# Patient Record
Sex: MALE | Race: WHITE | NOT HISPANIC OR LATINO | Employment: OTHER | URBAN - METROPOLITAN AREA
[De-identification: names, ages, dates, MRNs, and addresses within clinical notes are randomized per-mention and may not be internally consistent; named-entity substitution may affect disease eponyms.]

---

## 2019-10-24 ENCOUNTER — HOSPITAL ENCOUNTER (INPATIENT)
Facility: HOSPITAL | Age: 59
LOS: 1 days | Discharge: HOME | End: 2019-10-25
Attending: EMERGENCY MEDICINE | Admitting: INTERNAL MEDICINE

## 2019-10-24 ENCOUNTER — APPOINTMENT (OUTPATIENT)
Dept: CARDIOLOGY | Facility: HOSPITAL | Age: 59
End: 2019-10-24

## 2019-10-24 ENCOUNTER — APPOINTMENT (OUTPATIENT)
Dept: GENERAL RADIOLOGY | Facility: HOSPITAL | Age: 59
End: 2019-10-24

## 2019-10-24 DIAGNOSIS — I21.4 ACUTE NON-ST ELEVATION MYOCARDIAL INFARCTION (NSTEMI) (HCC): Primary | ICD-10-CM

## 2019-10-24 DIAGNOSIS — Z91.199 MEDICALLY NONCOMPLIANT: ICD-10-CM

## 2019-10-24 DIAGNOSIS — I21.4 NSTEMI (NON-ST ELEVATED MYOCARDIAL INFARCTION) (HCC): ICD-10-CM

## 2019-10-24 LAB
ALBUMIN SERPL-MCNC: 4.3 G/DL (ref 3.5–5.2)
ALBUMIN/GLOB SERPL: 1.2 G/DL
ALP SERPL-CCNC: 96 U/L (ref 39–117)
ALT SERPL W P-5'-P-CCNC: 39 U/L (ref 1–41)
ANION GAP SERPL CALCULATED.3IONS-SCNC: 14.1 MMOL/L (ref 5–15)
AST SERPL-CCNC: 78 U/L (ref 1–40)
BASOPHILS # BLD AUTO: 0.02 10*3/MM3 (ref 0–0.2)
BASOPHILS NFR BLD AUTO: 0.2 % (ref 0–1.5)
BILIRUB SERPL-MCNC: 1.4 MG/DL (ref 0.2–1.2)
BUN BLD-MCNC: 10 MG/DL (ref 6–20)
BUN/CREAT SERPL: 9.9 (ref 7–25)
CALCIUM SPEC-SCNC: 9.1 MG/DL (ref 8.6–10.5)
CHLORIDE SERPL-SCNC: 97 MMOL/L (ref 98–107)
CO2 SERPL-SCNC: 24.9 MMOL/L (ref 22–29)
CREAT BLD-MCNC: 1.01 MG/DL (ref 0.76–1.27)
D DIMER PPP FEU-MCNC: 0.35 MCGFEU/ML (ref 0–0.49)
DEPRECATED RDW RBC AUTO: 42.5 FL (ref 37–54)
EOSINOPHIL # BLD AUTO: 0.02 10*3/MM3 (ref 0–0.4)
EOSINOPHIL NFR BLD AUTO: 0.2 % (ref 0.3–6.2)
ERYTHROCYTE [DISTWIDTH] IN BLOOD BY AUTOMATED COUNT: 13 % (ref 12.3–15.4)
GFR SERPL CREATININE-BSD FRML MDRD: 76 ML/MIN/1.73
GLOBULIN UR ELPH-MCNC: 3.5 GM/DL
GLUCOSE BLD-MCNC: 213 MG/DL (ref 65–99)
HCT VFR BLD AUTO: 50.2 % (ref 37.5–51)
HGB BLD-MCNC: 17.2 G/DL (ref 13–17.7)
HOLD SPECIMEN: NORMAL
HOLD SPECIMEN: NORMAL
IMM GRANULOCYTES # BLD AUTO: 0.04 10*3/MM3 (ref 0–0.05)
IMM GRANULOCYTES NFR BLD AUTO: 0.5 % (ref 0–0.5)
LIPASE SERPL-CCNC: 18 U/L (ref 13–60)
LYMPHOCYTES # BLD AUTO: 1.28 10*3/MM3 (ref 0.7–3.1)
LYMPHOCYTES NFR BLD AUTO: 15.4 % (ref 19.6–45.3)
MCH RBC QN AUTO: 30.3 PG (ref 26.6–33)
MCHC RBC AUTO-ENTMCNC: 34.3 G/DL (ref 31.5–35.7)
MCV RBC AUTO: 88.5 FL (ref 79–97)
MONOCYTES # BLD AUTO: 1.01 10*3/MM3 (ref 0.1–0.9)
MONOCYTES NFR BLD AUTO: 12.1 % (ref 5–12)
NEUTROPHILS # BLD AUTO: 5.96 10*3/MM3 (ref 1.7–7)
NEUTROPHILS NFR BLD AUTO: 71.6 % (ref 42.7–76)
NRBC BLD AUTO-RTO: 0 /100 WBC (ref 0–0.2)
PLATELET # BLD AUTO: 164 10*3/MM3 (ref 140–450)
PMV BLD AUTO: 10.2 FL (ref 6–12)
POTASSIUM BLD-SCNC: 3.9 MMOL/L (ref 3.5–5.2)
PROT SERPL-MCNC: 7.8 G/DL (ref 6–8.5)
RBC # BLD AUTO: 5.67 10*6/MM3 (ref 4.14–5.8)
SODIUM BLD-SCNC: 136 MMOL/L (ref 136–145)
TROPONIN T SERPL-MCNC: 1.29 NG/ML (ref 0–0.03)
WBC NRBC COR # BLD: 8.33 10*3/MM3 (ref 3.4–10.8)
WHOLE BLOOD HOLD SPECIMEN: NORMAL
WHOLE BLOOD HOLD SPECIMEN: NORMAL

## 2019-10-24 PROCEDURE — 93306 TTE W/DOPPLER COMPLETE: CPT

## 2019-10-24 PROCEDURE — 99152 MOD SED SAME PHYS/QHP 5/>YRS: CPT | Performed by: INTERNAL MEDICINE

## 2019-10-24 PROCEDURE — 25010000002 PERFLUTREN (DEFINITY) 8.476 MG IN SODIUM CHLORIDE 0.9 % 10 ML INJECTION: Performed by: INTERNAL MEDICINE

## 2019-10-24 PROCEDURE — 0 IOPAMIDOL PER 1 ML: Performed by: INTERNAL MEDICINE

## 2019-10-24 PROCEDURE — C1769 GUIDE WIRE: HCPCS | Performed by: INTERNAL MEDICINE

## 2019-10-24 PROCEDURE — 93010 ELECTROCARDIOGRAM REPORT: CPT | Performed by: INTERNAL MEDICINE

## 2019-10-24 PROCEDURE — 93458 L HRT ARTERY/VENTRICLE ANGIO: CPT | Performed by: INTERNAL MEDICINE

## 2019-10-24 PROCEDURE — 4A023N7 MEASUREMENT OF CARDIAC SAMPLING AND PRESSURE, LEFT HEART, PERCUTANEOUS APPROACH: ICD-10-PCS | Performed by: INTERNAL MEDICINE

## 2019-10-24 PROCEDURE — 99284 EMERGENCY DEPT VISIT MOD MDM: CPT

## 2019-10-24 PROCEDURE — 93005 ELECTROCARDIOGRAM TRACING: CPT

## 2019-10-24 PROCEDURE — 25010000002 MIDAZOLAM PER 1 MG: Performed by: INTERNAL MEDICINE

## 2019-10-24 PROCEDURE — 84484 ASSAY OF TROPONIN QUANT: CPT | Performed by: EMERGENCY MEDICINE

## 2019-10-24 PROCEDURE — 71046 X-RAY EXAM CHEST 2 VIEWS: CPT

## 2019-10-24 PROCEDURE — G0378 HOSPITAL OBSERVATION PER HR: HCPCS

## 2019-10-24 PROCEDURE — B2151ZZ FLUOROSCOPY OF LEFT HEART USING LOW OSMOLAR CONTRAST: ICD-10-PCS | Performed by: INTERNAL MEDICINE

## 2019-10-24 PROCEDURE — 80053 COMPREHEN METABOLIC PANEL: CPT | Performed by: EMERGENCY MEDICINE

## 2019-10-24 PROCEDURE — 83690 ASSAY OF LIPASE: CPT | Performed by: EMERGENCY MEDICINE

## 2019-10-24 PROCEDURE — 99153 MOD SED SAME PHYS/QHP EA: CPT | Performed by: INTERNAL MEDICINE

## 2019-10-24 PROCEDURE — 25010000002 HEPARIN (PORCINE) PER 1000 UNITS: Performed by: INTERNAL MEDICINE

## 2019-10-24 PROCEDURE — 93306 TTE W/DOPPLER COMPLETE: CPT | Performed by: INTERNAL MEDICINE

## 2019-10-24 PROCEDURE — B2111ZZ FLUOROSCOPY OF MULTIPLE CORONARY ARTERIES USING LOW OSMOLAR CONTRAST: ICD-10-PCS | Performed by: INTERNAL MEDICINE

## 2019-10-24 PROCEDURE — 85025 COMPLETE CBC W/AUTO DIFF WBC: CPT | Performed by: EMERGENCY MEDICINE

## 2019-10-24 PROCEDURE — 93005 ELECTROCARDIOGRAM TRACING: CPT | Performed by: EMERGENCY MEDICINE

## 2019-10-24 PROCEDURE — 99223 1ST HOSP IP/OBS HIGH 75: CPT | Performed by: INTERNAL MEDICINE

## 2019-10-24 PROCEDURE — C1894 INTRO/SHEATH, NON-LASER: HCPCS | Performed by: INTERNAL MEDICINE

## 2019-10-24 PROCEDURE — 85379 FIBRIN DEGRADATION QUANT: CPT | Performed by: EMERGENCY MEDICINE

## 2019-10-24 PROCEDURE — 25010000002 FENTANYL CITRATE (PF) 100 MCG/2ML SOLUTION: Performed by: INTERNAL MEDICINE

## 2019-10-24 RX ORDER — ACETAMINOPHEN 325 MG/1
650 TABLET ORAL EVERY 4 HOURS PRN
Status: DISCONTINUED | OUTPATIENT
Start: 2019-10-24 | End: 2019-10-25 | Stop reason: HOSPADM

## 2019-10-24 RX ORDER — ASPIRIN 81 MG/1
324 TABLET, CHEWABLE ORAL ONCE
Status: COMPLETED | OUTPATIENT
Start: 2019-10-24 | End: 2019-10-24

## 2019-10-24 RX ORDER — MIDAZOLAM HYDROCHLORIDE 1 MG/ML
INJECTION INTRAMUSCULAR; INTRAVENOUS AS NEEDED
Status: DISCONTINUED | OUTPATIENT
Start: 2019-10-24 | End: 2019-10-24 | Stop reason: HOSPADM

## 2019-10-24 RX ORDER — SODIUM CHLORIDE 0.9 % (FLUSH) 0.9 %
10 SYRINGE (ML) INJECTION AS NEEDED
Status: DISCONTINUED | OUTPATIENT
Start: 2019-10-24 | End: 2019-10-25 | Stop reason: HOSPADM

## 2019-10-24 RX ORDER — LISINOPRIL 5 MG/1
5 TABLET ORAL DAILY
Status: DISCONTINUED | OUTPATIENT
Start: 2019-10-24 | End: 2019-10-25 | Stop reason: HOSPADM

## 2019-10-24 RX ORDER — ASPIRIN 81 MG/1
81 TABLET ORAL DAILY
Status: DISCONTINUED | OUTPATIENT
Start: 2019-10-24 | End: 2019-10-25 | Stop reason: HOSPADM

## 2019-10-24 RX ORDER — CLOPIDOGREL BISULFATE 75 MG/1
75 TABLET ORAL DAILY
Status: DISCONTINUED | OUTPATIENT
Start: 2019-10-25 | End: 2019-10-25 | Stop reason: HOSPADM

## 2019-10-24 RX ORDER — LIDOCAINE HYDROCHLORIDE 20 MG/ML
INJECTION, SOLUTION INFILTRATION; PERINEURAL AS NEEDED
Status: DISCONTINUED | OUTPATIENT
Start: 2019-10-24 | End: 2019-10-24 | Stop reason: HOSPADM

## 2019-10-24 RX ORDER — FENTANYL CITRATE 50 UG/ML
INJECTION, SOLUTION INTRAMUSCULAR; INTRAVENOUS AS NEEDED
Status: DISCONTINUED | OUTPATIENT
Start: 2019-10-24 | End: 2019-10-24 | Stop reason: HOSPADM

## 2019-10-24 RX ORDER — ATORVASTATIN CALCIUM 20 MG/1
40 TABLET, FILM COATED ORAL NIGHTLY
Status: DISCONTINUED | OUTPATIENT
Start: 2019-10-24 | End: 2019-10-25 | Stop reason: HOSPADM

## 2019-10-24 RX ORDER — SODIUM CHLORIDE 9 MG/ML
INJECTION, SOLUTION INTRAVENOUS CONTINUOUS PRN
Status: COMPLETED | OUTPATIENT
Start: 2019-10-24 | End: 2019-10-24

## 2019-10-24 RX ORDER — NITROGLYCERIN 0.4 MG/1
0.4 TABLET SUBLINGUAL
Status: DISCONTINUED | OUTPATIENT
Start: 2019-10-24 | End: 2019-10-25 | Stop reason: HOSPADM

## 2019-10-24 RX ADMIN — NITROGLYCERIN 0.4 MG: 0.4 TABLET, ORALLY DISINTEGRATING SUBLINGUAL at 11:38

## 2019-10-24 RX ADMIN — NITROGLYCERIN 1 INCH: 20 OINTMENT TOPICAL at 11:53

## 2019-10-24 RX ADMIN — NITROGLYCERIN 0.4 MG: 0.4 TABLET, ORALLY DISINTEGRATING SUBLINGUAL at 11:32

## 2019-10-24 RX ADMIN — ATORVASTATIN CALCIUM 40 MG: 20 TABLET, FILM COATED ORAL at 20:59

## 2019-10-24 RX ADMIN — LISINOPRIL 5 MG: 5 TABLET ORAL at 21:00

## 2019-10-24 RX ADMIN — ASPIRIN 324 MG: 81 TABLET, CHEWABLE ORAL at 11:31

## 2019-10-24 RX ADMIN — METOPROLOL TARTRATE 25 MG: 25 TABLET ORAL at 17:03

## 2019-10-24 RX ADMIN — ASPIRIN 81 MG: 81 TABLET, COATED ORAL at 17:03

## 2019-10-24 RX ADMIN — PERFLUTREN 2 ML: 6.52 INJECTION, SUSPENSION INTRAVENOUS at 18:30

## 2019-10-24 NOTE — ED PROVIDER NOTES
" EMERGENCY DEPARTMENT ENCOUNTER    CHIEF COMPLAINT  Chief Complaint: Chest pain  History given by: pt  History limited by: Pt is a poor historian  Room Number: 2205/1  PMD: Willard De Leon MD      HPI:  Pt is a 59 y.o. male who presents complaining of indescribable nonexertional constant moderate central chest pain, sometimes radiating into the middle back, that started two days ago while the pt was eating dinner and is unchanged by a deep breath. Pt denies any SOA, nausea, or vomiting. Pt states his pain has gotten progressively better and now states he has \"just a little bit\" of pain. Pt admits to being a social drinker and states he has a family history of cardiac disease but denies hx of cardiac issues or smoking. Pt states he is supposed to be taking diabetic and cholesterol medications but states he is not compliant.     Duration:  Two days  Onset: gradial  Timing: constant  Location: central chest  Radiation: into the middle of the back  Quality: indescribable  Intensity/Severity: moderate  Progression: progressively better  Associated Symptoms: none  Previous Episodes: none    PAST MEDICAL HISTORY  Active Ambulatory Problems     Diagnosis Date Noted   • No Active Ambulatory Problems     Resolved Ambulatory Problems     Diagnosis Date Noted   • No Resolved Ambulatory Problems     Past Medical History:   Diagnosis Date   • Coronary artery disease    • Diabetes mellitus (CMS/HCC)    • Hyperlipidemia        PAST SURGICAL HISTORY  Past Surgical History:   Procedure Laterality Date   • COLONOSCOPY N/A 12/15/2016    Procedure: COLONOSCOPY TO CECUM AND TI WITH POLYPECTOMY COLD SNARE;  Surgeon: Chris Means MD;  Location: [facility] ENDOSCOPY;  Service:    • FRACTURE SURGERY         FAMILY HISTORY  History reviewed. No pertinent family history.    SOCIAL HISTORY  Social History     Socioeconomic History   • Marital status:      Spouse name: Not on file   • Number of children: Not on file   • Years of education: " Not on file   • Highest education level: Not on file   Tobacco Use   • Smoking status: Never Smoker   Substance and Sexual Activity   • Alcohol use: Yes     Comment: socially    • Drug use: No   • Sexual activity: Defer       ALLERGIES  Patient has no known allergies.    REVIEW OF SYSTEMS  Review of Systems   Reason unable to perform ROS: pt is a poor historian.   Respiratory: Negative for shortness of breath.    Cardiovascular: Positive for chest pain.   Gastrointestinal: Negative for nausea and vomiting.   Musculoskeletal: Positive for back pain (middle; due to radiation of chest pain).       PHYSICAL EXAM  ED Triage Vitals [10/24/19 1007]   Temp Heart Rate Resp BP SpO2   96.8 °F (36 °C) 105 16 (!) 158/103 95 %      Temp src Heart Rate Source Patient Position BP Location FiO2 (%)   Tympanic -- -- -- --       Physical Exam   Constitutional: He is oriented to person, place, and time. No distress.   HENT:   Head: Normocephalic and atraumatic.   Eyes: EOM are normal. Pupils are equal, round, and reactive to light.   Neck: Normal range of motion. Neck supple.   Cardiovascular: Normal rate, regular rhythm, normal heart sounds and intact distal pulses.   Pulmonary/Chest: Effort normal and breath sounds normal. No respiratory distress. He has no wheezes. He has no rales. He exhibits no tenderness.   Pain is not reproducible   Abdominal: Soft. There is no tenderness. There is no rebound and no guarding.   Musculoskeletal: Normal range of motion. He exhibits no edema (LE).   Neurological: He is alert and oriented to person, place, and time. He has normal sensation and normal strength.   Skin: Skin is warm and dry.   Psychiatric: Mood and affect normal.   Nursing note and vitals reviewed.      LAB RESULTS  Lab Results (last 24 hours)     Procedure Component Value Units Date/Time    CBC & Differential [[id number]] Collected:  10/24/19 1040    Specimen:  Blood Updated:  10/24/19 1136    Narrative:       The following orders  were created for panel order CBC & Differential.  Procedure                               Abnormality         Status                     ---------                               -----------         ------                     CBC Auto Differential[[id number]]        Abnormal            Final result                 Please view results for these tests on the individual orders.    Comprehensive Metabolic Panel [[id number]]  (Abnormal) Collected:  10/24/19 1040    Specimen:  Blood Updated:  10/24/19 1137     Glucose 213 mg/dL      BUN 10 mg/dL      Creatinine 1.01 mg/dL      Sodium 136 mmol/L      Potassium 3.9 mmol/L      Chloride 97 mmol/L      CO2 24.9 mmol/L      Calcium 9.1 mg/dL      Total Protein 7.8 g/dL      Albumin 4.30 g/dL      ALT (SGPT) 39 U/L      AST (SGOT) 78 U/L      Alkaline Phosphatase 96 U/L      Total Bilirubin 1.4 mg/dL      eGFR Non African Amer 76 mL/min/1.73      Globulin 3.5 gm/dL      A/G Ratio 1.2 g/dL      BUN/Creatinine Ratio 9.9     Anion Gap 14.1 mmol/L     Narrative:       GFR Normal >60  Chronic Kidney Disease <60  Kidney Failure <15    Lipase [[id number]]  (Normal) Collected:  10/24/19 1040    Specimen:  Blood Updated:  10/24/19 1137     Lipase 18 U/L     Troponin [[id number]]  (Abnormal) Collected:  10/24/19 1040    Specimen:  Blood Updated:  10/24/19 1135     Troponin T 1.290 ng/mL     Narrative:       Troponin T Reference Range:  <= 0.03 ng/mL-   Negative for AMI  >0.03 ng/mL-     Abnormal for myocardial necrosis.  Clinicians would have to utilize clinical acumen, EKG, Troponin and serial changes to determine if it is an Acute Myocardial Infarction or myocardial injury due to an underlying chronic condition.     D-dimer, Quantitative [[id number]]  (Normal) Collected:  10/24/19 1040    Specimen:  Blood Updated:  10/24/19 1144     D-Dimer, Quantitative 0.35 MCGFEU/mL     Narrative:       The Stago D-Dimer test used in conjunction with a clinical pretest probability (PTP) assessment model,  has been approved by the FDA to rule out the presence of venous thromboembolism (VTE) in outpatients suspected of deep venous thrombosis (DVT) or pulmonary embolism (PE). The cut-off for negative predictive value is <0.50 MCGFEU/mL.    CBC Auto Differential [[id number]]  (Abnormal) Collected:  10/24/19 1040    Specimen:  Blood Updated:  10/24/19 1136     WBC 8.33 10*3/mm3      RBC 5.67 10*6/mm3      Hemoglobin 17.2 g/dL      Hematocrit 50.2 %      MCV 88.5 fL      MCH 30.3 pg      MCHC 34.3 g/dL      RDW 13.0 %      RDW-SD 42.5 fl      MPV 10.2 fL      Platelets 164 10*3/mm3      Neutrophil % 71.6 %      Lymphocyte % 15.4 %      Monocyte % 12.1 %      Eosinophil % 0.2 %      Basophil % 0.2 %      Immature Grans % 0.5 %      Neutrophils, Absolute 5.96 10*3/mm3      Lymphocytes, Absolute 1.28 10*3/mm3      Monocytes, Absolute 1.01 10*3/mm3      Eosinophils, Absolute 0.02 10*3/mm3      Basophils, Absolute 0.02 10*3/mm3      Immature Grans, Absolute 0.04 10*3/mm3      nRBC 0.0 /100 WBC           I ordered the above labs and reviewed the results    RADIOLOGY  XR Chest 2 View   HISTORY: Chest pain.     FINDINGS:  2 views of the chest demonstrate the heart to be within  normal limits in size. There is no evidence of infiltrate, effusion or  of congestive failure.           I ordered the above noted radiological studies. Interpreted by radiologist.       PROCEDURES  Critical Care  Performed by: Ayden Ervin MD  Authorized by: Ayden Ervin MD     Critical care provider statement:     Critical care time (minutes):  30    Critical care time was exclusive of:  Separately billable procedures and treating other patients    Critical care was necessary to treat or prevent imminent or life-threatening deterioration of the following conditions:  Cardiac failure    Critical care was time spent personally by me on the following activities:  Ordering and performing treatments and interventions, ordering and review of laboratory  studies, ordering and review of radiographic studies, pulse oximetry, development of treatment plan with patient or surrogate, discussions with consultants, examination of patient, obtaining history from patient or surrogate, review of old charts and re-evaluation of patient's condition            EKG          EKG time: 1017  Rhythm/Rate: 90 sinus rhythm  P waves and MO: normal  QRS, axis: IVCD, normal axis,Q waves in leads I, aVL, V5, and V6  ST and T waves: nonspecific ST and T wave changes in multiple leads, subtle ST elevation in aVL    Interpreted Contemporaneously by me, independently viewed  No prior for comparison    REPEAT EKG          EKG time: 1144  Rhythm/Rate: 95 normal lsinus  P waves and MO: normal  QRS, axis: IVCD, Q waves in leads I, aVL, V5, and V6  ST and T waves: mild J point elevation in I and aVL less than a mm, nonspecific ST and T waves changes in multiple leads but most noticeable in inferior leads    Interpreted Contemporaneously by me, independently viewed  Similar compared to prior today        PROGRESS AND CONSULTS       Medications   sodium chloride 0.9 % flush 10 mL ( Intravenous MAR Unhold 10/24/19 1345)   nitroglycerin (NITROSTAT) SL tablet 0.4 mg ( Sublingual MAR Unhold 10/24/19 1345)   acetaminophen (TYLENOL) tablet 650 mg (not administered)   lisinopril (PRINIVIL,ZESTRIL) tablet 5 mg (not administered)   metoprolol tartrate (LOPRESSOR) tablet 25 mg (not administered)   atorvastatin (LIPITOR) tablet 40 mg (not administered)   aspirin EC tablet 81 mg (not administered)   clopidogrel (PLAVIX) tablet 75 mg (not administered)   aspirin chewable tablet 324 mg (324 mg Oral Given 10/24/19 1131)   nitroglycerin (NITROSTAT) ointment 1 inch (1 inch Topical Given 10/24/19 1153)   sodium chloride 0.9 % infusion (400 mL Intravenous New Bag 10/24/19 1314)       1115  Discussed plan to do labs and chest XR. Pt understands and agrees with the plan. All questions have been  answered.    1138  Elevated troponin noted. Ordered repeat EKG, ASA, and NTG and interventional cardiology consult.     1145  Rechecked patient who is resting comfortably and states he now has no pain. BP - 150/96  Discussed all lab and test results, specifically that the pt has suffered an MI. Discussed plan to consult interventional cardiology and do a chest XR. Pt understands and agrees with the plan. All questions have been answered.    1152  Discussed case with Dr Bagley, Interventional Cardiology  Reviewed history, exam, results and treatments.  Discussed concerns and plan of care. Dr Bagley accepts pt to be taken to the Cath lab and recommends not giving heparin. States they will give heparin in the cath lab.        MEDICAL DECISION MAKING  Results were reviewed/discussed with the patient and they were also made aware of online access. Pt also made aware that some labs, such as cultures, will not be resulted during ER visit and follow up with PMD is necessary.     MDM  Number of Diagnoses or Management Options  Acute non-ST elevation myocardial infarction (NSTEMI) (CMS/HCC):   Medically noncompliant:   NSTEMI (non-ST elevated myocardial infarction) (CMS/HCC):      Amount and/or Complexity of Data Reviewed  Clinical lab tests: ordered and reviewed (Troponin 1.290)  Tests in the radiology section of CPT®: ordered and reviewed (Chest XR - NAD)  Tests in the medicine section of CPT®: ordered and reviewed (Refer to the procedure section of the note for EKG results)  Decide to obtain previous medical records or to obtain history from someone other than the patient: yes (Epic)  Review and summarize past medical records: yes (No old records here)  Discuss the patient with other providers: yes (Dr Bagley)    Critical Care  Total time providing critical care: 30-74 minutes         DIAGNOSIS  Final diagnoses:   Acute non-ST elevation myocardial infarction (NSTEMI) (CMS/HCC)   NSTEMI (non-ST elevated myocardial infarction)  (CMS/[facility])   Medically noncompliant       DISPOSITION  Send to Cath Lab.     Latest Documented Vital Signs:  As of 2:23 PM  BP- 120/89 HR- 94 Temp- 98 °F (36.7 °C) (Oral) O2 sat- 96%    --  Documentation assistance provided by annamarie Hutchinson for Dr Ervin.  Information recorded by the scribe was done at my direction and has been verified and validated by me.         Melody Hutchinson  10/24/19 5897       Ayden Ervin MD  10/24/19 8461

## 2019-10-24 NOTE — H&P
Patient Name: Zane Joshi  :1960  59 y.o.    Date of Admission: 10/24/2019  Date of Consultation:  10/24/19  Encounter Provider: Carmelina Elena RN  Place of Service: [facility]  Referring Provider: No ref. provider found  Patient Care Team:  Willard De Leon MD as PCP - General (Internal Medicine)      Chief complaint: Acute NSTEMI     History of Present Illness:    Mr Joshi is a 59 year old patient with a history of diabetes and hyperlipidemia who presented to the ED with complaints of constant chest pain starting in the center of his chest and radiating into his back.  He reports the pain started a couple days ago during dinner.  He denies any nausea or diaphoresis.  He has a family history of premature heart disease.  He denies smoking history.  He is not compliant with his diabetic and cholesterol medications.      On arrival his troponin was 1.290, EKG showed some ST changes in the lateral leads. EKG was repeated with some inferior changes as well. He was given ASA and NTG Sl and NTG paste with resolution of chest pain.  Due to his elevated troponin and EKG changes he was taken urgently to the cath lab       Past Medical History:   Diagnosis Date   • Diabetes mellitus (CMS/HCC)     borderline    • Hyperlipidemia        Past Surgical History:   Procedure Laterality Date   • COLONOSCOPY N/A 12/15/2016    Procedure: COLONOSCOPY TO CECUM AND TI WITH POLYPECTOMY COLD SNARE;  Surgeon: Chris Means MD;  Location: [facility] ENDOSCOPY;  Service:    • FRACTURE SURGERY           Prior to Admission medications    Medication Sig Start Date End Date Taking? Authorizing Provider   atorvastatin (LIPITOR) 10 MG tablet Take 10 mg by mouth Daily.    ProviderKsenia MD   metFORMIN (GLUCOPHAGE) 500 MG tablet Take 500 mg by mouth 2 (Two) Times a Day With Meals.    ProviderKsenia MD       No Known Allergies    Social History     Socioeconomic History    • Marital status:      Spouse name: Not on file   • Number of children: Not on file   • Years of education: Not on file   • Highest education level: Not on file   Tobacco Use   • Smoking status: Never Smoker   Substance and Sexual Activity   • Alcohol use: Yes     Comment: socially    • Drug use: No   • Sexual activity: Defer       History reviewed. No pertinent family history.    REVIEW OF SYSTEMS:   All systems reviewed.  Pertinent positives identified in HPI.  All other systems are negative.      Objective:     Vitals:    10/24/19 1055 10/24/19 1133 10/24/19 1225 10/24/19 1230   BP: (!) 147/103 150/96 137/88    Pulse: 86 96 88 94   Resp:       Temp:       TempSrc:       SpO2: 98% 97% 95% 96%   Weight:       Height:         Body mass index is 31.62 kg/m².    General Appearance:    Alert, cooperative, in no acute distress   Head:    Normocephalic, without obvious abnormality, atraumatic   Eyes:            Lids and lashes normal, conjunctivae and sclerae normal, no icterus, no pallor, corneas clear, PERRLA   Ears:    Ears appear intact with no abnormalities noted   Throat:   No oral lesions, no thrush, oral mucosa moist   Neck:   No adenopathy, supple, trachea midline, no thyromegaly, no carotid bruit, no JVD   Back:     No kyphosis present, no scoliosis present, no skin lesions, erythema or scars, no tenderness to percussion or palpation, range of motion normal   Lungs:     Clear to auscultation, respirations regular, even and unlabored    Heart:    Regular rhythm and normal rate, normal S1 and S2, no murmur, no gallop, no rub, no click   Chest Wall:    No abnormalities observed   Abdomen:     Normal bowel sounds, no masses, no organomegaly, soft, nontender, nondistended, no guarding, no rebound  tenderness   Extremities:   Moves all extremities well, no edema, no cyanosis, no redness   Pulses:   Pulses palpable and equal bilaterally. Normal radial, carotid, femoral, dorsalis pedis and posterior tibial  pulses bilaterally. Normal abdominal aorta   Skin:  Psychiatric:   No bleeding, bruising or rash    Alert and oriented x 3, normal mood and affect   Lab Review:     Results from last 7 days   Lab Units 10/24/19  1040   SODIUM mmol/L 136   POTASSIUM mmol/L 3.9   CHLORIDE mmol/L 97*   CO2 mmol/L 24.9   BUN mg/dL 10   CREATININE mg/dL 1.01   CALCIUM mg/dL 9.1   BILIRUBIN mg/dL 1.4*   ALK PHOS U/L 96   ALT (SGPT) U/L 39   AST (SGOT) U/L 78*   GLUCOSE mg/dL 213*     Results from last 7 days   Lab Units 10/24/19  1040   TROPONIN T ng/mL 1.290*     Results from last 7 days   Lab Units 10/24/19  1040   WBC 10*3/mm3 8.33   HEMOGLOBIN g/dL 17.2   HEMATOCRIT % 50.2   PLATELETS 10*3/mm3 164                         CXR  HISTORY: Chest pain.     FINDINGS:  2 views of the chest demonstrate the heart to be within  normal limits in size. There is no evidence of infiltrate, effusion or  of congestive failure.    EKG- repeat    EKG on arrival      I personally viewed and interpreted the patient's EKG/Telemetry data.        Assessment and Plan:      NSTEMI: totally occluded RCA with R-R and L-R collaterals. Left system without severe disease.   - Asa, ATorva, Plavix    Ischemic CM: EF on LVgram 40-45%. Echo today. Start GDMT when BP tolerates.     DM: untreated, will get A1c.    Ayesha Bagley MD  [facility]  10/24/19

## 2019-10-24 NOTE — ED NOTES
Pt to ED with c/o midsternal chest pains radiating into middle of back Tuesday night at dinner, started when eating hot wings. Not present at this time. Daughter reports pt slept all day Wednesday following pains over Tuesday night, denies feeling lethargic today. Denies SOA, n/v with onset      Michelle Kim RN  10/24/19 4413

## 2019-10-24 NOTE — ED NOTES
Pt reports midsternal CP since Tuesday. Denies SOA.     Layton, Latrice Paniagua, RN  10/24/19 1007

## 2019-10-25 VITALS
OXYGEN SATURATION: 98 % | DIASTOLIC BLOOD PRESSURE: 60 MMHG | SYSTOLIC BLOOD PRESSURE: 111 MMHG | WEIGHT: 189.6 LBS | BODY MASS INDEX: 31.59 KG/M2 | RESPIRATION RATE: 18 BRPM | HEIGHT: 65 IN | HEART RATE: 76 BPM | TEMPERATURE: 98.2 F

## 2019-10-25 LAB
ANION GAP SERPL CALCULATED.3IONS-SCNC: 9.2 MMOL/L (ref 5–15)
AORTIC DIMENSIONLESS INDEX: 0.6 (DI)
BH CV ECHO MEAS - AO MAX PG (FULL): 2.8 MMHG
BH CV ECHO MEAS - AO MAX PG: 5.5 MMHG
BH CV ECHO MEAS - AO MEAN PG (FULL): 2 MMHG
BH CV ECHO MEAS - AO MEAN PG: 3 MMHG
BH CV ECHO MEAS - AO ROOT AREA (BSA CORRECTED): 1.8
BH CV ECHO MEAS - AO ROOT AREA: 9.6 CM^2
BH CV ECHO MEAS - AO ROOT DIAM: 3.5 CM
BH CV ECHO MEAS - AO V2 MAX: 117 CM/SEC
BH CV ECHO MEAS - AO V2 MEAN: 87.9 CM/SEC
BH CV ECHO MEAS - AO V2 VTI: 22.5 CM
BH CV ECHO MEAS - ASC AORTA: 3 CM
BH CV ECHO MEAS - AVA(I,A): 2.2 CM^2
BH CV ECHO MEAS - AVA(I,D): 2.2 CM^2
BH CV ECHO MEAS - AVA(V,A): 2.4 CM^2
BH CV ECHO MEAS - AVA(V,D): 2.4 CM^2
BH CV ECHO MEAS - BSA(HAYCOCK): 2 M^2
BH CV ECHO MEAS - BSA: 1.9 M^2
BH CV ECHO MEAS - BZI_BMI: 31.6 KILOGRAMS/M^2
BH CV ECHO MEAS - BZI_METRIC_HEIGHT: 165 CM
BH CV ECHO MEAS - BZI_METRIC_WEIGHT: 86 KG
BH CV ECHO MEAS - EDV(CUBED): 97.3 ML
BH CV ECHO MEAS - EDV(MOD-SP2): 93 ML
BH CV ECHO MEAS - EDV(MOD-SP4): 128 ML
BH CV ECHO MEAS - EDV(TEICH): 97.3 ML
BH CV ECHO MEAS - EF(CUBED): 48 %
BH CV ECHO MEAS - EF(MOD-BP): 39 %
BH CV ECHO MEAS - EF(MOD-SP2): 49.5 %
BH CV ECHO MEAS - EF(MOD-SP4): 30.5 %
BH CV ECHO MEAS - EF(TEICH): 40.3 %
BH CV ECHO MEAS - ESV(CUBED): 50.7 ML
BH CV ECHO MEAS - ESV(MOD-SP2): 47 ML
BH CV ECHO MEAS - ESV(MOD-SP4): 89 ML
BH CV ECHO MEAS - ESV(TEICH): 58.1 ML
BH CV ECHO MEAS - FS: 19.6 %
BH CV ECHO MEAS - IVS/LVPW: 0.91
BH CV ECHO MEAS - IVSD: 1 CM
BH CV ECHO MEAS - LAT PEAK E' VEL: 7.4 CM/SEC
BH CV ECHO MEAS - LV DIASTOLIC VOL/BSA (35-75): 66.2 ML/M^2
BH CV ECHO MEAS - LV MASS(C)D: 169.9 GRAMS
BH CV ECHO MEAS - LV MASS(C)DI: 87.9 GRAMS/M^2
BH CV ECHO MEAS - LV MAX PG: 2.6 MMHG
BH CV ECHO MEAS - LV MEAN PG: 1 MMHG
BH CV ECHO MEAS - LV SYSTOLIC VOL/BSA (12-30): 46 ML/M^2
BH CV ECHO MEAS - LV V1 MAX: 81.3 CM/SEC
BH CV ECHO MEAS - LV V1 MEAN: 54 CM/SEC
BH CV ECHO MEAS - LV V1 VTI: 14 CM
BH CV ECHO MEAS - LVIDD: 4.6 CM
BH CV ECHO MEAS - LVIDS: 3.7 CM
BH CV ECHO MEAS - LVLD AP2: 7.9 CM
BH CV ECHO MEAS - LVLD AP4: 7.7 CM
BH CV ECHO MEAS - LVLS AP2: 7.4 CM
BH CV ECHO MEAS - LVLS AP4: 6.8 CM
BH CV ECHO MEAS - LVOT AREA (M): 3.5 CM^2
BH CV ECHO MEAS - LVOT AREA: 3.5 CM^2
BH CV ECHO MEAS - LVOT DIAM: 2.1 CM
BH CV ECHO MEAS - LVPWD: 1.1 CM
BH CV ECHO MEAS - MED PEAK E' VEL: 7.4 CM/SEC
BH CV ECHO MEAS - MV A DUR: 123 SEC
BH CV ECHO MEAS - MV A MAX VEL: 53 CM/SEC
BH CV ECHO MEAS - MV DEC SLOPE: 432.5 CM/SEC^2
BH CV ECHO MEAS - MV DEC TIME: 187 SEC
BH CV ECHO MEAS - MV E MAX VEL: 60.6 CM/SEC
BH CV ECHO MEAS - MV E/A: 1.1
BH CV ECHO MEAS - MV MAX PG: 3 MMHG
BH CV ECHO MEAS - MV MEAN PG: 1 MMHG
BH CV ECHO MEAS - MV P1/2T MAX VEL: 91.2 CM/SEC
BH CV ECHO MEAS - MV P1/2T: 61.8 MSEC
BH CV ECHO MEAS - MV V2 MAX: 87.1 CM/SEC
BH CV ECHO MEAS - MV V2 MEAN: 47.5 CM/SEC
BH CV ECHO MEAS - MV V2 VTI: 19.2 CM
BH CV ECHO MEAS - MVA P1/2T LCG: 2.4 CM^2
BH CV ECHO MEAS - MVA(P1/2T): 3.6 CM^2
BH CV ECHO MEAS - MVA(VTI): 2.5 CM^2
BH CV ECHO MEAS - PA ACC TIME: 0.11 SEC
BH CV ECHO MEAS - PA MAX PG (FULL): 1.8 MMHG
BH CV ECHO MEAS - PA MAX PG: 3.5 MMHG
BH CV ECHO MEAS - PA PR(ACCEL): 28.2 MMHG
BH CV ECHO MEAS - PA V2 MAX: 93.5 CM/SEC
BH CV ECHO MEAS - PVA(V,A): 1.6 CM^2
BH CV ECHO MEAS - PVA(V,D): 1.6 CM^2
BH CV ECHO MEAS - QP/QS: 0.62
BH CV ECHO MEAS - RV MAX PG: 1.7 MMHG
BH CV ECHO MEAS - RV MEAN PG: 1 MMHG
BH CV ECHO MEAS - RV V1 MAX: 65 CM/SEC
BH CV ECHO MEAS - RV V1 MEAN: 42.1 CM/SEC
BH CV ECHO MEAS - RV V1 VTI: 13.2 CM
BH CV ECHO MEAS - RVOT AREA: 2.3 CM^2
BH CV ECHO MEAS - RVOT DIAM: 1.7 CM
BH CV ECHO MEAS - SI(AO): 112 ML/M^2
BH CV ECHO MEAS - SI(CUBED): 24.2 ML/M^2
BH CV ECHO MEAS - SI(LVOT): 25.1 ML/M^2
BH CV ECHO MEAS - SI(MOD-SP2): 23.8 ML/M^2
BH CV ECHO MEAS - SI(MOD-SP4): 20.2 ML/M^2
BH CV ECHO MEAS - SI(TEICH): 20.3 ML/M^2
BH CV ECHO MEAS - SV(AO): 216.5 ML
BH CV ECHO MEAS - SV(CUBED): 46.7 ML
BH CV ECHO MEAS - SV(LVOT): 48.5 ML
BH CV ECHO MEAS - SV(MOD-SP2): 46 ML
BH CV ECHO MEAS - SV(MOD-SP4): 39 ML
BH CV ECHO MEAS - SV(RVOT): 30 ML
BH CV ECHO MEAS - SV(TEICH): 39.2 ML
BH CV ECHO MEAS - TAPSE (>1.6): 2 CM2
BH CV ECHO MEASUREMENTS AVERAGE E/E' RATIO: 8.19
BH CV XLRA - RV BASE: 3.8 CM
BH CV XLRA - TDI S': 12.7 CM/SEC
BUN BLD-MCNC: 15 MG/DL (ref 6–20)
BUN/CREAT SERPL: 13.8 (ref 7–25)
CALCIUM SPEC-SCNC: 8.8 MG/DL (ref 8.6–10.5)
CHLORIDE SERPL-SCNC: 97 MMOL/L (ref 98–107)
CHOLEST SERPL-MCNC: 191 MG/DL (ref 0–200)
CO2 SERPL-SCNC: 25.8 MMOL/L (ref 22–29)
CREAT BLD-MCNC: 1.09 MG/DL (ref 0.76–1.27)
DEPRECATED RDW RBC AUTO: 41.3 FL (ref 37–54)
ERYTHROCYTE [DISTWIDTH] IN BLOOD BY AUTOMATED COUNT: 12.7 % (ref 12.3–15.4)
GFR SERPL CREATININE-BSD FRML MDRD: 69 ML/MIN/1.73
GLUCOSE BLD-MCNC: 193 MG/DL (ref 65–99)
HBA1C MFR BLD: 7.7 % (ref 4.8–5.6)
HCT VFR BLD AUTO: 45.1 % (ref 37.5–51)
HDLC SERPL-MCNC: 34 MG/DL (ref 40–60)
HGB BLD-MCNC: 15.5 G/DL (ref 13–17.7)
LDLC SERPL CALC-MCNC: 131 MG/DL (ref 0–100)
LDLC/HDLC SERPL: 3.85 {RATIO}
LEFT ATRIUM VOLUME INDEX: 14.7 ML/M2
MCH RBC QN AUTO: 30.6 PG (ref 26.6–33)
MCHC RBC AUTO-ENTMCNC: 34.4 G/DL (ref 31.5–35.7)
MCV RBC AUTO: 89 FL (ref 79–97)
PLATELET # BLD AUTO: 145 10*3/MM3 (ref 140–450)
PMV BLD AUTO: 9.8 FL (ref 6–12)
POTASSIUM BLD-SCNC: 3.8 MMOL/L (ref 3.5–5.2)
RBC # BLD AUTO: 5.07 10*6/MM3 (ref 4.14–5.8)
SODIUM BLD-SCNC: 132 MMOL/L (ref 136–145)
TRIGL SERPL-MCNC: 131 MG/DL (ref 0–150)
VLDLC SERPL-MCNC: 26.2 MG/DL (ref 5–40)
WBC NRBC COR # BLD: 7.21 10*3/MM3 (ref 3.4–10.8)

## 2019-10-25 PROCEDURE — 94799 UNLISTED PULMONARY SVC/PX: CPT

## 2019-10-25 PROCEDURE — 93005 ELECTROCARDIOGRAM TRACING: CPT | Performed by: INTERNAL MEDICINE

## 2019-10-25 PROCEDURE — 80061 LIPID PANEL: CPT | Performed by: INTERNAL MEDICINE

## 2019-10-25 PROCEDURE — 99238 HOSP IP/OBS DSCHRG MGMT 30/<: CPT | Performed by: INTERNAL MEDICINE

## 2019-10-25 PROCEDURE — 85027 COMPLETE CBC AUTOMATED: CPT | Performed by: INTERNAL MEDICINE

## 2019-10-25 PROCEDURE — 83036 HEMOGLOBIN GLYCOSYLATED A1C: CPT | Performed by: INTERNAL MEDICINE

## 2019-10-25 PROCEDURE — 80048 BASIC METABOLIC PNL TOTAL CA: CPT | Performed by: INTERNAL MEDICINE

## 2019-10-25 PROCEDURE — 93010 ELECTROCARDIOGRAM REPORT: CPT | Performed by: INTERNAL MEDICINE

## 2019-10-25 RX ORDER — ASPIRIN 81 MG/1
81 TABLET ORAL DAILY
Start: 2019-10-26 | End: 2022-09-13

## 2019-10-25 RX ORDER — CLOPIDOGREL BISULFATE 75 MG/1
75 TABLET ORAL DAILY
Qty: 30 TABLET | Refills: 11 | Status: CANCELLED | OUTPATIENT
Start: 2019-10-25

## 2019-10-25 RX ORDER — CLOPIDOGREL BISULFATE 75 MG/1
75 TABLET ORAL DAILY
Qty: 90 TABLET | Refills: 3 | Status: SHIPPED | OUTPATIENT
Start: 2019-10-26 | End: 2020-11-10 | Stop reason: SDUPTHER

## 2019-10-25 RX ORDER — LISINOPRIL 5 MG/1
5 TABLET ORAL DAILY
Qty: 30 TABLET | Refills: 11 | Status: CANCELLED | OUTPATIENT
Start: 2019-10-25

## 2019-10-25 RX ORDER — LISINOPRIL 5 MG/1
5 TABLET ORAL DAILY
Qty: 90 TABLET | Refills: 3 | Status: SHIPPED | OUTPATIENT
Start: 2019-10-26 | End: 2019-12-02 | Stop reason: SDUPTHER

## 2019-10-25 RX ORDER — ATORVASTATIN CALCIUM 40 MG/1
40 TABLET, FILM COATED ORAL NIGHTLY
Qty: 30 TABLET | Refills: 11 | Status: CANCELLED | OUTPATIENT
Start: 2019-10-25

## 2019-10-25 RX ORDER — ATORVASTATIN CALCIUM 40 MG/1
40 TABLET, FILM COATED ORAL NIGHTLY
Qty: 90 TABLET | Refills: 3 | Status: SHIPPED | OUTPATIENT
Start: 2019-10-25 | End: 2020-11-10 | Stop reason: SDUPTHER

## 2019-10-25 RX ORDER — ASPIRIN 81 MG/1
81 TABLET ORAL DAILY
Qty: 30 TABLET | Refills: 11 | Status: CANCELLED
Start: 2019-10-25

## 2019-10-25 RX ORDER — METOPROLOL SUCCINATE 25 MG/1
25 TABLET, EXTENDED RELEASE ORAL DAILY
Qty: 90 TABLET | Refills: 3 | Status: SHIPPED | OUTPATIENT
Start: 2019-10-25 | End: 2020-11-10 | Stop reason: SDUPTHER

## 2019-10-25 RX ADMIN — CLOPIDOGREL 75 MG: 75 TABLET, FILM COATED ORAL at 09:39

## 2019-10-25 RX ADMIN — METOPROLOL TARTRATE 25 MG: 25 TABLET ORAL at 09:39

## 2019-10-25 RX ADMIN — ASPIRIN 81 MG: 81 TABLET, COATED ORAL at 09:39

## 2019-10-25 RX ADMIN — LISINOPRIL 5 MG: 5 TABLET ORAL at 09:39

## 2019-10-25 NOTE — DISCHARGE SUMMARY
Zane Joshi  [id number]    Date of Admit: 10/24/2019  Date of Discharge:  10/25/2019    Discharge Diagnosis:  Active Hospital Problems    Diagnosis  POA   • **Acute non-ST elevation myocardial infarction (NSTEMI) (CMS/[facility]) [I21.4]  Unknown      Resolved Hospital Problems   No resolved problems to display.       Hospital Course:     Mr Joshi is a 59 year old patient who presented to the ED yesterday with chest pain with radiation into his back. His troponin was found to be elevated and he had some EKG changes and admission and repeat EKG.  He was taken urgently to the cath lab. Cardiac Catheterization showed EF 45% with inferior apical hypokinesis due to totally occluded RCA.  He had right to right and left to right collaterals.  Medical therapy was recommended with GDMT and dual antiplatelet therapy for 1 year for his NSTEMI. This morning he ready for discharge. His renal function function is stable.  He will be discharged on ASA/Plavix/ BB/ statin/ ACE. He will follow up with me in 4 weeks     Procedures Performed  Procedure(s):  Left Heart Cath  FINDINGS:     1. HEMODYNAMICS:  /5, /60.     2. LEFT VENTRICULOGRAPHY: EF 40-45%, inferior hypokinesis, LVEDP 10-12mmHg     3. CORONARY ANGIOGRAPHY:   Left main: Large caliber left main coronary artery which bifurcates to the LAD and circumflex arteries.  The left main is normal.  LAD: Medium caliber circumflex which is mild proximal irregularities but is otherwise normal.  There are number of small caliber diagonals which supply the lateral wall.  The distal LAD supplies left to right collaterals to the right system.  Left circumflex: The circumflex is a small caliber vessel.  Its normal in the proximal segment.  It gives off a medium caliber, branching first OM with mild irregularities in the mid segment.  The mid circumflex has a 50 to 60% stenosis just after the first OM takeoff, and gives off a small caliber second OM.  The ongoing AV groove  circumflex provides collaterals to the right coronary.  RCA: Medium caliber, dominant right coronary artery.  It is occluded in the midsegment.  There are bridging collaterals from the atrial and RV branches which backfill the distal RCA.  There are left to right collaterals which backfills the PDA and PL branches.     SUMMARY: Mild to moderate LV dysfunction with inferior apical hypokinesis related to a totally occluded RCA.  There are right to right and left to right collaterals to the right system.     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  Aggressive risk factor modification.  Dual antiplatelet therapy for 1 year for non-STEMI.          Consults     Date and Time Order Name Status Description    10/24/2019 1137 Interventional Cardiology (on-call MD unless specified) Completed           Discharge Medications     Your medication list      ASK your doctor about these medications      Instructions Last Dose Given Next Dose Due   atorvastatin 10 MG tablet  Commonly known as:  LIPITOR      Take 10 mg by mouth Daily.       metFORMIN 500 MG tablet  Commonly known as:  GLUCOPHAGE      Take 500 mg by mouth 2 (Two) Times a Day With Meals.              Discharge Diet: Healthy Heart    Activity at Discharge: No lifting > 5 pounds for 5 days then as tolerated    Discharge disposition: Home    Condition on Discharge: Stable    Follow-up Appointments  No future appointments.      Test Results Pending at Discharge       Carmelina Elena RN  10/25/19  9:56 AM    Ayesha Bagley MD  [facility]  10/25/19

## 2019-10-26 ENCOUNTER — READMISSION MANAGEMENT (OUTPATIENT)
Dept: CALL CENTER | Facility: HOSPITAL | Age: 59
End: 2019-10-26

## 2019-10-26 NOTE — OUTREACH NOTE
Prep Survey      Responses   Facility patient discharged from?  [place]   Is patient eligible?  Yes   Discharge diagnosis  AMI, Left heart cath   Does the patient have one of the following disease processes/diagnoses(primary or secondary)?  Acute MI (STEMI,NSTEMI)   Does the patient have Home health ordered?  No   Is there a DME ordered?  No   Prep survey completed?  Yes          Sherice Bain RN

## 2019-10-28 ENCOUNTER — READMISSION MANAGEMENT (OUTPATIENT)
Dept: CALL CENTER | Facility: HOSPITAL | Age: 59
End: 2019-10-28

## 2019-10-28 NOTE — OUTREACH NOTE
"AMI Week 1 Survey      Responses   Facility patient discharged from?  [facility]   Does the patient have one of the following disease processes/diagnoses(primary or secondary)?  Acute MI (STEMI,NSTEMI)   Is there a successful TCM telephone encounter documented?  No   Week 1 attempt successful?  Yes   Call start time  1639   Call end time  1650   General alerts for this patient  Pt presented as agitated during call, not forth coming with information  but states he wants us to continue calling.   Discharge diagnosis  AMI, Left heart cath   Meds reviewed with patient/caregiver?  Yes   Is the patient having any side effects they believe may be caused by any medication additions or changes?  No   Does the patient have all prescriptions related to this admission filled (includes statins,anticoagulants,HTN meds,anti-arrhythmia meds)  Yes   Is the patient taking all medications as directed (includes completed medication regime)?  Yes   Does the patient have a primary care provider?   Yes   Does the patient have an appointment with their PCP,cardiologist,or clinic within 7 days of discharge?  Yes   Comments regarding PCP  Pt's pcp is Dr. De Leon.  He states \"i dont know if i have an appointment with him\".  He was encouraged to call and make an appt.   Has the patient kept scheduled appointments due by today?  Yes   Psychosocial issues?  No   Did the patient receive a copy of their discharge instructions?  Yes   Nursing interventions  Reviewed instructions with patient   What is the patient's perception of their health status since discharge?  Improving   Is the patient/caregiver able to teach back signs and symptoms of when to call for help immediately:  Sudden chest discomfort, Sudden discomfort in arms, back, neck or jaw, Shortness of breath at any time, Sudden sweating or clammy skin, Nausea or vomiting, Dizziness or lightheadedness, Irregular or rapid heart rate [Pt states he never had any of these symptoms with previous " heart attack]   Is the pateint /caregiver able to teach back the importance of cardiac rehab?  Yes   Is the patient/caregiver able to teach back lifestyle changes to help prevent MIs  Quit smoking, Regular exercise as approved by provider, Managing diabetes   Is the patient/caregiver able to teach back the hierarchy of who to call/visit for symptoms/problems? PCP, Specialist, Home health nurse, Urgent Care, ED, 911  Yes   Week 1 call completed?  Yes   Wrap up additional comments  Pt presented as agitated during call, not forth coming with information  but states he wants us to continue calling.          Kim Malagon, RN

## 2019-11-06 ENCOUNTER — READMISSION MANAGEMENT (OUTPATIENT)
Dept: CALL CENTER | Facility: HOSPITAL | Age: 59
End: 2019-11-06

## 2019-11-06 NOTE — OUTREACH NOTE
AMI Week 2 Survey      Responses   Facility patient discharged from?  [facility]   Does the patient have one of the following disease processes/diagnoses(primary or secondary)?  Acute MI (STEMI,NSTEMI)   Week 2 attempt successful?  Yes   Call start time  0847   Revoke  Decline to participate   Call end time  0848   Revoked  No further contact(revokes)-requires comment   Graduated/Revoked comments  Pt used one word answers and then quit answering all together.          Lizeth Corey RN

## 2019-12-02 ENCOUNTER — OFFICE VISIT (OUTPATIENT)
Dept: CARDIOLOGY | Facility: CLINIC | Age: 59
End: 2019-12-02

## 2019-12-02 VITALS
SYSTOLIC BLOOD PRESSURE: 142 MMHG | HEART RATE: 60 BPM | WEIGHT: 190 LBS | DIASTOLIC BLOOD PRESSURE: 102 MMHG | BODY MASS INDEX: 31.65 KG/M2 | HEIGHT: 65 IN

## 2019-12-02 DIAGNOSIS — I25.10 CORONARY ARTERY DISEASE INVOLVING NATIVE CORONARY ARTERY OF NATIVE HEART WITHOUT ANGINA PECTORIS: Primary | ICD-10-CM

## 2019-12-02 DIAGNOSIS — I25.5 ISCHEMIC CARDIOMYOPATHY: ICD-10-CM

## 2019-12-02 PROCEDURE — 93000 ELECTROCARDIOGRAM COMPLETE: CPT | Performed by: INTERNAL MEDICINE

## 2019-12-02 PROCEDURE — 99213 OFFICE O/P EST LOW 20 MIN: CPT | Performed by: INTERNAL MEDICINE

## 2019-12-02 RX ORDER — LISINOPRIL 10 MG/1
10 TABLET ORAL DAILY
Qty: 90 TABLET | Refills: 3 | Status: SHIPPED | OUTPATIENT
Start: 2019-12-02 | End: 2021-03-09 | Stop reason: SDUPTHER

## 2019-12-02 NOTE — PROGRESS NOTES
Subjective:     Encounter Date:12/02/2019      Patient ID: Zane Joshi is a 59 y.o. male.    Chief Complaint:  HPI:      Mr Joshi is a 59 year old patient who presented to the ED with chest pain with radiation into his back. His troponin was found to be elevated and he had mild lateral ST elevations on his EKG.   He was taken urgently to the cath lab. Cardiac Catheterization showed EF 45% with inferior apical hypokinesis due to totally occluded RCA.  He had right to right and left to right collaterals.This was treated medically. Follow up echo showed an EF of 40%.   Medical therapy was recommended with GDMT and dual antiplatelet therapy for 1 year for his NSTEMI.   He has never smoked. There is no family history of premature CAD.   Today he states he has no problems. He is pretty reticent and doesn't answer questions fully. He is tolerating medications well. His BP is between 140-150 at home.     The following portions of the patient's history were reviewed and updated as appropriate: allergies, current medications, past family history, past medical history, past social history, past surgical history and problem list.     REVIEW OF SYSTEMS:   All systems reviewed.  Pertinent positives identified in HPI.  All other systems are negative.    Past Medical History:   Diagnosis Date   • Chest pain    • Coronary artery disease    • Diabetes mellitus (CMS/HCC)     borderline    • Hyperlipidemia    • NSTEMI (non-ST elevated myocardial infarction) (CMS/HCC)        No family history on file.    Social History     Socioeconomic History   • Marital status:      Spouse name: Not on file   • Number of children: Not on file   • Years of education: Not on file   • Highest education level: Not on file   Tobacco Use   • Smoking status: Never Smoker   • Smokeless tobacco: Never Used   Substance and Sexual Activity   • Alcohol use: Yes     Comment: socially    • Drug use: No   • Sexual activity: Defer       No  Known Allergies    Past Surgical History:   Procedure Laterality Date   • CARDIAC CATHETERIZATION N/A 10/24/2019    Procedure: Left Heart Cath;  Surgeon: Ayesha Bagley MD;  Location:  [facility];  Service: Cardiology   • CARDIAC CATHETERIZATION N/A 10/24/2019    Procedure: Coronary angiography;  Surgeon: Ayesha Bagley MD;  Location:  [facility];  Service: Cardiology   • CARDIAC CATHETERIZATION N/A 10/24/2019    Procedure: Left ventriculography;  Surgeon: Ayesha Bagley MD;  Location:  [facility];  Service: Cardiology   • COLONOSCOPY N/A 12/15/2016    Procedure: COLONOSCOPY TO CECUM AND TI WITH POLYPECTOMY COLD SNARE;  Surgeon: Chris Means MD;  [facility];  Service:    • FRACTURE SURGERY           ECG 12 Lead  Date/Time: 12/2/2019 1:22 PM  Performed by: Ayesha Bagley MD  Authorized by: Ayesha Bagley MD   Comparison: compared with previous ECG from 10/25/2019  Comparison to previous ECG: Lateral MARYLOU  Rhythm: sinus rhythm  Rate: normal  Conduction: non-specific intraventricular conduction delay  T inversion: I and aVL  T flattening: V5 and V6  QRS axis: normal    Clinical impression: abnormal EKG               Objective:         PHYSICAL EXAM:  GEN: VSS, no distress,   Eyes: normal sclera, normal lids and lashes  HENT: moist mucus membranes,   Respiratory: CTAB, no rales or wheezes  CV: RRR, no murmurs, , +2 DP and 2+ carotid pulses b/l  GI: NABS, soft,  Nontender, nondistended  MSK: no edema, no scoliosis or kyphosis  Skin: no rash, warm, dry  Heme/Lymph: no bruising or bleeding  Psych: organized thought, normal behavior and affect  Neuro: Cranial nerves grossly intact, Alert and Oriented x 3.     Outpatient Encounter Medications as of 12/2/2019   Medication Sig Dispense Refill   • aspirin 81 MG EC tablet Take 1 tablet by mouth Daily.     • atorvastatin (LIPITOR) 40 MG tablet Take 1 tablet by mouth Every Night. 90 tablet 3   • Blood Glucose  Monitoring Suppl (FREESTYLE FREEDOM LITE) w/Device kit Use as instructed 1 each 0   • clopidogrel (PLAVIX) 75 MG tablet Take 1 tablet by mouth Daily. 90 tablet 3   • glucose blood test strip Use as instructed to test blood sugar daily as needed 100 each 0   • Lancets (FREESTYLE) lancets Use as instructed to check Blood Sugar daily as needed 100 each 0   • lisinopril (PRINIVIL,ZESTRIL) 5 MG tablet Take 1 tablet by mouth Daily. 90 tablet 3   • metFORMIN ER (GLUCOPHAGE-XR) 500 MG 24 hr tablet Take 1 tablet(s) every day by oral route for 90 days. 90 tablet 1   • metoprolol succinate XL (TOPROL-XL) 25 MG 24 hr tablet Take 1 tablet by mouth Daily. 90 tablet 3   • [DISCONTINUED] metFORMIN (GLUCOPHAGE) 500 MG tablet Take 500 mg by mouth 2 (Two) Times a Day With Meals.       No facility-administered encounter medications on file as of 12/2/2019.              Assessment:          Diagnosis Plan   1. Coronary artery disease involving native coronary artery of native heart without angina pectoris     2. Ischemic cardiomyopathy            Plan:       1. CAD:  of the RCA with collateral formation. There was a moderate circumflex lesion as well. Treat  Medically with Toprol 25, ASA, Atorva 40.   2. Ischemic cardiomyopathy: EF 40%. Toprol 25. Increase Lisinopril to 10mg.   3. HTN: Meds as above.   4. Obesity: Needs to exercise. Currently does not do any formal activity.   5. DM    Dr. De Leon, thank you very much for referring this kind patient to me. Please call me with any questions or concerns. I will see the patient again in the office in 2 months.        Ayesha Bagley MD  12/02/19  [facility]

## 2020-02-05 ENCOUNTER — OFFICE VISIT (OUTPATIENT)
Dept: CARDIOLOGY | Facility: CLINIC | Age: 60
End: 2020-02-05

## 2020-02-05 VITALS
HEART RATE: 63 BPM | HEIGHT: 65 IN | BODY MASS INDEX: 31.82 KG/M2 | WEIGHT: 191 LBS | SYSTOLIC BLOOD PRESSURE: 120 MMHG | DIASTOLIC BLOOD PRESSURE: 80 MMHG

## 2020-02-05 DIAGNOSIS — I25.10 CORONARY ARTERY DISEASE INVOLVING NATIVE CORONARY ARTERY OF NATIVE HEART WITHOUT ANGINA PECTORIS: Primary | ICD-10-CM

## 2020-02-05 PROCEDURE — 93000 ELECTROCARDIOGRAM COMPLETE: CPT | Performed by: INTERNAL MEDICINE

## 2020-02-05 PROCEDURE — 99214 OFFICE O/P EST MOD 30 MIN: CPT | Performed by: INTERNAL MEDICINE

## 2020-02-05 NOTE — PROGRESS NOTES
Subjective:     Encounter Date: 02/05/20      Patient ID: Zane Joshi is a 59 y.o. male.    Chief Complaint:  HPI:      Mr Joshi is a 59 year old patient who presented to the ED with chest pain with radiation into his back. His troponin was found to be elevated and he had mild lateral ST elevations on his EKG.   He was taken urgently to the cath lab. Cardiac Catheterization showed EF 45% with inferior apical hypokinesis due to totally occluded RCA.  He had right to right and left to right collaterals.This was treated medically. Follow up echo showed an EF of 40%.   Medical therapy was recommended with GDMT and dual antiplatelet therapy for 1 year for his NSTEMI.   He has never smoked. There is no family history of premature CAD.     Today he is quite unpleasant. He is not interested in the conversation and stayed on the phone throughout the interview. He says he has no problems. He will not elaborate on any questions regarding symptoms. He doesn't exercise and does not check his BP.     The following portions of the patient's history were reviewed and updated as appropriate: allergies, current medications, past family history, past medical history, past social history, past surgical history and problem list.     REVIEW OF SYSTEMS:   All systems reviewed.  Pertinent positives identified in HPI.  All other systems are negative.    Past Medical History:   Diagnosis Date   • Chest pain    • Coronary artery disease    • Diabetes mellitus (CMS/HCC)     borderline    • Hyperlipidemia    • NSTEMI (non-ST elevated myocardial infarction) (CMS/HCC)        No family history on file.    Social History     Socioeconomic History   • Marital status:      Spouse name: Not on file   • Number of children: Not on file   • Years of education: Not on file   • Highest education level: Not on file   Tobacco Use   • Smoking status: Never Smoker   • Smokeless tobacco: Never Used   Substance and Sexual Activity   •  Alcohol use: Yes     Comment: socially    • Drug use: No   • Sexual activity: Defer       No Known Allergies    Past Surgical History:   Procedure Laterality Date   • CARDIAC CATHETERIZATION N/A 10/24/2019    Procedure: Left Heart Cath;  Surgeon: Ayesha Bagley MD;  Location:  [facility];  Service: Cardiology   • CARDIAC CATHETERIZATION N/A 10/24/2019    Procedure: Coronary angiography;  Surgeon: Ayesha Bagley MD;  Location:  [facility];  Service: Cardiology   • CARDIAC CATHETERIZATION N/A 10/24/2019    Procedure: Left ventriculography;  Surgeon: Ayesha Bagley MD;  Location:  [facility];  Service: Cardiology   • COLONOSCOPY N/A 12/15/2016    Procedure: COLONOSCOPY TO CECUM AND TI WITH POLYPECTOMY COLD SNARE;  Surgeon: Chris Means MD;  [facility];  Service:    • FRACTURE SURGERY           ECG 12 Lead  Date/Time: 2/5/2020 2:12 PM  Performed by: Ayesha Bagley MD  Authorized by: Ayesha Bagley MD   Comparison: compared with previous ECG from 12/2/2019  Similar to previous ECG  Rhythm: sinus rhythm  Rate: normal  Conduction: right bundle branch block  ST Segments: ST segments normal  T Waves: T waves normal  QRS axis: normal  Other: no other findings    Clinical impression: normal ECG               Objective:         PHYSICAL EXAM:  GEN: VSS, no distress,   Eyes: normal sclera, normal lids and lashes  HENT: moist mucus membranes,   Respiratory: CTAB, no rales or wheezes  CV: RRR, no murmurs, , +2 DP and 2+ carotid pulses b/l  GI: NABS, soft,  Nontender, nondistended  MSK: no edema, no scoliosis or kyphosis  Skin: no rash, warm, dry  Heme/Lymph: no bruising or bleeding  Psych: organized thought, normal behavior and affect  Neuro: Cranial nerves grossly intact, Alert and Oriented x 3.     Outpatient Encounter Medications as of 2/5/2020   Medication Sig Dispense Refill   • aspirin 81 MG EC tablet Take 1 tablet by mouth Daily.     • atorvastatin  (LIPITOR) 40 MG tablet Take 1 tablet by mouth Every Night. 90 tablet 3   • Blood Glucose Monitoring Suppl (FREESTYLE FREEDOM LITE) w/Device kit Use as instructed 1 each 0   • clopidogrel (PLAVIX) 75 MG tablet Take 1 tablet by mouth Daily. 90 tablet 3   • glucose blood test strip Use as instructed to test blood sugar daily as needed 100 each 0   • Lancets (FREESTYLE) lancets Use as instructed to check Blood Sugar daily as needed 100 each 0   • lisinopril (PRINIVIL,ZESTRIL) 10 MG tablet Take 1 tablet by mouth Daily. 90 tablet 3   • metFORMIN ER (GLUCOPHAGE-XR) 500 MG 24 hr tablet Take 1 tablet(s) every day by oral route for 90 days. 90 tablet 1   • metoprolol succinate XL (TOPROL-XL) 25 MG 24 hr tablet Take 1 tablet by mouth Daily. 90 tablet 3     No facility-administered encounter medications on file as of 2/5/2020.              Assessment:          Diagnosis Plan   1. Coronary artery disease involving native coronary artery of native heart without angina pectoris            Plan:       1. CAD:  of the RCA with collateral formation. There was a moderate circumflex lesion as well. Treat  Medically with Toprol 25, ASA, Atorva 40.   2. Ischemic cardiomyopathy: EF 40%. Toprol 25. Increase Lisinopril to 10mg.   3. HTN: Meds as above.   4. Obesity: Needs to exercise.    5. DM    Dr. De Leon, thank you very much for referring this kind patient to me. Please call me with any questions or concerns. I will see the patient again in the office in 6 months. I will not make any changes today.          Ayesha Bagley MD  02/05/20  [facility]

## 2020-10-20 ENCOUNTER — TELEPHONE (OUTPATIENT)
Dept: CARDIOLOGY | Facility: CLINIC | Age: 60
End: 2020-10-20

## 2020-10-20 NOTE — TELEPHONE ENCOUNTER
10/20/20  Joanna, pt's wife, who is on ANKITA, called.  Asking which medications it is now ok for pt to stop taking toward the end of this year - wonders if it aspirin and lipitor.  I did not see that was mentioned in his last ofc note.    His next appt is in Feb - 2/12/21 at 11:15.  Her ph [phone]/tia

## 2020-10-20 NOTE — TELEPHONE ENCOUNTER
10/20/20  I spoke with Joanna, pt's wife - I gave her this response - she states he won't come in for an appt now if he has to wear a mask - I suggested possibly using a face shield and she did not think he would do that either.  I instructed for him to continue same meds till he is seen/tia

## 2020-11-10 RX ORDER — CLOPIDOGREL BISULFATE 75 MG/1
75 TABLET ORAL DAILY
Qty: 90 TABLET | Refills: 3 | Status: SHIPPED | OUTPATIENT
Start: 2020-11-10 | End: 2022-02-24

## 2020-11-10 RX ORDER — METOPROLOL SUCCINATE 25 MG/1
25 TABLET, EXTENDED RELEASE ORAL DAILY
Qty: 90 TABLET | Refills: 3 | Status: SHIPPED | OUTPATIENT
Start: 2020-11-10 | End: 2022-02-23 | Stop reason: SDUPTHER

## 2020-11-10 RX ORDER — ATORVASTATIN CALCIUM 40 MG/1
40 TABLET, FILM COATED ORAL NIGHTLY
Qty: 90 TABLET | Refills: 3 | Status: SHIPPED | OUTPATIENT
Start: 2020-11-10 | End: 2022-07-07

## 2021-03-09 RX ORDER — LISINOPRIL 10 MG/1
10 TABLET ORAL DAILY
Qty: 90 TABLET | Refills: 3 | Status: SHIPPED | OUTPATIENT
Start: 2021-03-09 | End: 2022-07-07

## 2022-02-24 RX ORDER — CLOPIDOGREL BISULFATE 75 MG/1
75 TABLET ORAL DAILY
Qty: 30 TABLET | Refills: 0 | Status: CANCELLED | OUTPATIENT
Start: 2022-02-24

## 2022-02-24 RX ORDER — METOPROLOL SUCCINATE 25 MG/1
25 TABLET, EXTENDED RELEASE ORAL DAILY
Qty: 30 TABLET | Refills: 0 | Status: SHIPPED | OUTPATIENT
Start: 2022-02-24 | End: 2022-07-07

## 2022-03-22 RX ORDER — CLOPIDOGREL BISULFATE 75 MG/1
75 TABLET ORAL DAILY
Qty: 90 TABLET | Refills: 3 | Status: CANCELLED | OUTPATIENT
Start: 2022-03-22

## 2022-03-25 RX ORDER — CLOPIDOGREL BISULFATE 75 MG/1
75 TABLET ORAL DAILY
Qty: 90 TABLET | Refills: 3 | OUTPATIENT
Start: 2022-03-25

## 2022-06-30 ENCOUNTER — OFFICE VISIT (OUTPATIENT)
Dept: SURGERY | Facility: CLINIC | Age: 62
End: 2022-06-30

## 2022-06-30 VITALS — BODY MASS INDEX: 30.99 KG/M2 | HEIGHT: 65 IN | WEIGHT: 186 LBS

## 2022-06-30 DIAGNOSIS — K40.90 RIGHT INGUINAL HERNIA: Primary | ICD-10-CM

## 2022-06-30 DIAGNOSIS — K42.9 UMBILICAL HERNIA WITHOUT OBSTRUCTION AND WITHOUT GANGRENE: ICD-10-CM

## 2022-06-30 PROCEDURE — 99204 OFFICE O/P NEW MOD 45 MIN: CPT | Performed by: PHYSICIAN ASSISTANT

## 2022-06-30 RX ORDER — LORATADINE 10 MG/1
10 CAPSULE, LIQUID FILLED ORAL DAILY PRN
COMMUNITY

## 2022-06-30 RX ORDER — CEFAZOLIN SODIUM 2 G/100ML
2 INJECTION, SOLUTION INTRAVENOUS ONCE
Status: CANCELLED | OUTPATIENT
Start: 2022-07-08 | End: 2022-06-30

## 2022-06-30 NOTE — PROGRESS NOTES
"CC:    Inguinal hernia    HPI:    This is a 62-year-old gentleman presenting to the office today as a self-referral.  He states that for 2 weeks now he has noticed pain in his right groin that comes and goes with activity.  The bulge is also starting to become more pronounced.  He states that it is always uncomfortable but comes very painful with activity so much so that he has to lay down to rest and allow it to stop hurting.  He denies nausea, vomiting, abdominal distention, difficulty with urination or difficulty with bowel habits.    PMH:    Reviewed and reconciled in epic; significant for history of NSTEMI, coronary artery disease, GERD, diabetes mellitus    PAST ABDOMINAL SURGERIES:  Colonoscopy 2016    PAST RELEVANT  SURGERIES:  Cardiac catheterization 2019    SH:  Does not smoke, does consume alcohol    FMH:  No colorectal cancers family history    ALLERGIES:   No known drug allergies    MEDICATIONS:  Reviewed and reconciled in Epic.  Of note he does take an 81 mg aspirin    ROS:    All other systems reviewed and negative other than presenting complaints.    PE:  Vitals: Weight 186 height 65\" BMI 30.9  Constitutional: Well-nourished, well-developed male in no acute distress  HEENT: Normocephalic, atraumatic, sclera anicteric, normal conjunctiva  Pulmonary: Clear auscultation bilaterally, normal respiratory effort, no wheeze, rales or noted  Cardiac: Regular rate and rhythm, no rubs, gallops or rubs noted  Abdomen: Small easily reducible umbilical hernia, nontender, nondistended, normal bowel sounds present  : Right inguinal hernia small, easily reducible, no left inguinal hernia noted    CLINICAL SUMMARY (A/P):    This is a 62-year-old gentleman presenting to the office today with a symptomatic right inguinal hernia and a small easily reducible asymptomatic umbilical hernia.  I discussed his options with him to include surgery in the form of an open umbilical hernia repair and laparoscopic right inguinal " hernia repair.  He understands the nature of the procedure and the risks including but not limited to bleeding, infection, use of mesh, and recurrence.  All questions were answered at the time of this visit and they were willing to proceed with all recommendations.      Brayan Francois PA-C

## 2022-07-07 ENCOUNTER — PRE-ADMISSION TESTING (OUTPATIENT)
Dept: PREADMISSION TESTING | Facility: HOSPITAL | Age: 62
End: 2022-07-07

## 2022-07-07 ENCOUNTER — TELEPHONE (OUTPATIENT)
Dept: SURGERY | Facility: CLINIC | Age: 62
End: 2022-07-07

## 2022-07-07 ENCOUNTER — DOCUMENTATION (OUTPATIENT)
Dept: SURGERY | Facility: CLINIC | Age: 62
End: 2022-07-07

## 2022-07-07 VITALS
SYSTOLIC BLOOD PRESSURE: 150 MMHG | TEMPERATURE: 97.8 F | HEIGHT: 64 IN | OXYGEN SATURATION: 96 % | RESPIRATION RATE: 20 BRPM | WEIGHT: 189 LBS | BODY MASS INDEX: 32.27 KG/M2 | HEART RATE: 89 BPM | DIASTOLIC BLOOD PRESSURE: 99 MMHG

## 2022-07-07 DIAGNOSIS — K40.90 RIGHT INGUINAL HERNIA: ICD-10-CM

## 2022-07-07 DIAGNOSIS — K42.9 UMBILICAL HERNIA WITHOUT OBSTRUCTION AND WITHOUT GANGRENE: ICD-10-CM

## 2022-07-07 LAB
ANION GAP SERPL CALCULATED.3IONS-SCNC: 14.2 MMOL/L (ref 5–15)
BUN SERPL-MCNC: 16 MG/DL (ref 8–23)
BUN/CREAT SERPL: 15.5 (ref 7–25)
CALCIUM SPEC-SCNC: 8.7 MG/DL (ref 8.6–10.5)
CHLORIDE SERPL-SCNC: 100 MMOL/L (ref 98–107)
CO2 SERPL-SCNC: 21.8 MMOL/L (ref 22–29)
CREAT SERPL-MCNC: 1.03 MG/DL (ref 0.76–1.27)
DEPRECATED RDW RBC AUTO: 38.4 FL (ref 37–54)
EGFRCR SERPLBLD CKD-EPI 2021: 82.6 ML/MIN/1.73
ERYTHROCYTE [DISTWIDTH] IN BLOOD BY AUTOMATED COUNT: 12.6 % (ref 12.3–15.4)
GLUCOSE SERPL-MCNC: 357 MG/DL (ref 65–99)
HCT VFR BLD AUTO: 44.9 % (ref 37.5–51)
HGB BLD-MCNC: 15.7 G/DL (ref 13–17.7)
MCH RBC QN AUTO: 30.1 PG (ref 26.6–33)
MCHC RBC AUTO-ENTMCNC: 35 G/DL (ref 31.5–35.7)
MCV RBC AUTO: 86.2 FL (ref 79–97)
PLATELET # BLD AUTO: 109 10*3/MM3 (ref 140–450)
PMV BLD AUTO: 10.4 FL (ref 6–12)
POTASSIUM SERPL-SCNC: 4.8 MMOL/L (ref 3.5–5.2)
QT INTERVAL: 378 MS
RBC # BLD AUTO: 5.21 10*6/MM3 (ref 4.14–5.8)
SARS-COV-2 ORF1AB RESP QL NAA+PROBE: NOT DETECTED
SODIUM SERPL-SCNC: 136 MMOL/L (ref 136–145)
WBC NRBC COR # BLD: 3.61 10*3/MM3 (ref 3.4–10.8)

## 2022-07-07 PROCEDURE — 80048 BASIC METABOLIC PNL TOTAL CA: CPT

## 2022-07-07 PROCEDURE — 93010 ELECTROCARDIOGRAM REPORT: CPT | Performed by: INTERNAL MEDICINE

## 2022-07-07 PROCEDURE — U0004 COV-19 TEST NON-CDC HGH THRU: HCPCS

## 2022-07-07 PROCEDURE — C9803 HOPD COVID-19 SPEC COLLECT: HCPCS

## 2022-07-07 PROCEDURE — 93005 ELECTROCARDIOGRAM TRACING: CPT

## 2022-07-07 PROCEDURE — 85027 COMPLETE CBC AUTOMATED: CPT

## 2022-07-07 PROCEDURE — 36415 COLL VENOUS BLD VENIPUNCTURE: CPT

## 2022-07-07 RX ORDER — METFORMIN HYDROCHLORIDE 500 MG/1
500 TABLET, EXTENDED RELEASE ORAL
Qty: 30 TABLET | Refills: 1 | Status: SHIPPED | OUTPATIENT
Start: 2022-07-07 | End: 2022-07-08

## 2022-07-07 RX ORDER — IBUPROFEN 200 MG
200 TABLET ORAL EVERY 6 HOURS PRN
COMMUNITY
End: 2022-08-10

## 2022-07-07 NOTE — TELEPHONE ENCOUNTER
I spoke to the patient and informed him that his surgery will have to be cancelled per Dr. Pressley and that we can reschedule once he sees his primary care doctor and gets his blood sugars under better control. The patient was not happy about it and then hung up on me.

## 2022-07-07 NOTE — PROGRESS NOTES
Spoke with patient regarding preoperative testing showing blood sugar of 357 today.  He was on metformin in the past and had Dr. Ramirez as his primary care physician.  He has not been taking medication for quite some time and no longer has a primary care physician.    I explained to him the risks associated with uncontrolled diabetes and general anesthesia and surgery and why I will not proceed with elective surgery tomorrow.    In order to expedite his treatment, I sent in prescription for metformin  mg p.o. daily as he tolerated this well in the past.  My office will attempt to arrange primary care appointment ASAP for further management of his diabetes and once this is under control we can schedule his surgery.

## 2022-07-07 NOTE — TELEPHONE ENCOUNTER
Shahana SANDERSON called to inform you that Mr. Joshi's glucose was 357 today at his appointment. He also stated that he stopped all medications 6 months ago.

## 2022-07-07 NOTE — DISCHARGE INSTRUCTIONS

## 2022-07-08 ENCOUNTER — OFFICE VISIT (OUTPATIENT)
Dept: INTERNAL MEDICINE | Facility: CLINIC | Age: 62
End: 2022-07-08

## 2022-07-08 ENCOUNTER — TELEPHONE (OUTPATIENT)
Dept: OTHER | Facility: OTHER | Age: 62
End: 2022-07-08

## 2022-07-08 ENCOUNTER — LAB (OUTPATIENT)
Dept: LAB | Facility: HOSPITAL | Age: 62
End: 2022-07-08

## 2022-07-08 VITALS
HEIGHT: 64 IN | RESPIRATION RATE: 16 BRPM | OXYGEN SATURATION: 96 % | SYSTOLIC BLOOD PRESSURE: 128 MMHG | HEART RATE: 73 BPM | TEMPERATURE: 97.9 F | BODY MASS INDEX: 32.78 KG/M2 | DIASTOLIC BLOOD PRESSURE: 64 MMHG | WEIGHT: 192 LBS

## 2022-07-08 DIAGNOSIS — E11.65 TYPE 2 DIABETES MELLITUS WITH HYPERGLYCEMIA, WITHOUT LONG-TERM CURRENT USE OF INSULIN: Primary | ICD-10-CM

## 2022-07-08 LAB — HBA1C MFR BLD: 12.3 % (ref 4.8–5.6)

## 2022-07-08 PROCEDURE — 80053 COMPREHEN METABOLIC PANEL: CPT | Performed by: FAMILY MEDICINE

## 2022-07-08 PROCEDURE — 36415 COLL VENOUS BLD VENIPUNCTURE: CPT | Performed by: FAMILY MEDICINE

## 2022-07-08 PROCEDURE — 83036 HEMOGLOBIN GLYCOSYLATED A1C: CPT | Performed by: FAMILY MEDICINE

## 2022-07-08 PROCEDURE — 99203 OFFICE O/P NEW LOW 30 MIN: CPT | Performed by: FAMILY MEDICINE

## 2022-07-08 RX ORDER — DAPAGLIFLOZIN AND METFORMIN HYDROCHLORIDE 10; 1000 MG/1; MG/1
1 TABLET, FILM COATED, EXTENDED RELEASE ORAL DAILY
Qty: 30 TABLET | Refills: 11 | Status: SHIPPED | OUTPATIENT
Start: 2022-07-08 | End: 2022-07-26 | Stop reason: SDUPTHER

## 2022-07-08 RX ORDER — METFORMIN HYDROCHLORIDE EXTENDED-RELEASE TABLETS 1000 MG/1
1000 TABLET, FILM COATED, EXTENDED RELEASE ORAL
Qty: 30 TABLET | Refills: 2 | Status: SHIPPED | OUTPATIENT
Start: 2022-07-08 | End: 2022-07-15 | Stop reason: SDUPTHER

## 2022-07-08 NOTE — PROGRESS NOTES
"Chief Complaint  Hyperglycemia (Started Metformin today)    Subjective        Zane Joshi presents to [facility]  History of Present Illness    The patient presents today due to concern for having high blood glucose levels. He is accompanied by his wife.    Diabetes  The patient's wife states that the patient was supposed to have hernia surgery today, but his blood sugar was 357 mg/dL. She states that the patient was told that he needed to get into a primary care physician. She states that the patient was started on metformin 500 mg XR today. She states that the patient has been on metformin in the past. She states that the patient's kidney function has been good.    Objective   Vital Signs:  /64 (BP Location: Left arm, Patient Position: Sitting, Cuff Size: Large Adult)   Pulse 73   Temp 97.9 °F (36.6 °C) (Temporal)   Resp 16   Ht 162.6 cm (64.02\")   Wt 87.1 kg (192 lb)   SpO2 96%   BMI 32.94 kg/m²   Estimated body mass index is 32.94 kg/m² as calculated from the following:    Height as of this encounter: 162.6 cm (64.02\").    Weight as of this encounter: 87.1 kg (192 lb).          Physical Exam  Vitals and nursing note reviewed.   Constitutional:       Appearance: He is well-developed.   HENT:      Head: Normocephalic and atraumatic.   Musculoskeletal:      Cervical back: Normal range of motion and neck supple.   Neurological:      Mental Status: He is alert and oriented to person, place, and time.   Psychiatric:         Behavior: Behavior normal.        Result Review :                Assessment and Plan   Diagnoses and all orders for this visit:    1. Type 2 diabetes mellitus with hyperglycemia, without long-term current use of insulin (HCC) (Primary)  -     dapagliflozin-metformin HCl ER (Xigduo XR)  MG tablet; Take 1 tablet by mouth Daily.  Dispense: 30 tablet; Refill: 11  -     metFORMIN (FORTAMET) 1000 MG (OSM) 24 hr tablet; Take 1 tablet by mouth Daily " With Breakfast.  Dispense: 30 tablet; Refill: 2  -     Hemoglobin A1c  -     Comprehensive Metabolic Panel    At today's office visit it is pretty evident that patient is not really happy with the fact of his recent diagnosis and the fact that he did not get his surgery done today.  I did discuss with him significantly and extensively, however is unclear how much he really understood about this, which does appear that his wife most likely understood.  I expressed to him that his diabetes is fairly bad, which at times it seems like it is with the patient was dismissive of this.  I had discussed with him that I will start him on metformin 1000 mg daily, and I also gave him a sample of Xigduo  mg daily for him to take.  This is different than what his surgeon has started him on, and this was the regimen that I was hoping to start him on along with more other agents because I am assuming his hemoglobin A1c will come back high.  It just seemed that at the office visit today he is currently not in a mental state to really discuss where to further tackle these diagnoses and to truly get better, as he states that he only wants to do this to get better in order to have his hernia surgery.  I would like to see the patient back in 2 weeks so I can adjust medication which I will most likely need to do after obtaining labs on him at today's visit.         Follow Up   No follow-ups on file.  Patient was given instructions and counseling regarding his condition or for health maintenance advice. Please see specific information pulled into the AVS if appropriate.     Transcribed from ambient dictation for Mat Singh MD by Evelyn Corley.  07/08/22   17:11 EDT    Patient verbalized consent to the visit recording.  I have personally performed the services described in this document as transcribed by the above individual, and it is both accurate and complete.  Mat Singh MD  7/24/2022  10:10 EDT

## 2022-07-08 NOTE — TELEPHONE ENCOUNTER
Caller: Joanna Hurley    Relationship to patient: Emergency Contact    Best call back number:[phone]     Patient is needing: PATIENT'S WIFE IS CALLING TO ASK IF SHE CAN COME BY TO  PAPERWORK FOR THIS AFTERNOON'S APPOINTMENT.    UNABLE TO WARM TRANSFER    PLEASE ADVISE.

## 2022-07-09 LAB
ALBUMIN SERPL-MCNC: 4.2 G/DL (ref 3.5–5.2)
ALBUMIN/GLOB SERPL: 1.4 G/DL
ALP SERPL-CCNC: 93 U/L (ref 39–117)
ALT SERPL W P-5'-P-CCNC: 19 U/L (ref 1–41)
ANION GAP SERPL CALCULATED.3IONS-SCNC: 14 MMOL/L (ref 5–15)
AST SERPL-CCNC: 12 U/L (ref 1–40)
BILIRUB SERPL-MCNC: 0.4 MG/DL (ref 0–1.2)
BUN SERPL-MCNC: 15 MG/DL (ref 8–23)
BUN/CREAT SERPL: 12.5 (ref 7–25)
CALCIUM SPEC-SCNC: 8.9 MG/DL (ref 8.6–10.5)
CHLORIDE SERPL-SCNC: 97 MMOL/L (ref 98–107)
CO2 SERPL-SCNC: 25 MMOL/L (ref 22–29)
CREAT SERPL-MCNC: 1.2 MG/DL (ref 0.76–1.27)
EGFRCR SERPLBLD CKD-EPI 2021: 68.8 ML/MIN/1.73
GLOBULIN UR ELPH-MCNC: 2.9 GM/DL
GLUCOSE SERPL-MCNC: 413 MG/DL (ref 65–99)
POTASSIUM SERPL-SCNC: 4.4 MMOL/L (ref 3.5–5.2)
PROT SERPL-MCNC: 7.1 G/DL (ref 6–8.5)
SODIUM SERPL-SCNC: 136 MMOL/L (ref 136–145)

## 2022-07-12 ENCOUNTER — TELEPHONE (OUTPATIENT)
Dept: INTERNAL MEDICINE | Facility: CLINIC | Age: 62
End: 2022-07-12

## 2022-07-12 NOTE — TELEPHONE ENCOUNTER
Caller: Joanna Hurley    Relationship to patient: Emergency Contact    Best call back number: [phone]     Patient is needing: PATIENT'S WIFE IS CALLING TO CHECK THE STATUS OF THE FOLLOWING MEDICATION.  SHE STATES THE PHARMACY TOLD HER THEY DID NOT RECEIVE.       metFORMIN (FORTAMET) 1000 MG (OSM) 24 hr tablet     [facility]  [phone]      PATIENT'S WIFE IS ALSO REQUESTING A CALL WITH THE RESULTS OF LABS THAT WERE DONE ON 07/08/22.      PLEASE ADVISE.

## 2022-07-12 NOTE — TELEPHONE ENCOUNTER
Called pt's wife to give lab results per pt's request. She didn't answer. Left message to return call.

## 2022-07-15 ENCOUNTER — TELEPHONE (OUTPATIENT)
Dept: INTERNAL MEDICINE | Facility: CLINIC | Age: 62
End: 2022-07-15

## 2022-07-15 DIAGNOSIS — E11.65 TYPE 2 DIABETES MELLITUS WITH HYPERGLYCEMIA, WITHOUT LONG-TERM CURRENT USE OF INSULIN: ICD-10-CM

## 2022-07-15 RX ORDER — METFORMIN HYDROCHLORIDE 500 MG/1
1000 TABLET, EXTENDED RELEASE ORAL
Qty: 180 TABLET | Refills: 2 | Status: SHIPPED | OUTPATIENT
Start: 2022-07-15 | End: 2022-07-26

## 2022-07-15 NOTE — TELEPHONE ENCOUNTER
Caller: Joanna Hurley    Relationship: Emergency Contact    Best call back number: [phone]    What is the best time to reach you: ANYTIME    Who are you requesting to speak with (clinical staff, provider,  specific staff member): CLINCIAL    What was the call regarding: LAB RESULTS    Do you require a callback: YES

## 2022-07-26 ENCOUNTER — OFFICE VISIT (OUTPATIENT)
Dept: INTERNAL MEDICINE | Facility: CLINIC | Age: 62
End: 2022-07-26

## 2022-07-26 ENCOUNTER — LAB (OUTPATIENT)
Dept: LAB | Facility: HOSPITAL | Age: 62
End: 2022-07-26

## 2022-07-26 VITALS
DIASTOLIC BLOOD PRESSURE: 82 MMHG | SYSTOLIC BLOOD PRESSURE: 126 MMHG | TEMPERATURE: 97.8 F | HEART RATE: 74 BPM | OXYGEN SATURATION: 97 % | WEIGHT: 182.4 LBS | HEIGHT: 64 IN | BODY MASS INDEX: 31.14 KG/M2

## 2022-07-26 DIAGNOSIS — E11.65 TYPE 2 DIABETES MELLITUS WITH HYPERGLYCEMIA, WITHOUT LONG-TERM CURRENT USE OF INSULIN: ICD-10-CM

## 2022-07-26 DIAGNOSIS — N52.9 ERECTILE DYSFUNCTION, UNSPECIFIED ERECTILE DYSFUNCTION TYPE: Primary | ICD-10-CM

## 2022-07-26 LAB
ALBUMIN SERPL-MCNC: 4 G/DL (ref 3.5–5.2)
ALBUMIN UR-MCNC: <1.2 MG/DL
ALBUMIN/GLOB SERPL: 1.3 G/DL
ALP SERPL-CCNC: 86 U/L (ref 39–117)
ALT SERPL W P-5'-P-CCNC: 25 U/L (ref 1–41)
ANION GAP SERPL CALCULATED.3IONS-SCNC: 10.7 MMOL/L (ref 5–15)
AST SERPL-CCNC: 18 U/L (ref 1–40)
BILIRUB SERPL-MCNC: 0.4 MG/DL (ref 0–1.2)
BUN SERPL-MCNC: 18 MG/DL (ref 8–23)
BUN/CREAT SERPL: 16.8 (ref 7–25)
CALCIUM SPEC-SCNC: 9.2 MG/DL (ref 8.6–10.5)
CHLORIDE SERPL-SCNC: 103 MMOL/L (ref 98–107)
CO2 SERPL-SCNC: 23.3 MMOL/L (ref 22–29)
CREAT SERPL-MCNC: 1.07 MG/DL (ref 0.76–1.27)
CREAT UR-MCNC: 82.5 MG/DL
EGFRCR SERPLBLD CKD-EPI 2021: 79 ML/MIN/1.73
GLOBULIN UR ELPH-MCNC: 3 GM/DL
GLUCOSE SERPL-MCNC: 196 MG/DL (ref 65–99)
HBA1C MFR BLD: 10.6 % (ref 4.8–5.6)
MICROALBUMIN/CREAT UR: NORMAL MG/G{CREAT}
POTASSIUM SERPL-SCNC: 4.5 MMOL/L (ref 3.5–5.2)
PROT SERPL-MCNC: 7 G/DL (ref 6–8.5)
SODIUM SERPL-SCNC: 137 MMOL/L (ref 136–145)

## 2022-07-26 PROCEDURE — 99214 OFFICE O/P EST MOD 30 MIN: CPT | Performed by: FAMILY MEDICINE

## 2022-07-26 PROCEDURE — 82570 ASSAY OF URINE CREATININE: CPT | Performed by: FAMILY MEDICINE

## 2022-07-26 PROCEDURE — 83036 HEMOGLOBIN GLYCOSYLATED A1C: CPT | Performed by: FAMILY MEDICINE

## 2022-07-26 PROCEDURE — 80053 COMPREHEN METABOLIC PANEL: CPT | Performed by: FAMILY MEDICINE

## 2022-07-26 PROCEDURE — 82043 UR ALBUMIN QUANTITATIVE: CPT | Performed by: FAMILY MEDICINE

## 2022-07-26 PROCEDURE — 36415 COLL VENOUS BLD VENIPUNCTURE: CPT | Performed by: FAMILY MEDICINE

## 2022-07-26 RX ORDER — METFORMIN HYDROCHLORIDE 500 MG/1
1000 TABLET, EXTENDED RELEASE ORAL
Qty: 180 TABLET | Refills: 2 | Status: SHIPPED | OUTPATIENT
Start: 2022-07-26 | End: 2022-08-24 | Stop reason: SDUPTHER

## 2022-07-26 RX ORDER — DAPAGLIFLOZIN AND METFORMIN HYDROCHLORIDE 10; 1000 MG/1; MG/1
1 TABLET, FILM COATED, EXTENDED RELEASE ORAL DAILY
Qty: 30 TABLET | Refills: 11 | Status: SHIPPED | OUTPATIENT
Start: 2022-07-26 | End: 2022-09-13

## 2022-07-26 RX ORDER — TADALAFIL 10 MG/1
10 TABLET ORAL DAILY PRN
Qty: 30 TABLET | Refills: 2 | Status: SHIPPED | OUTPATIENT
Start: 2022-07-26

## 2022-07-26 RX ORDER — ORAL SEMAGLUTIDE 7 MG/1
7 TABLET ORAL DAILY
Qty: 30 TABLET | Refills: 11 | Status: SHIPPED | OUTPATIENT
Start: 2022-07-26 | End: 2022-08-10

## 2022-07-27 ENCOUNTER — TELEPHONE (OUTPATIENT)
Dept: SURGERY | Facility: CLINIC | Age: 62
End: 2022-07-27

## 2022-07-27 ENCOUNTER — TELEPHONE (OUTPATIENT)
Dept: INTERNAL MEDICINE | Facility: CLINIC | Age: 62
End: 2022-07-27

## 2022-07-27 NOTE — TELEPHONE ENCOUNTER
Called and notified patient's wife. She will call patient's PCP and call us back to schedule if he feels patient is okay to proceed with surgery.

## 2022-07-27 NOTE — PROGRESS NOTES
Please inform the patient of the following abnormal results.  HbA1c has come down to 10.6.  He is to continue to diet and exercise.  Continue his current medications as prescribed.  As well as start the Rybelsus that I gave him in the office. He should be taking xigduo 10-1000mg daily, metformin 1000mg nightly, and rybelsus in the am.

## 2022-07-27 NOTE — TELEPHONE ENCOUNTER
Caller: Joanna Hurley    Relationship: Emergency Contact    Best call back number: [phone]  What is the best time to reach you: ANYTIME    Who are you requesting to speak with (clinical staff, provider,  specific staff member): DR CONCEPCION OR [facility] STAFF  Do you know the name of the person who called: SPOUSE, JOANNA    What was the call regarding: PT AND SPOUSE WANTED TO KNOW WHEN SURGERY WILL BE SCHEDULED, PT HAS BEEN WORKING WITH A NEW PCP TO GET HIS SUGAR LEVELS DOWN, AND WANTED TO KNOW AT WHAT LEVEL WOULD BE GOOD FOR HIM TO PROCEED WITH HERNIA SURGERY.     Do you require a callback: YES

## 2022-07-27 NOTE — TELEPHONE ENCOUNTER
Caller: Joanna Hurley    Relationship: Emergency Contact    Best call back number:[phone]    What is the best time to reach you: ANYTIME    Who are you requesting to speak with (clinical staff, provider,  specific staff member): CLINCIAL    What was the call regarding: LABS OKAY FOR SURGERY     Do you require a callback:YES

## 2022-07-28 NOTE — TELEPHONE ENCOUNTER
His hemoglobin A1c is still elevated at 10.  He needs to discuss with Dr. Pressley about what he needs as far as a hemoglobin A1c in order to get surgery.  He should continue the medications as we discussed and started during the office visit.

## 2022-07-31 NOTE — PROGRESS NOTES
"Chief Complaint  No chief complaint on file.        Subjective        Zane Joshi presents to [facility]  History of Present Illness    Presented today's office visit with a history having type 2 diabetes.  He is currently taking metformin 500 mg at night along with Xigduo  mg in the morning.  He does not check his sugars.  He is really interested in just cured his diabetes in order to get his surgery done for his hernia.  He is very upset about the current situation about having diabetes and taking medicine for this.  As he is more interested in getting his surgery taken care of.    He is also having erectile dysfunction which is started to become a problem for him.  He has used Cialis in the past which seems to have worked well for him.    Objective   Vital Signs:  /82   Pulse 74   Temp 97.8 °F (36.6 °C)   Ht 162.6 cm (64.02\")   Wt 82.7 kg (182 lb 6.4 oz)   SpO2 97%   BMI 31.29 kg/m²   Estimated body mass index is 31.29 kg/m² as calculated from the following:    Height as of this encounter: 162.6 cm (64.02\").    Weight as of this encounter: 82.7 kg (182 lb 6.4 oz).          Physical Exam  Vitals and nursing note reviewed.   Constitutional:       Appearance: He is well-developed.   HENT:      Head: Normocephalic and atraumatic.   Musculoskeletal:      Cervical back: Normal range of motion and neck supple.   Neurological:      Mental Status: He is alert and oriented to person, place, and time.   Psychiatric:         Behavior: Behavior normal.        Result Review :                Assessment and Plan   Diagnoses and all orders for this visit:    1. Erectile dysfunction, unspecified erectile dysfunction type (Primary)  -     tadalafil (Cialis) 10 MG tablet; Take 1 tablet by mouth Daily As Needed for Erectile Dysfunction.  Dispense: 30 tablet; Refill: 2  -     Testosterone,Free+Weakly Bound    2. Type 2 diabetes mellitus with hyperglycemia, without long-term " current use of insulin (HCC)  -     metFORMIN ER (GLUCOPHAGE-XR) 500 MG 24 hr tablet; Take 2 tablets by mouth Daily With Breakfast.  Dispense: 180 tablet; Refill: 2  -     dapagliflozin-metformin HCl ER (Xigduo XR)  MG tablet; Take 1 tablet by mouth Daily.  Dispense: 30 tablet; Refill: 11  -     Comprehensive Metabolic Panel  -     Hemoglobin A1c  -     Microalbumin / Creatinine Urine Ratio - Urine, Clean Catch  -     Semaglutide (Rybelsus) 7 MG tablet; Take 7 mg by mouth Daily.  Dispense: 30 tablet; Refill: 11  -     Hemoglobin A1c; Future  -     Comprehensive Metabolic Panel; Future      I continue to  about lifestyle changes.  He will continue taking Xigduo  mg daily.  I would like his evening dose of to also be metformin 1000 mg daily.  Would also like to start him on Rybelsus 7 mg daily.  We will titrate up from doing 3 mg from today and titrate up to 7 mg over the next month.       Follow Up   No follow-ups on file.  Patient was given instructions and counseling regarding his condition or for health maintenance advice. Please see specific information pulled into the AVS if appropriate.

## 2022-08-10 ENCOUNTER — PRE-ADMISSION TESTING (OUTPATIENT)
Dept: PREADMISSION TESTING | Facility: HOSPITAL | Age: 62
End: 2022-08-10

## 2022-08-10 VITALS
TEMPERATURE: 97.9 F | HEART RATE: 76 BPM | RESPIRATION RATE: 20 BRPM | WEIGHT: 181 LBS | SYSTOLIC BLOOD PRESSURE: 124 MMHG | HEIGHT: 64 IN | BODY MASS INDEX: 30.9 KG/M2 | OXYGEN SATURATION: 98 % | DIASTOLIC BLOOD PRESSURE: 78 MMHG

## 2022-08-10 LAB
ANION GAP SERPL CALCULATED.3IONS-SCNC: 13 MMOL/L (ref 5–15)
BUN SERPL-MCNC: 13 MG/DL (ref 8–23)
BUN/CREAT SERPL: 13.1 (ref 7–25)
CALCIUM SPEC-SCNC: 9.2 MG/DL (ref 8.6–10.5)
CHLORIDE SERPL-SCNC: 103 MMOL/L (ref 98–107)
CO2 SERPL-SCNC: 21 MMOL/L (ref 22–29)
CREAT SERPL-MCNC: 0.99 MG/DL (ref 0.76–1.27)
DEPRECATED RDW RBC AUTO: 43.6 FL (ref 37–54)
EGFRCR SERPLBLD CKD-EPI 2021: 86.7 ML/MIN/1.73
ERYTHROCYTE [DISTWIDTH] IN BLOOD BY AUTOMATED COUNT: 13.3 % (ref 12.3–15.4)
GLUCOSE SERPL-MCNC: 128 MG/DL (ref 65–99)
HCT VFR BLD AUTO: 48.7 % (ref 37.5–51)
HGB BLD-MCNC: 16.4 G/DL (ref 13–17.7)
MCH RBC QN AUTO: 29.9 PG (ref 26.6–33)
MCHC RBC AUTO-ENTMCNC: 33.7 G/DL (ref 31.5–35.7)
MCV RBC AUTO: 88.7 FL (ref 79–97)
PLATELET # BLD AUTO: 147 10*3/MM3 (ref 140–450)
PMV BLD AUTO: 10.2 FL (ref 6–12)
POTASSIUM SERPL-SCNC: 4 MMOL/L (ref 3.5–5.2)
RBC # BLD AUTO: 5.49 10*6/MM3 (ref 4.14–5.8)
SARS-COV-2 ORF1AB RESP QL NAA+PROBE: NOT DETECTED
SODIUM SERPL-SCNC: 137 MMOL/L (ref 136–145)
WBC NRBC COR # BLD: 3.72 10*3/MM3 (ref 3.4–10.8)

## 2022-08-10 PROCEDURE — 36415 COLL VENOUS BLD VENIPUNCTURE: CPT

## 2022-08-10 PROCEDURE — 80048 BASIC METABOLIC PNL TOTAL CA: CPT

## 2022-08-10 PROCEDURE — 85027 COMPLETE CBC AUTOMATED: CPT

## 2022-08-10 PROCEDURE — C9803 HOPD COVID-19 SPEC COLLECT: HCPCS

## 2022-08-10 PROCEDURE — U0004 COV-19 TEST NON-CDC HGH THRU: HCPCS

## 2022-08-10 RX ORDER — CHLORHEXIDINE GLUCONATE 500 MG/1
1 CLOTH TOPICAL
COMMUNITY
End: 2022-08-12 | Stop reason: HOSPADM

## 2022-08-10 NOTE — DISCHARGE INSTRUCTIONS

## 2022-08-12 ENCOUNTER — ANESTHESIA EVENT (OUTPATIENT)
Dept: PERIOP | Facility: HOSPITAL | Age: 62
End: 2022-08-12

## 2022-08-12 ENCOUNTER — ANESTHESIA (OUTPATIENT)
Dept: PERIOP | Facility: HOSPITAL | Age: 62
End: 2022-08-12

## 2022-08-12 ENCOUNTER — HOSPITAL ENCOUNTER (OUTPATIENT)
Facility: HOSPITAL | Age: 62
Setting detail: HOSPITAL OUTPATIENT SURGERY
Discharge: HOME | End: 2022-08-12
Attending: SURGERY | Admitting: SURGERY

## 2022-08-12 VITALS
SYSTOLIC BLOOD PRESSURE: 138 MMHG | RESPIRATION RATE: 16 BRPM | OXYGEN SATURATION: 100 % | HEART RATE: 75 BPM | DIASTOLIC BLOOD PRESSURE: 74 MMHG | TEMPERATURE: 97.6 F

## 2022-08-12 DIAGNOSIS — K42.9 UMBILICAL HERNIA WITHOUT OBSTRUCTION AND WITHOUT GANGRENE: ICD-10-CM

## 2022-08-12 DIAGNOSIS — K40.90 RIGHT INGUINAL HERNIA: ICD-10-CM

## 2022-08-12 LAB — GLUCOSE BLDC GLUCOMTR-MCNC: 140 MG/DL (ref 70–130)

## 2022-08-12 PROCEDURE — 25010000002 PROPOFOL 10 MG/ML EMULSION: Performed by: NURSE ANESTHETIST, CERTIFIED REGISTERED

## 2022-08-12 PROCEDURE — C1781 MESH (IMPLANTABLE): HCPCS | Performed by: SURGERY

## 2022-08-12 PROCEDURE — S0260 H&P FOR SURGERY: HCPCS | Performed by: SURGERY

## 2022-08-12 PROCEDURE — 25010000002 FENTANYL CITRATE (PF) 50 MCG/ML SOLUTION: Performed by: NURSE ANESTHETIST, CERTIFIED REGISTERED

## 2022-08-12 PROCEDURE — 25010000002 MIDAZOLAM PER 1 MG: Performed by: STUDENT IN AN ORGANIZED HEALTH CARE EDUCATION/TRAINING PROGRAM

## 2022-08-12 PROCEDURE — 49650 LAP ING HERNIA REPAIR INIT: CPT | Performed by: SPECIALIST/TECHNOLOGIST, OTHER

## 2022-08-12 PROCEDURE — 25010000002 CEFAZOLIN IN DEXTROSE 2-4 GM/100ML-% SOLUTION: Performed by: PHYSICIAN ASSISTANT

## 2022-08-12 PROCEDURE — 25010000002 HYDROMORPHONE PER 4 MG: Performed by: NURSE ANESTHETIST, CERTIFIED REGISTERED

## 2022-08-12 PROCEDURE — 82962 GLUCOSE BLOOD TEST: CPT

## 2022-08-12 PROCEDURE — 25010000002 ONDANSETRON PER 1 MG: Performed by: NURSE ANESTHETIST, CERTIFIED REGISTERED

## 2022-08-12 PROCEDURE — 25010000002 NEOSTIGMINE 5 MG/10ML SOLUTION: Performed by: NURSE ANESTHETIST, CERTIFIED REGISTERED

## 2022-08-12 PROCEDURE — 25010000002 KETOROLAC TROMETHAMINE PER 15 MG: Performed by: NURSE ANESTHETIST, CERTIFIED REGISTERED

## 2022-08-12 PROCEDURE — 25010000002 DEXAMETHASONE PER 1 MG: Performed by: NURSE ANESTHETIST, CERTIFIED REGISTERED

## 2022-08-12 PROCEDURE — 49650 LAP ING HERNIA REPAIR INIT: CPT | Performed by: SURGERY

## 2022-08-12 DEVICE — FIXATION DEVICE;15 VIOLET ABSORBABLE TACKS
Type: IMPLANTABLE DEVICE | Site: ABDOMEN | Status: FUNCTIONAL
Brand: ABSORBATACK

## 2022-08-12 DEVICE — BARD 3DMAX MESH RIGHT LARGE
Type: IMPLANTABLE DEVICE | Site: ABDOMEN | Status: FUNCTIONAL
Brand: BARD 3DMAX MESH

## 2022-08-12 DEVICE — FLOSEAL HEMOSTATIC MATRIX, 5ML
Type: IMPLANTABLE DEVICE | Site: ABDOMEN | Status: FUNCTIONAL
Brand: FLOSEAL HEMOSTATIC MATRIX

## 2022-08-12 RX ORDER — OXYCODONE AND ACETAMINOPHEN 7.5; 325 MG/1; MG/1
1 TABLET ORAL EVERY 4 HOURS PRN
Status: DISCONTINUED | OUTPATIENT
Start: 2022-08-12 | End: 2022-08-12 | Stop reason: HOSPADM

## 2022-08-12 RX ORDER — NEOSTIGMINE METHYLSULFATE 0.5 MG/ML
INJECTION, SOLUTION INTRAVENOUS AS NEEDED
Status: DISCONTINUED | OUTPATIENT
Start: 2022-08-12 | End: 2022-08-12 | Stop reason: SURG

## 2022-08-12 RX ORDER — EPHEDRINE SULFATE 50 MG/ML
5 INJECTION, SOLUTION INTRAVENOUS ONCE AS NEEDED
Status: DISCONTINUED | OUTPATIENT
Start: 2022-08-12 | End: 2022-08-12 | Stop reason: HOSPADM

## 2022-08-12 RX ORDER — KETOROLAC TROMETHAMINE 30 MG/ML
INJECTION, SOLUTION INTRAMUSCULAR; INTRAVENOUS AS NEEDED
Status: DISCONTINUED | OUTPATIENT
Start: 2022-08-12 | End: 2022-08-12 | Stop reason: SURG

## 2022-08-12 RX ORDER — GLYCOPYRROLATE 0.2 MG/ML
INJECTION INTRAMUSCULAR; INTRAVENOUS AS NEEDED
Status: DISCONTINUED | OUTPATIENT
Start: 2022-08-12 | End: 2022-08-12 | Stop reason: SURG

## 2022-08-12 RX ORDER — ONDANSETRON 2 MG/ML
4 INJECTION INTRAMUSCULAR; INTRAVENOUS ONCE AS NEEDED
Status: DISCONTINUED | OUTPATIENT
Start: 2022-08-12 | End: 2022-08-12 | Stop reason: HOSPADM

## 2022-08-12 RX ORDER — FENTANYL CITRATE 50 UG/ML
50 INJECTION, SOLUTION INTRAMUSCULAR; INTRAVENOUS
Status: DISCONTINUED | OUTPATIENT
Start: 2022-08-12 | End: 2022-08-12 | Stop reason: HOSPADM

## 2022-08-12 RX ORDER — HYDROCODONE BITARTRATE AND ACETAMINOPHEN 7.5; 325 MG/1; MG/1
1 TABLET ORAL ONCE AS NEEDED
Status: DISCONTINUED | OUTPATIENT
Start: 2022-08-12 | End: 2022-08-12 | Stop reason: HOSPADM

## 2022-08-12 RX ORDER — CEFAZOLIN SODIUM 2 G/100ML
2 INJECTION, SOLUTION INTRAVENOUS ONCE
Status: COMPLETED | OUTPATIENT
Start: 2022-08-12 | End: 2022-08-12

## 2022-08-12 RX ORDER — SODIUM CHLORIDE 9 MG/ML
INJECTION, SOLUTION INTRAVENOUS AS NEEDED
Status: DISCONTINUED | OUTPATIENT
Start: 2022-08-12 | End: 2022-08-12 | Stop reason: HOSPADM

## 2022-08-12 RX ORDER — SODIUM CHLORIDE, SODIUM LACTATE, POTASSIUM CHLORIDE, CALCIUM CHLORIDE 600; 310; 30; 20 MG/100ML; MG/100ML; MG/100ML; MG/100ML
9 INJECTION, SOLUTION INTRAVENOUS CONTINUOUS
Status: DISCONTINUED | OUTPATIENT
Start: 2022-08-12 | End: 2022-08-12 | Stop reason: HOSPADM

## 2022-08-12 RX ORDER — HYDROMORPHONE HCL 110MG/55ML
PATIENT CONTROLLED ANALGESIA SYRINGE INTRAVENOUS AS NEEDED
Status: DISCONTINUED | OUTPATIENT
Start: 2022-08-12 | End: 2022-08-12 | Stop reason: SURG

## 2022-08-12 RX ORDER — ROCURONIUM BROMIDE 10 MG/ML
INJECTION, SOLUTION INTRAVENOUS AS NEEDED
Status: DISCONTINUED | OUTPATIENT
Start: 2022-08-12 | End: 2022-08-12 | Stop reason: SURG

## 2022-08-12 RX ORDER — HYDROMORPHONE HYDROCHLORIDE 1 MG/ML
0.5 INJECTION, SOLUTION INTRAMUSCULAR; INTRAVENOUS; SUBCUTANEOUS
Status: DISCONTINUED | OUTPATIENT
Start: 2022-08-12 | End: 2022-08-12 | Stop reason: HOSPADM

## 2022-08-12 RX ORDER — HYDROCODONE BITARTRATE AND ACETAMINOPHEN 5; 325 MG/1; MG/1
TABLET ORAL
Qty: 24 TABLET | Refills: 0 | Status: SHIPPED | OUTPATIENT
Start: 2022-08-12 | End: 2022-09-13

## 2022-08-12 RX ORDER — ONDANSETRON 4 MG/1
4 TABLET, FILM COATED ORAL EVERY 6 HOURS PRN
Qty: 10 TABLET | Refills: 1 | Status: SHIPPED | OUTPATIENT
Start: 2022-08-12 | End: 2022-08-23

## 2022-08-12 RX ORDER — PROMETHAZINE HYDROCHLORIDE 25 MG/1
25 TABLET ORAL ONCE AS NEEDED
Status: DISCONTINUED | OUTPATIENT
Start: 2022-08-12 | End: 2022-08-12 | Stop reason: HOSPADM

## 2022-08-12 RX ORDER — SODIUM CHLORIDE 0.9 % (FLUSH) 0.9 %
3 SYRINGE (ML) INJECTION EVERY 12 HOURS SCHEDULED
Status: DISCONTINUED | OUTPATIENT
Start: 2022-08-12 | End: 2022-08-12 | Stop reason: HOSPADM

## 2022-08-12 RX ORDER — IBUPROFEN 600 MG/1
600 TABLET ORAL ONCE AS NEEDED
Status: DISCONTINUED | OUTPATIENT
Start: 2022-08-12 | End: 2022-08-12 | Stop reason: HOSPADM

## 2022-08-12 RX ORDER — MIDAZOLAM HYDROCHLORIDE 1 MG/ML
1 INJECTION INTRAMUSCULAR; INTRAVENOUS
Status: COMPLETED | OUTPATIENT
Start: 2022-08-12 | End: 2022-08-12

## 2022-08-12 RX ORDER — ONDANSETRON 2 MG/ML
INJECTION INTRAMUSCULAR; INTRAVENOUS AS NEEDED
Status: DISCONTINUED | OUTPATIENT
Start: 2022-08-12 | End: 2022-08-12 | Stop reason: SURG

## 2022-08-12 RX ORDER — BUPIVACAINE HYDROCHLORIDE AND EPINEPHRINE 5; 5 MG/ML; UG/ML
INJECTION, SOLUTION EPIDURAL; INTRACAUDAL; PERINEURAL AS NEEDED
Status: DISCONTINUED | OUTPATIENT
Start: 2022-08-12 | End: 2022-08-12 | Stop reason: HOSPADM

## 2022-08-12 RX ORDER — NALOXONE HCL 0.4 MG/ML
0.2 VIAL (ML) INJECTION AS NEEDED
Status: DISCONTINUED | OUTPATIENT
Start: 2022-08-12 | End: 2022-08-12 | Stop reason: HOSPADM

## 2022-08-12 RX ORDER — HYDRALAZINE HYDROCHLORIDE 20 MG/ML
5 INJECTION INTRAMUSCULAR; INTRAVENOUS
Status: DISCONTINUED | OUTPATIENT
Start: 2022-08-12 | End: 2022-08-12 | Stop reason: HOSPADM

## 2022-08-12 RX ORDER — PROMETHAZINE HYDROCHLORIDE 25 MG/1
25 SUPPOSITORY RECTAL ONCE AS NEEDED
Status: DISCONTINUED | OUTPATIENT
Start: 2022-08-12 | End: 2022-08-12 | Stop reason: HOSPADM

## 2022-08-12 RX ORDER — LIDOCAINE HYDROCHLORIDE 10 MG/ML
0.5 INJECTION, SOLUTION EPIDURAL; INFILTRATION; INTRACAUDAL; PERINEURAL ONCE AS NEEDED
Status: COMPLETED | OUTPATIENT
Start: 2022-08-12 | End: 2022-08-12

## 2022-08-12 RX ORDER — EPHEDRINE SULFATE 50 MG/ML
INJECTION, SOLUTION INTRAVENOUS AS NEEDED
Status: DISCONTINUED | OUTPATIENT
Start: 2022-08-12 | End: 2022-08-12 | Stop reason: SURG

## 2022-08-12 RX ORDER — DEXAMETHASONE SODIUM PHOSPHATE 10 MG/ML
INJECTION INTRAMUSCULAR; INTRAVENOUS AS NEEDED
Status: DISCONTINUED | OUTPATIENT
Start: 2022-08-12 | End: 2022-08-12 | Stop reason: SURG

## 2022-08-12 RX ORDER — LABETALOL HYDROCHLORIDE 5 MG/ML
5 INJECTION, SOLUTION INTRAVENOUS
Status: DISCONTINUED | OUTPATIENT
Start: 2022-08-12 | End: 2022-08-12 | Stop reason: HOSPADM

## 2022-08-12 RX ORDER — PROPOFOL 10 MG/ML
VIAL (ML) INTRAVENOUS AS NEEDED
Status: DISCONTINUED | OUTPATIENT
Start: 2022-08-12 | End: 2022-08-12 | Stop reason: SURG

## 2022-08-12 RX ORDER — DIPHENHYDRAMINE HYDROCHLORIDE 50 MG/ML
12.5 INJECTION INTRAMUSCULAR; INTRAVENOUS
Status: DISCONTINUED | OUTPATIENT
Start: 2022-08-12 | End: 2022-08-12 | Stop reason: HOSPADM

## 2022-08-12 RX ORDER — DIPHENHYDRAMINE HCL 25 MG
25 CAPSULE ORAL
Status: DISCONTINUED | OUTPATIENT
Start: 2022-08-12 | End: 2022-08-12 | Stop reason: HOSPADM

## 2022-08-12 RX ORDER — FENTANYL CITRATE 50 UG/ML
INJECTION, SOLUTION INTRAMUSCULAR; INTRAVENOUS AS NEEDED
Status: DISCONTINUED | OUTPATIENT
Start: 2022-08-12 | End: 2022-08-12 | Stop reason: SURG

## 2022-08-12 RX ORDER — SODIUM CHLORIDE 0.9 % (FLUSH) 0.9 %
3-10 SYRINGE (ML) INJECTION AS NEEDED
Status: DISCONTINUED | OUTPATIENT
Start: 2022-08-12 | End: 2022-08-12 | Stop reason: HOSPADM

## 2022-08-12 RX ORDER — FLUMAZENIL 0.1 MG/ML
0.2 INJECTION INTRAVENOUS AS NEEDED
Status: DISCONTINUED | OUTPATIENT
Start: 2022-08-12 | End: 2022-08-12 | Stop reason: HOSPADM

## 2022-08-12 RX ORDER — LIDOCAINE HYDROCHLORIDE 20 MG/ML
INJECTION, SOLUTION INFILTRATION; PERINEURAL AS NEEDED
Status: DISCONTINUED | OUTPATIENT
Start: 2022-08-12 | End: 2022-08-12 | Stop reason: SURG

## 2022-08-12 RX ADMIN — ROCURONIUM BROMIDE 40 MG: 50 INJECTION INTRAVENOUS at 12:41

## 2022-08-12 RX ADMIN — PROPOFOL 200 MG: 10 INJECTION, EMULSION INTRAVENOUS at 12:41

## 2022-08-12 RX ADMIN — SODIUM CHLORIDE, POTASSIUM CHLORIDE, SODIUM LACTATE AND CALCIUM CHLORIDE 9 ML/HR: 600; 310; 30; 20 INJECTION, SOLUTION INTRAVENOUS at 09:40

## 2022-08-12 RX ADMIN — NEOSTIGMINE METHYLSULFATE 3 MG: 0.5 INJECTION INTRAVENOUS at 13:41

## 2022-08-12 RX ADMIN — ONDANSETRON 4 MG: 2 INJECTION INTRAMUSCULAR; INTRAVENOUS at 13:41

## 2022-08-12 RX ADMIN — CEFAZOLIN SODIUM 2 G: 2 INJECTION, SOLUTION INTRAVENOUS at 12:30

## 2022-08-12 RX ADMIN — GLYCOPYRROLATE 0.2 MG: 0.2 INJECTION INTRAMUSCULAR; INTRAVENOUS at 13:11

## 2022-08-12 RX ADMIN — LIDOCAINE HYDROCHLORIDE 0.5 ML: 10 INJECTION, SOLUTION EPIDURAL; INFILTRATION; INTRACAUDAL; PERINEURAL at 09:35

## 2022-08-12 RX ADMIN — DEXAMETHASONE SODIUM PHOSPHATE 8 MG: 10 INJECTION INTRAMUSCULAR; INTRAVENOUS at 12:47

## 2022-08-12 RX ADMIN — EPHEDRINE SULFATE 10 MG: 50 INJECTION INTRAVENOUS at 13:10

## 2022-08-12 RX ADMIN — GLYCOPYRROLATE 0.6 MG: 0.2 INJECTION INTRAMUSCULAR; INTRAVENOUS at 13:41

## 2022-08-12 RX ADMIN — LIDOCAINE HYDROCHLORIDE 100 MG: 20 INJECTION, SOLUTION INFILTRATION; PERINEURAL at 12:41

## 2022-08-12 RX ADMIN — MIDAZOLAM 1 MG: 1 INJECTION INTRAMUSCULAR; INTRAVENOUS at 09:46

## 2022-08-12 RX ADMIN — FENTANYL CITRATE 50 MCG: 50 INJECTION INTRAMUSCULAR; INTRAVENOUS at 13:47

## 2022-08-12 RX ADMIN — MIDAZOLAM 1 MG: 1 INJECTION INTRAMUSCULAR; INTRAVENOUS at 09:43

## 2022-08-12 RX ADMIN — KETOROLAC TROMETHAMINE 30 MG: 30 INJECTION, SOLUTION INTRAMUSCULAR at 13:41

## 2022-08-12 RX ADMIN — FENTANYL CITRATE 50 MCG: 50 INJECTION INTRAMUSCULAR; INTRAVENOUS at 13:20

## 2022-08-12 RX ADMIN — HYDROMORPHONE HYDROCHLORIDE 1 MG: 2 INJECTION, SOLUTION INTRAMUSCULAR; INTRAVENOUS; SUBCUTANEOUS at 12:38

## 2022-08-12 NOTE — H&P
CC: Hernia    HPI: 62-year-old gentleman with symptomatic right inguinal hernia and umbilical hernia    PMH, PSH, MEDS AND ALLERGIES reviewed and reconciled in  EPIC    PHYSICAL EXAM:  • Constitutional:  awake, alert, no acute distress  • VS: afebrile, VSS  • Respiratory:  normal inspiratory effort  • Cardiovascular: regular rate  • Gastrointestinal: Soft, umbilical hernia, right inguinal hernia    ROS:  relevant systems negative other than any presenting complaints    ASSESSMENT/PLAN:    62-year-old gentleman presents for laparoscopic right inguinal hernia repair with open umbilical hernia repair    Glynn Pressley M.D.

## 2022-08-12 NOTE — ANESTHESIA POSTPROCEDURE EVALUATION
Patient: Zane Joshi    Procedure Summary     Date: 08/12/22 Room / Location:  [facility] [address] [place] [address] [facility]    Anesthesia Start: 1236 Anesthesia Stop: 1356    Procedures:       INGUINAL HERNIA REPAIR LAPAROSCOPIC RIGHT (Right Abdomen)      OPEN UMBILICAL HERNIA REPAIR (N/A Abdomen) Diagnosis:       Right inguinal hernia      Umbilical hernia without obstruction and without gangrene      (Right inguinal hernia [K40.90])      (Umbilical hernia without obstruction and without gangrene [K42.9])    Surgeons: Glynn Pressley MD Provider: Ashok Nevarez MD    Anesthesia Type: general ASA Status: 3          Anesthesia Type: general    Vitals  Vitals Value Taken Time   /70 08/12/22 1430   Temp 36.4 °C (97.6 °F) 08/12/22 1430   Pulse 63 08/12/22 1438   Resp 13 08/12/22 1430   SpO2 99 % 08/12/22 1438   Vitals shown include unvalidated device data.        Post Anesthesia Care and Evaluation    Patient location during evaluation: bedside  Patient participation: complete - patient participated  Level of consciousness: awake and alert  Pain management: adequate    Airway patency: patent  Anesthetic complications: No anesthetic complications  PONV Status: controlled  Cardiovascular status: blood pressure returned to baseline and acceptable  Respiratory status: acceptable  Hydration status: acceptable

## 2022-08-12 NOTE — ANESTHESIA PREPROCEDURE EVALUATION
Anesthesia Evaluation     Patient summary reviewed and Nursing notes reviewed   no history of anesthetic complications:  NPO Solid Status: > 8 hours  NPO Liquid Status: > 2 hours           Airway   Mallampati: II  TM distance: >3 FB  Neck ROM: full  Dental      Pulmonary    Cardiovascular     ECG reviewed    (+) hypertension, CAD, hyperlipidemia,     ROS comment: Echo: EF 39%, Stage II DD    Neuro/Psych  GI/Hepatic/Renal/Endo    (+) obesity,   diabetes mellitus,     Musculoskeletal     Abdominal    Substance History      OB/GYN          Other                        Anesthesia Plan    ASA 3     general     intravenous induction     Anesthetic plan, risks, benefits, and alternatives have been provided, discussed and informed consent has been obtained with: patient.        CODE STATUS:

## 2022-08-12 NOTE — ANESTHESIA PROCEDURE NOTES
Airway  Urgency: elective    Date/Time: 8/12/2022 12:44 PM  Airway not difficult    General Information and Staff    Patient location during procedure: OR  Anesthesiologist: Ashok Nevarez MD  CRNA/CAA: Romie Hidalgo CRNA    Indications and Patient Condition  Indications for airway management: airway protection    Preoxygenated: yes  MILS maintained throughout  Mask difficulty assessment: 2 - vent by mask + OA or adjuvant +/- NMBA    Final Airway Details  Final airway type: endotracheal airway      Successful airway: ETT  Cuffed: yes   Successful intubation technique: direct laryngoscopy  Facilitating devices/methods: intubating stylet  Endotracheal tube insertion site: oral  Blade: Lemos  Blade size: 2  ETT size (mm): 7.5  Cormack-Lehane Classification: grade I - full view of glottis  Placement verified by: chest auscultation and capnometry   Cuff volume (mL): 6  Measured from: lips  ETT/EBT  to lips (cm): 21  Number of attempts at approach: 1  Assessment: lips, teeth, and gum same as pre-op and atraumatic intubation

## 2022-08-12 NOTE — OP NOTE
PREOPERATIVE DIAGNOSIS:  Right inguinal hernia  Umbilical hernia    POSTOPERATIVE DIAGNOSIS (FINDINGS):  Indirect right inguinal hernia  Umbilical hernia    PROCEDURE:  Laparoscopic right inguinal hernia repair with umbilical hernia repair    SURGEON:  Glynn Pressley MD    ASSISTANT:  Monse Iyer, was responsible for performing the following activities: suction, irrigation, suturing, closing, retraction, camera holding, and placing dressing, and their skilled assistance was necessary for the success of this case.    ANESTHESIA:  General    EBL:  Minimal    SPECIMEN(S):  none    DESCRIPTION:  Supine position. General anesthesia. Prepped and draped, usual sterile manner.    1/2 % marcaine with epinephrine infiltrated in all incision sites. Small infra-umbilical incision made and the umbilical skin  from the underlying hernia sac which was reduced to the preperitoneal plane.  I extended the incision in the fascia to expose the right rectus and entered the preperitoneal plane which was developed with the balloon dissector followed by insufflation balloon, 15 mmHg pressure, and 2 midline 5 mm trochars.      Coopers ligament exposed. Peritoneum stripped from vas deferens and spermatic vessels, reducing a moderately large indirect inguinal hernia in the process. With inguinal floor thus cleared, large Bard 3-D mesh placed and tacked to Dale's ligament. This resulted in good flat apposition of the mesh to the inguinal floor and good coverage of all real and potential hernia defects. Good hemostasis noted, CO2 released, fascia of umbilical hernia closed with interrupted 0 Ethibond suture.  Umbilicus tacked to the fascia with 3-0 Vicryl.  Skin edges 5-0 Vicryl subcuticular followed by Exofin.  Tolerated well.    Tolerated well, stable to PACU.    Glynn Pressley M.D.

## 2022-08-12 NOTE — DISCHARGE INSTRUCTIONS
Dr. Glynn Pressley  [address]  [place] [id number] [phone]    Discharge Instructions for Hernia Surgery    Go home, rest and take it easy today; however, you should get up and move about several times today to reduce the risk of developing a clot in your legs.      You may experience some dizziness or memory loss from the anesthesia.  This may last for the next 24 hours.  Someone should plan on staying with you for the first 24 hours for your safety.    Do not make any important legal decisions or sign any legal papers for the next 24 hours.      Eat and drink lightly today.  Start off with liquids, jello, soup, crackers or other bland foods at first. You may advance your diet tomorrow as tolerated as long as you do not experience any nausea or vomiting.     If skin glue (Dermabond) was used, your incisions are protected and covered.  The invisible glue will dissolve on its own as your incision heals. If dressings were used, you may remove your outer dressings in 3 days.  The white tapes called steri-strips should stay in place.  They will fall off on their own in 1-2 weeks.  Do not worry if they come off sooner.      If dressings were used, you may notice some bleeding/drainage on your outer dressings. A little bloody drainage is normal. If the bleeding/drainage is such that the bandage cannot absorb it, remove the dressing, apply clean gauze and apply firm pressure for a full 15 minutes.  If the bleeding continues, please call me.    You may shower tomorrow allowing water to run over the incisions; however, do not scrub the incisions.  No tub baths until your incisions are completely healed.      No lifting > 20 lbs. until you are seen at your follow-up visit.         You have received a prescription for a narcotic pain medicine, as you will have some pain following surgery.   You will not be totally pain free, but your pain medicine should make the pain tolerable.  Please take your pain  medicine as prescribed and always take your pills with food to prevent nausea. If you are having severe pain that cannot be controlled by the pain medicine, please contact me.      You have also received a prescription for an anti-nausea medicine.  Please take this as prescribed for any nausea or vomiting.  Nausea could be a result of the anesthesia or a result of the narcotic pain medicine.  If you experience severe nausea and vomiting that cannot be controlled by the nausea medicine, please call me.      If you had a laparoscopic surgery, it is not unusual to experience pain/discomfort in your shoulders or under your ribs after surgery.  It is from the gas used during the laparoscopic procedure and usually lasts 1-3 days.  The prescription pain medicine is used to treat the surgical pain and does not typically alleviate this “gassy” pain.     No driving for 24 hours and for as long as you are taking your prescription pain medicine.    You will need to call the office at [phone] to schedule a follow-up appointment in 6-10 days.     Remember to contact me for any of the following:    Fever > 101 degrees  Severe pain that cannot be controlled by taking your pain pills  Severe nausea or vomiting that cannot be controlled by taking your nausea pills  Significant bleeding of your incisions  Drainage that has a bad smell or is yellow or green in appearance  Any other questions or concerns      Additional Instruction for Inguinal Hernia Patients Only    If you did not urinate at the hospital after your surgery or if you feel the need to urinate and cannot, this will necessitate a return to the Emergency Room for placement of a urinary catheter.  You should also notify me as well.  As a rule, you should be able to empty your bladder within 4-6 hours after discharge from the hospital.      You may notice some scrotal bruising and/or swelling. A scrotal support or briefs as well as ice packs may be used to alleviate  discomfort.

## 2022-08-23 ENCOUNTER — OFFICE VISIT (OUTPATIENT)
Dept: SURGERY | Facility: CLINIC | Age: 62
End: 2022-08-23

## 2022-08-23 VITALS — WEIGHT: 181 LBS | BODY MASS INDEX: 30.9 KG/M2 | HEIGHT: 64 IN

## 2022-08-23 DIAGNOSIS — Z09 ENCOUNTER FOR FOLLOW-UP: Primary | ICD-10-CM

## 2022-08-23 PROCEDURE — 99024 POSTOP FOLLOW-UP VISIT: CPT | Performed by: PHYSICIAN ASSISTANT

## 2022-08-24 ENCOUNTER — TELEPHONE (OUTPATIENT)
Dept: INTERNAL MEDICINE | Facility: CLINIC | Age: 62
End: 2022-08-24

## 2022-08-24 DIAGNOSIS — E11.65 TYPE 2 DIABETES MELLITUS WITH HYPERGLYCEMIA, WITHOUT LONG-TERM CURRENT USE OF INSULIN: ICD-10-CM

## 2022-08-24 RX ORDER — METFORMIN HYDROCHLORIDE 500 MG/1
1000 TABLET, EXTENDED RELEASE ORAL
Qty: 180 TABLET | Refills: 2 | Status: SHIPPED | OUTPATIENT
Start: 2022-08-24 | End: 2022-12-13 | Stop reason: SDUPTHER

## 2022-08-24 NOTE — TELEPHONE ENCOUNTER
Caller: Joanna Hurley    Relationship: Emergency Contact    Best call back number: [phone]    Requested Prescriptions:   Requested Prescriptions     Pending Prescriptions Disp Refills   • metFORMIN ER (GLUCOPHAGE-XR) 500 MG 24 hr tablet 180 tablet 2     Sig: Take 2 tablets by mouth Daily With Breakfast.        Pharmacy where request should be sent: [facility]     Additional details provided by patient:     Does the patient have less than a 3 day supply:  [x] Yes  [] No    Michelle Donahue Rep   08/24/22 15:52 EDT

## 2022-09-13 ENCOUNTER — OFFICE VISIT (OUTPATIENT)
Dept: INTERNAL MEDICINE | Facility: CLINIC | Age: 62
End: 2022-09-13

## 2022-09-13 ENCOUNTER — LAB (OUTPATIENT)
Dept: LAB | Facility: HOSPITAL | Age: 62
End: 2022-09-13

## 2022-09-13 VITALS
DIASTOLIC BLOOD PRESSURE: 86 MMHG | BODY MASS INDEX: 32.35 KG/M2 | HEART RATE: 73 BPM | RESPIRATION RATE: 18 BRPM | OXYGEN SATURATION: 98 % | WEIGHT: 189.5 LBS | SYSTOLIC BLOOD PRESSURE: 140 MMHG | HEIGHT: 64 IN

## 2022-09-13 DIAGNOSIS — E11.65 TYPE 2 DIABETES MELLITUS WITH HYPERGLYCEMIA, WITHOUT LONG-TERM CURRENT USE OF INSULIN: Primary | ICD-10-CM

## 2022-09-13 LAB
ALBUMIN SERPL-MCNC: 4.2 G/DL (ref 3.5–5.2)
ALBUMIN UR-MCNC: 1.3 MG/DL
ALBUMIN/GLOB SERPL: 1.6 G/DL
ALP SERPL-CCNC: 87 U/L (ref 39–117)
ALT SERPL W P-5'-P-CCNC: 25 U/L (ref 1–41)
ANION GAP SERPL CALCULATED.3IONS-SCNC: 8 MMOL/L (ref 5–15)
AST SERPL-CCNC: 21 U/L (ref 1–40)
BILIRUB SERPL-MCNC: 0.6 MG/DL (ref 0–1.2)
BUN SERPL-MCNC: 14 MG/DL (ref 8–23)
BUN/CREAT SERPL: 13.1 (ref 7–25)
CALCIUM SPEC-SCNC: 8.9 MG/DL (ref 8.6–10.5)
CHLORIDE SERPL-SCNC: 103 MMOL/L (ref 98–107)
CO2 SERPL-SCNC: 28 MMOL/L (ref 22–29)
CREAT SERPL-MCNC: 1.07 MG/DL (ref 0.76–1.27)
CREAT UR-MCNC: 57.5 MG/DL
EGFRCR SERPLBLD CKD-EPI 2021: 79 ML/MIN/1.73
GLOBULIN UR ELPH-MCNC: 2.6 GM/DL
GLUCOSE SERPL-MCNC: 219 MG/DL (ref 65–99)
HBA1C MFR BLD: 9 % (ref 4.8–5.6)
MICROALBUMIN/CREAT UR: 22.6 MG/G
POTASSIUM SERPL-SCNC: 4.5 MMOL/L (ref 3.5–5.2)
PROT SERPL-MCNC: 6.8 G/DL (ref 6–8.5)
SODIUM SERPL-SCNC: 139 MMOL/L (ref 136–145)

## 2022-09-13 PROCEDURE — 82570 ASSAY OF URINE CREATININE: CPT | Performed by: FAMILY MEDICINE

## 2022-09-13 PROCEDURE — 36415 COLL VENOUS BLD VENIPUNCTURE: CPT | Performed by: FAMILY MEDICINE

## 2022-09-13 PROCEDURE — 80053 COMPREHEN METABOLIC PANEL: CPT | Performed by: FAMILY MEDICINE

## 2022-09-13 PROCEDURE — 83036 HEMOGLOBIN GLYCOSYLATED A1C: CPT | Performed by: FAMILY MEDICINE

## 2022-09-13 PROCEDURE — 82043 UR ALBUMIN QUANTITATIVE: CPT | Performed by: FAMILY MEDICINE

## 2022-09-13 PROCEDURE — 84410 TESTOSTERONE BIOAVAILABLE: CPT | Performed by: FAMILY MEDICINE

## 2022-09-13 PROCEDURE — 99213 OFFICE O/P EST LOW 20 MIN: CPT | Performed by: FAMILY MEDICINE

## 2022-09-18 NOTE — PROGRESS NOTES
Please inform the patient of the following abnormal results. I want him to increase his metformin to 1000mg in the AM, and 500mg at night.

## 2022-09-20 LAB
TESTOST SERPL-MCNC: 302 NG/DL (ref 264–916)
TESTOSTERONE.FREE+WB MFR SERPL: 21.1 % (ref 9–46)
TESTOSTERONE.FREE+WB SERPL-MCNC: 63.7 NG/DL (ref 40–250)

## 2022-09-27 NOTE — PROGRESS NOTES
"Chief Complaint  No chief complaint on file.    Subjective        Zane Joshi presents to [facility]  History of Present Illness    Patient presented today's office visit visit.  Patient states that he has not been able to tolerate the Rybelsus.  He is only taking the metformin.  He is on 500 mg twice a day.  He has had his hernia repaired.  He states that his fasting blood glucose levels have come down.    Objective   Vital Signs:  /86   Pulse 73   Resp 18   Ht 162.6 cm (64\")   Wt 86 kg (189 lb 8 oz)   SpO2 98%   BMI 32.53 kg/m²   Estimated body mass index is 32.53 kg/m² as calculated from the following:    Height as of this encounter: 162.6 cm (64\").    Weight as of this encounter: 86 kg (189 lb 8 oz).          Physical Exam  Vitals and nursing note reviewed.   Constitutional:       Appearance: He is well-developed.   HENT:      Head: Normocephalic and atraumatic.   Musculoskeletal:      Cervical back: Normal range of motion and neck supple.   Neurological:      Mental Status: He is alert and oriented to person, place, and time.   Psychiatric:         Behavior: Behavior normal.        Result Review :    Common labs    Common Labs 7/26/22 7/26/22 7/26/22 8/10/22 8/10/22 9/13/22 9/13/22 9/13/22    0907 0907 0907 0905 0905 0850 0850 0850   Glucose  196 (A)   128 (A)  219 (A)    BUN  18   13  14    Creatinine  1.07   0.99  1.07    Sodium  137   137  139    Potassium  4.5   4.0  4.5    Chloride  103   103  103    Calcium  9.2   9.2  8.9    Albumin  4.00     4.20    Total Bilirubin  0.4     0.6    Alkaline Phosphatase  86     87    AST (SGOT)  18     21    ALT (SGPT)  25     25    WBC    3.72       Hemoglobin    16.4       Hematocrit    48.7       Platelets    147       Hemoglobin A1C 10.60 (A)     9.00 (A)     Microalbumin, Urine   <1.2     1.3   (A) Abnormal value       Comments are available for some flowsheets but are not being displayed.                   "   Assessment and Plan   Diagnoses and all orders for this visit:    1. Type 2 diabetes mellitus with hyperglycemia, without long-term current use of insulin (HCC) (Primary)  -     Comprehensive Metabolic Panel  -     Microalbumin / Creatinine Urine Ratio - Urine, Clean Catch  -     Hemoglobin A1c  -     Discussed with patient that he can do metformin 1000 mg in the morning, and 500 mg at night.             Follow Up   No follow-ups on file.  Patient was given instructions and counseling regarding his condition or for health maintenance advice. Please see specific information pulled into the AVS if appropriate.

## 2022-12-13 ENCOUNTER — OFFICE VISIT (OUTPATIENT)
Dept: INTERNAL MEDICINE | Facility: CLINIC | Age: 62
End: 2022-12-13

## 2022-12-13 VITALS
BODY MASS INDEX: 32.95 KG/M2 | SYSTOLIC BLOOD PRESSURE: 142 MMHG | OXYGEN SATURATION: 96 % | WEIGHT: 193 LBS | DIASTOLIC BLOOD PRESSURE: 82 MMHG | HEART RATE: 77 BPM | HEIGHT: 64 IN

## 2022-12-13 DIAGNOSIS — E78.5 HYPERLIPIDEMIA, UNSPECIFIED HYPERLIPIDEMIA TYPE: ICD-10-CM

## 2022-12-13 DIAGNOSIS — I10 ESSENTIAL HYPERTENSION: ICD-10-CM

## 2022-12-13 DIAGNOSIS — E11.65 TYPE 2 DIABETES MELLITUS WITH HYPERGLYCEMIA, WITHOUT LONG-TERM CURRENT USE OF INSULIN: Primary | ICD-10-CM

## 2022-12-13 LAB
CHOLEST SERPL-MCNC: 220 MG/DL (ref 0–200)
HDLC SERPL-MCNC: 32 MG/DL (ref 40–60)
LDLC SERPL CALC-MCNC: 135 MG/DL (ref 0–100)
LDLC/HDLC SERPL: 4.04 {RATIO}
TRIGL SERPL-MCNC: 293 MG/DL (ref 0–150)
VLDLC SERPL CALC-MCNC: 53 MG/DL (ref 5–40)

## 2022-12-13 PROCEDURE — 99214 OFFICE O/P EST MOD 30 MIN: CPT | Performed by: FAMILY MEDICINE

## 2022-12-13 RX ORDER — ATORVASTATIN CALCIUM 10 MG/1
10 TABLET, FILM COATED ORAL DAILY
Qty: 90 TABLET | Refills: 3 | Status: SHIPPED | OUTPATIENT
Start: 2022-12-13 | End: 2023-03-14 | Stop reason: SDUPTHER

## 2022-12-13 RX ORDER — LOSARTAN POTASSIUM 50 MG/1
50 TABLET ORAL DAILY
Qty: 90 TABLET | Refills: 3 | Status: SHIPPED | OUTPATIENT
Start: 2022-12-13 | End: 2023-03-14 | Stop reason: SDUPTHER

## 2022-12-13 RX ORDER — METFORMIN HYDROCHLORIDE 500 MG/1
1000 TABLET, EXTENDED RELEASE ORAL
Qty: 180 TABLET | Refills: 2 | Status: SHIPPED | OUTPATIENT
Start: 2022-12-13 | End: 2022-12-14 | Stop reason: SDUPTHER

## 2022-12-14 DIAGNOSIS — E11.65 TYPE 2 DIABETES MELLITUS WITH HYPERGLYCEMIA, WITHOUT LONG-TERM CURRENT USE OF INSULIN: ICD-10-CM

## 2022-12-14 LAB
ALBUMIN SERPL-MCNC: 4.7 G/DL (ref 3.5–5.2)
ALBUMIN/GLOB SERPL: 2 G/DL
ALP SERPL-CCNC: 91 U/L (ref 39–117)
ALT SERPL-CCNC: 32 U/L (ref 1–41)
AST SERPL-CCNC: 23 U/L (ref 1–40)
BILIRUB SERPL-MCNC: 0.7 MG/DL (ref 0–1.2)
BUN SERPL-MCNC: 16 MG/DL (ref 8–23)
BUN/CREAT SERPL: 16 (ref 7–25)
CALCIUM SERPL-MCNC: 9.6 MG/DL (ref 8.6–10.5)
CHLORIDE SERPL-SCNC: 104 MMOL/L (ref 98–107)
CO2 SERPL-SCNC: 27.5 MMOL/L (ref 22–29)
CREAT SERPL-MCNC: 1 MG/DL (ref 0.76–1.27)
EGFRCR SERPLBLD CKD-EPI 2021: 85.6 ML/MIN/1.73
GLOBULIN SER CALC-MCNC: 2.4 GM/DL
GLUCOSE SERPL-MCNC: 200 MG/DL (ref 65–99)
HBA1C MFR BLD: 9.1 % (ref 4.8–5.6)
POTASSIUM SERPL-SCNC: 4.9 MMOL/L (ref 3.5–5.2)
PROT SERPL-MCNC: 7.1 G/DL (ref 6–8.5)
SODIUM SERPL-SCNC: 140 MMOL/L (ref 136–145)

## 2022-12-14 RX ORDER — METFORMIN HYDROCHLORIDE 500 MG/1
1000 TABLET, EXTENDED RELEASE ORAL 2 TIMES DAILY
Qty: 360 TABLET | Refills: 3 | Status: SHIPPED | OUTPATIENT
Start: 2022-12-14 | End: 2023-03-14 | Stop reason: SDUPTHER

## 2022-12-14 NOTE — PROGRESS NOTES
Please inform the patient of the following abnormal results.  Hemoglobin A1c is worsening, patient needs to start Jardiance 25 mg daily.  His cholesterol is also high, I did start him on his cholesterol medication.  His metformin he should be doing total of 1000 mg in the morning as well as 1000 mg in the evening.

## 2022-12-18 NOTE — PROGRESS NOTES
"Chief Complaint  follow up diabetes    Subjective        Zane Joshi presents to [facility]  History of Present Illness    Patient with history of hyperlipidemia.  In the past he has been taking Lipitor.  He is currently not taking Lipitor.  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    Patient has a history having essential hypertension.  In the past he was taking losartan, and he states that he is not taking this medicine.  He would like to restart taking this medication.  Today's blood pressure 142/82.    He does have type 2 diabetes.  He is current taking metformin 1000 mg daily.  He denies any side effects of the medication.    Objective   Vital Signs:  /82 (BP Location: Left arm, Patient Position: Sitting, Cuff Size: Adult)   Pulse 77   Ht 162 cm (63.78\")   Wt 87.5 kg (193 lb)   SpO2 96%   BMI 33.36 kg/m²   Estimated body mass index is 33.36 kg/m² as calculated from the following:    Height as of this encounter: 162 cm (63.78\").    Weight as of this encounter: 87.5 kg (193 lb).          Physical Exam  Vitals and nursing note reviewed.   Constitutional:       Appearance: He is well-developed.   HENT:      Head: Normocephalic and atraumatic.   Musculoskeletal:      Cervical back: Normal range of motion and neck supple.   Neurological:      Mental Status: He is alert and oriented to person, place, and time.   Psychiatric:         Behavior: Behavior normal.        Result Review :                Assessment and Plan   Diagnoses and all orders for this visit:    1. Type 2 diabetes mellitus with hyperglycemia, without long-term current use of insulin (HCC) (Primary)  -     Discontinue: metFORMIN ER (GLUCOPHAGE-XR) 500 MG 24 hr tablet; Take 2 tablets by mouth Daily With Breakfast.  Dispense: 180 tablet; Refill: 2  -     Comprehensive Metabolic Panel  -     Hemoglobin A1c  -     Comprehensive Metabolic Panel  -     Hemoglobin A1c    2. Essential hypertension  -     losartan " (Cozaar) 50 MG tablet; Take 1 tablet by mouth Daily.  Dispense: 90 tablet; Refill: 3    3. Hyperlipidemia, unspecified hyperlipidemia type  -     Lipid Panel With LDL / HDL Ratio  -     atorvastatin (Lipitor) 10 MG tablet; Take 1 tablet by mouth Daily.  Dispense: 90 tablet; Refill: 3    For his type 2 diabetes he needs to do metformin 1000 mg twice a day, if his hemoglobin A1c continues to high he may need to start Jardiance.  For essential hypertension we will start him on losartan 50 mg daily.  For his hyperlipidemia, we will start him on Lipitor 10 mg daily.         Follow Up   No follow-ups on file.  Patient was given instructions and counseling regarding his condition or for health maintenance advice. Please see specific information pulled into the AVS if appropriate.

## 2023-03-14 ENCOUNTER — OFFICE VISIT (OUTPATIENT)
Dept: INTERNAL MEDICINE | Facility: CLINIC | Age: 63
End: 2023-03-14
Payer: COMMERCIAL

## 2023-03-14 ENCOUNTER — LAB (OUTPATIENT)
Dept: LAB | Facility: HOSPITAL | Age: 63
End: 2023-03-14
Payer: COMMERCIAL

## 2023-03-14 VITALS
HEIGHT: 63 IN | WEIGHT: 195.4 LBS | DIASTOLIC BLOOD PRESSURE: 84 MMHG | TEMPERATURE: 95.6 F | OXYGEN SATURATION: 97 % | BODY MASS INDEX: 34.62 KG/M2 | HEART RATE: 75 BPM | SYSTOLIC BLOOD PRESSURE: 136 MMHG | RESPIRATION RATE: 16 BRPM

## 2023-03-14 DIAGNOSIS — E78.5 HYPERLIPIDEMIA, UNSPECIFIED HYPERLIPIDEMIA TYPE: ICD-10-CM

## 2023-03-14 DIAGNOSIS — E11.65 TYPE 2 DIABETES MELLITUS WITH HYPERGLYCEMIA, WITHOUT LONG-TERM CURRENT USE OF INSULIN: Primary | ICD-10-CM

## 2023-03-14 DIAGNOSIS — I10 ESSENTIAL HYPERTENSION: ICD-10-CM

## 2023-03-14 LAB
ALBUMIN SERPL-MCNC: 4.5 G/DL (ref 3.5–5.2)
ALBUMIN UR-MCNC: 3.1 MG/DL
ALBUMIN/GLOB SERPL: 1.7 G/DL
ALP SERPL-CCNC: 100 U/L (ref 39–117)
ALT SERPL W P-5'-P-CCNC: 36 U/L (ref 1–41)
ANION GAP SERPL CALCULATED.3IONS-SCNC: 8.9 MMOL/L (ref 5–15)
AST SERPL-CCNC: 27 U/L (ref 1–40)
BASOPHILS # BLD AUTO: 0.03 10*3/MM3 (ref 0–0.2)
BASOPHILS NFR BLD AUTO: 1 % (ref 0–1.5)
BILIRUB SERPL-MCNC: 0.6 MG/DL (ref 0–1.2)
BUN SERPL-MCNC: 15 MG/DL (ref 8–23)
BUN/CREAT SERPL: 16 (ref 7–25)
CALCIUM SPEC-SCNC: 9.2 MG/DL (ref 8.6–10.5)
CHLORIDE SERPL-SCNC: 103 MMOL/L (ref 98–107)
CHOLEST SERPL-MCNC: 192 MG/DL (ref 0–200)
CO2 SERPL-SCNC: 24.1 MMOL/L (ref 22–29)
CREAT SERPL-MCNC: 0.94 MG/DL (ref 0.76–1.27)
CREAT UR-MCNC: 89.4 MG/DL
DEPRECATED RDW RBC AUTO: 38.5 FL (ref 37–54)
EGFRCR SERPLBLD CKD-EPI 2021: 92.2 ML/MIN/1.73
EOSINOPHIL # BLD AUTO: 0.11 10*3/MM3 (ref 0–0.4)
EOSINOPHIL NFR BLD AUTO: 3.5 % (ref 0.3–6.2)
ERYTHROCYTE [DISTWIDTH] IN BLOOD BY AUTOMATED COUNT: 12.3 % (ref 12.3–15.4)
GLOBULIN UR ELPH-MCNC: 2.6 GM/DL
GLUCOSE SERPL-MCNC: 292 MG/DL (ref 65–99)
HBA1C MFR BLD: 9.9 % (ref 4.8–5.6)
HCT VFR BLD AUTO: 44.6 % (ref 37.5–51)
HDLC SERPL-MCNC: 30 MG/DL (ref 40–60)
HGB BLD-MCNC: 15.8 G/DL (ref 13–17.7)
IMM GRANULOCYTES # BLD AUTO: 0.01 10*3/MM3 (ref 0–0.05)
IMM GRANULOCYTES NFR BLD AUTO: 0.3 % (ref 0–0.5)
LDLC SERPL CALC-MCNC: 100 MG/DL (ref 0–100)
LDLC/HDLC SERPL: 2.95 {RATIO}
LYMPHOCYTES # BLD AUTO: 1.1 10*3/MM3 (ref 0.7–3.1)
LYMPHOCYTES NFR BLD AUTO: 35 % (ref 19.6–45.3)
MCH RBC QN AUTO: 30.8 PG (ref 26.6–33)
MCHC RBC AUTO-ENTMCNC: 35.4 G/DL (ref 31.5–35.7)
MCV RBC AUTO: 86.9 FL (ref 79–97)
MICROALBUMIN/CREAT UR: 34.7 MG/G
MONOCYTES # BLD AUTO: 0.29 10*3/MM3 (ref 0.1–0.9)
MONOCYTES NFR BLD AUTO: 9.2 % (ref 5–12)
NEUTROPHILS NFR BLD AUTO: 1.6 10*3/MM3 (ref 1.7–7)
NEUTROPHILS NFR BLD AUTO: 51 % (ref 42.7–76)
NRBC BLD AUTO-RTO: 0 /100 WBC (ref 0–0.2)
PLATELET # BLD AUTO: 128 10*3/MM3 (ref 140–450)
PMV BLD AUTO: 10.3 FL (ref 6–12)
POTASSIUM SERPL-SCNC: 4.3 MMOL/L (ref 3.5–5.2)
PROT SERPL-MCNC: 7.1 G/DL (ref 6–8.5)
RBC # BLD AUTO: 5.13 10*6/MM3 (ref 4.14–5.8)
SODIUM SERPL-SCNC: 136 MMOL/L (ref 136–145)
TRIGL SERPL-MCNC: 367 MG/DL (ref 0–150)
VLDLC SERPL-MCNC: 62 MG/DL (ref 5–40)
WBC NRBC COR # BLD: 3.14 10*3/MM3 (ref 3.4–10.8)

## 2023-03-14 PROCEDURE — 80061 LIPID PANEL: CPT | Performed by: FAMILY MEDICINE

## 2023-03-14 PROCEDURE — 85025 COMPLETE CBC W/AUTO DIFF WBC: CPT | Performed by: FAMILY MEDICINE

## 2023-03-14 PROCEDURE — 82043 UR ALBUMIN QUANTITATIVE: CPT | Performed by: FAMILY MEDICINE

## 2023-03-14 PROCEDURE — 36415 COLL VENOUS BLD VENIPUNCTURE: CPT | Performed by: FAMILY MEDICINE

## 2023-03-14 PROCEDURE — 83036 HEMOGLOBIN GLYCOSYLATED A1C: CPT | Performed by: FAMILY MEDICINE

## 2023-03-14 PROCEDURE — 99214 OFFICE O/P EST MOD 30 MIN: CPT | Performed by: FAMILY MEDICINE

## 2023-03-14 PROCEDURE — 80053 COMPREHEN METABOLIC PANEL: CPT | Performed by: FAMILY MEDICINE

## 2023-03-14 PROCEDURE — 82570 ASSAY OF URINE CREATININE: CPT | Performed by: FAMILY MEDICINE

## 2023-03-14 RX ORDER — LOSARTAN POTASSIUM 50 MG/1
50 TABLET ORAL DAILY
Qty: 90 TABLET | Refills: 3 | Status: SHIPPED | OUTPATIENT
Start: 2023-03-14

## 2023-03-14 RX ORDER — ATORVASTATIN CALCIUM 10 MG/1
10 TABLET, FILM COATED ORAL DAILY
Qty: 90 TABLET | Refills: 3 | Status: SHIPPED | OUTPATIENT
Start: 2023-03-14

## 2023-03-14 RX ORDER — METFORMIN HYDROCHLORIDE 500 MG/1
1000 TABLET, EXTENDED RELEASE ORAL
Qty: 90 TABLET | Refills: 2 | Status: SHIPPED | OUTPATIENT
Start: 2023-03-14

## 2023-03-18 NOTE — PROGRESS NOTES
"Chief Complaint  Diabetes (Follow-up)    Subjective        Zane Joshi presents to [facility]  History of Present Illness    Patient presents at today's office visit with a history having type 2 diabetes.  He is currently taking metformin 1000 mg twice a day..  He denies any side effects of the medication.  He states that he unable to get the Jardiance due to the cost of the medication.  I did discuss with him at today's office his last hemoglobin A1c was fairly high and that would need to start the medication.  I did give him plenty of samples at today's visit.    He does have essential hypertension.  Blood pressure is 136/84.  He is currently taking losartan 50 mg daily.  He denies any side effects of the medication.    Patient does have a history having hyperlipidemia.  He is currently taking Lipitor 10 mg daily.  Denies any side effects of the medication.    Objective   Vital Signs:  /84   Pulse 75   Temp 95.6 °F (35.3 °C)   Resp 16   Ht 160 cm (63\")   Wt 88.6 kg (195 lb 6.4 oz)   SpO2 97%   BMI 34.61 kg/m²   Estimated body mass index is 34.61 kg/m² as calculated from the following:    Height as of this encounter: 160 cm (63\").    Weight as of this encounter: 88.6 kg (195 lb 6.4 oz).             Physical Exam   Result Review :    Common labs    Common Labs 9/13/22 9/13/22 9/13/22 12/13/22 12/13/22 12/13/22 3/14/23 3/14/23 3/14/23 3/14/23 3/14/23    0850 0850 0850 0829 0829 0833 0854 0854 0854 0854 0854   Glucose  219 (A)  200 (A)     292 (A)     BUN  14  16     15     Creatinine  1.07  1.00     0.94     Sodium  139  140     136     Potassium  4.5  4.9     4.3     Chloride  103  104     103     Calcium  8.9  9.6     9.2     Total Protein    7.1          Albumin  4.20  4.70     4.5     Total Bilirubin  0.6  0.7     0.6     Alkaline Phosphatase  87  91     100     AST (SGOT)  21  23     27     ALT (SGPT)  25  32     36     WBC       3.14 (A)       Hemoglobin       " 15.8       Hematocrit       44.6       Platelets       128 (A)       Total Cholesterol           192   Total Cholesterol      220 (A)        Triglycerides      293 (A)     367 (A)   HDL Cholesterol      32 (A)     30 (A)   LDL Cholesterol       135 (A)     100   Hemoglobin A1C 9.00 (A)    9.10 (A)   9.90 (A)      Microalbumin, Urine   1.3       3.1    (A) Abnormal value       Comments are available for some flowsheets but are not being displayed.                        Assessment and Plan   Diagnoses and all orders for this visit:    1. Type 2 diabetes mellitus with hyperglycemia, without long-term current use of insulin (HCC) (Primary)  -     empagliflozin (Jardiance) 25 MG tablet tablet; Take 1 tablet by mouth Daily.  Dispense: 90 tablet; Refill: 3  -     metFORMIN ER (GLUCOPHAGE-XR) 500 MG 24 hr tablet; Take 2 tablets by mouth Daily With Breakfast.  Dispense: 90 tablet; Refill: 2  -     Cancel: Hemoglobin A1c  -     Microalbumin / Creatinine Urine Ratio - Urine, Clean Catch    2. Essential hypertension  -     losartan (Cozaar) 50 MG tablet; Take 1 tablet by mouth Daily.  Dispense: 90 tablet; Refill: 3  -     Cancel: Comprehensive Metabolic Panel  -     CBC & Differential    3. Hyperlipidemia, unspecified hyperlipidemia type  -     atorvastatin (Lipitor) 10 MG tablet; Take 1 tablet by mouth Daily.  Dispense: 90 tablet; Refill: 3  -     Lipid Panel With LDL / HDL Ratio    For patient's hyperlipidemia, we will continue him on Lipitor 10 mg daily.  For essential hypertension, we will continue him on losartan 50 mg daily.  For the type 2 diabetes, continue metformin, would like him to start the Jardiance 25 mg daily.  We will check several labs at today's visit.         Follow Up   No follow-ups on file.  Patient was given instructions and counseling regarding his condition or for health maintenance advice. Please see specific information pulled into the AVS if appropriate.

## 2023-03-19 DIAGNOSIS — E11.65 TYPE 2 DIABETES MELLITUS WITH HYPERGLYCEMIA, WITHOUT LONG-TERM CURRENT USE OF INSULIN: Primary | ICD-10-CM

## 2023-03-19 RX ORDER — ORAL SEMAGLUTIDE 7 MG/1
7 TABLET ORAL DAILY
Qty: 90 TABLET | Refills: 2 | Status: SHIPPED | OUTPATIENT
Start: 2023-03-19

## 2023-03-19 NOTE — PROGRESS NOTES
Please inform the patient of the following abnormal results. Needs to do metofmin 1000mg bid. Needs to do jardiance as discussed. Will start rybelsus. I sent in 7mg to pharmacy. We need to call him to give him samples of the 3mg for first month.

## 2023-07-20 ENCOUNTER — PATIENT OUTREACH (OUTPATIENT)
Dept: CASE MANAGEMENT | Facility: OTHER | Age: 63
End: 2023-07-20
Payer: COMMERCIAL

## 2023-07-20 NOTE — OUTREACH NOTE
AMBULATORY CASE MANAGEMENT NOTE    Name and Relationship of Patient/Support Person: Zane Joshi - Self    Zane not interested in ECM.  Agreed to receive [employer] info to wife's email.        VANESA PANDA  Ambulatory Case Management    7/20/2023, 13:37 EDT

## 2023-08-01 ENCOUNTER — LAB (OUTPATIENT)
Dept: LAB | Facility: HOSPITAL | Age: 63
End: 2023-08-01
Payer: COMMERCIAL

## 2023-08-01 ENCOUNTER — OFFICE VISIT (OUTPATIENT)
Dept: INTERNAL MEDICINE | Facility: CLINIC | Age: 63
End: 2023-08-01
Payer: COMMERCIAL

## 2023-08-01 VITALS
HEART RATE: 81 BPM | RESPIRATION RATE: 16 BRPM | OXYGEN SATURATION: 99 % | BODY MASS INDEX: 31.24 KG/M2 | SYSTOLIC BLOOD PRESSURE: 130 MMHG | HEIGHT: 64 IN | WEIGHT: 183 LBS | DIASTOLIC BLOOD PRESSURE: 86 MMHG

## 2023-08-01 DIAGNOSIS — E11.65 TYPE 2 DIABETES MELLITUS WITH HYPERGLYCEMIA, WITHOUT LONG-TERM CURRENT USE OF INSULIN: ICD-10-CM

## 2023-08-01 DIAGNOSIS — I10 ESSENTIAL HYPERTENSION: ICD-10-CM

## 2023-08-01 DIAGNOSIS — Z00.00 HEALTHCARE MAINTENANCE: ICD-10-CM

## 2023-08-01 DIAGNOSIS — E11.65 TYPE 2 DIABETES MELLITUS WITH HYPERGLYCEMIA, WITHOUT LONG-TERM CURRENT USE OF INSULIN: Primary | ICD-10-CM

## 2023-08-01 DIAGNOSIS — N52.9 ERECTILE DYSFUNCTION, UNSPECIFIED ERECTILE DYSFUNCTION TYPE: ICD-10-CM

## 2023-08-01 DIAGNOSIS — E78.5 HYPERLIPIDEMIA, UNSPECIFIED HYPERLIPIDEMIA TYPE: ICD-10-CM

## 2023-08-01 LAB
ALBUMIN SERPL-MCNC: 4.5 G/DL (ref 3.5–5.2)
ALBUMIN UR-MCNC: 1.3 MG/DL
ALBUMIN/GLOB SERPL: 1.6 G/DL
ALP SERPL-CCNC: 91 U/L (ref 39–117)
ALT SERPL W P-5'-P-CCNC: 39 U/L (ref 1–41)
ANION GAP SERPL CALCULATED.3IONS-SCNC: 13.5 MMOL/L (ref 5–15)
AST SERPL-CCNC: 23 U/L (ref 1–40)
BILIRUB SERPL-MCNC: 0.6 MG/DL (ref 0–1.2)
BUN SERPL-MCNC: 18 MG/DL (ref 8–23)
BUN/CREAT SERPL: 17.3 (ref 7–25)
CALCIUM SPEC-SCNC: 9.4 MG/DL (ref 8.6–10.5)
CHLORIDE SERPL-SCNC: 103 MMOL/L (ref 98–107)
CHOLEST SERPL-MCNC: 198 MG/DL (ref 0–200)
CHOLEST SERPL-MCNC: 201 MG/DL (ref 0–200)
CO2 SERPL-SCNC: 23.5 MMOL/L (ref 22–29)
CREAT SERPL-MCNC: 1.04 MG/DL (ref 0.76–1.27)
CREAT UR-MCNC: 50.5 MG/DL
EGFRCR SERPLBLD CKD-EPI 2021: 81.2 ML/MIN/1.73
GLOBULIN UR ELPH-MCNC: 2.9 GM/DL
GLUCOSE SERPL-MCNC: 202 MG/DL (ref 65–99)
HBA1C MFR BLD: 8.7 % (ref 4.8–5.6)
HCV AB SER DONR QL: NORMAL
HDLC SERPL-MCNC: 27 MG/DL (ref 40–60)
HDLC SERPL-MCNC: 28 MG/DL (ref 40–60)
LDLC SERPL CALC-MCNC: 81 MG/DL (ref 0–100)
LDLC SERPL CALC-MCNC: 85 MG/DL (ref 0–100)
LDLC/HDLC SERPL: 2.09 {RATIO}
LDLC/HDLC SERPL: 2.39 {RATIO}
MICROALBUMIN/CREAT UR: 25.7 MG/G
POTASSIUM SERPL-SCNC: 4.6 MMOL/L (ref 3.5–5.2)
PROT SERPL-MCNC: 7.4 G/DL (ref 6–8.5)
SODIUM SERPL-SCNC: 140 MMOL/L (ref 136–145)
TRIGL SERPL-MCNC: 532 MG/DL (ref 0–150)
TRIGL SERPL-MCNC: 573 MG/DL (ref 0–150)
VLDLC SERPL-MCNC: 86 MG/DL (ref 5–40)
VLDLC SERPL-MCNC: 92 MG/DL (ref 5–40)

## 2023-08-01 PROCEDURE — 80061 LIPID PANEL: CPT | Performed by: FAMILY MEDICINE

## 2023-08-01 PROCEDURE — 36415 COLL VENOUS BLD VENIPUNCTURE: CPT | Performed by: FAMILY MEDICINE

## 2023-08-01 PROCEDURE — 82043 UR ALBUMIN QUANTITATIVE: CPT | Performed by: FAMILY MEDICINE

## 2023-08-01 PROCEDURE — 83036 HEMOGLOBIN GLYCOSYLATED A1C: CPT

## 2023-08-01 PROCEDURE — 86803 HEPATITIS C AB TEST: CPT

## 2023-08-01 PROCEDURE — 80053 COMPREHEN METABOLIC PANEL: CPT | Performed by: FAMILY MEDICINE

## 2023-08-01 PROCEDURE — 82570 ASSAY OF URINE CREATININE: CPT | Performed by: FAMILY MEDICINE

## 2023-08-01 PROCEDURE — 80061 LIPID PANEL: CPT

## 2023-08-01 RX ORDER — METFORMIN HYDROCHLORIDE 500 MG/1
1000 TABLET, EXTENDED RELEASE ORAL 2 TIMES DAILY
Qty: 360 TABLET | Refills: 3 | Status: SHIPPED | OUTPATIENT
Start: 2023-08-01

## 2023-08-01 RX ORDER — TADALAFIL 20 MG/1
20 TABLET ORAL DAILY PRN
Qty: 30 TABLET | Refills: 2 | Status: SHIPPED | OUTPATIENT
Start: 2023-08-01

## 2023-08-06 RX ORDER — ICOSAPENT ETHYL 500 MG/1
4 CAPSULE ORAL 2 TIMES DAILY
Qty: 720 CAPSULE | Refills: 3 | Status: SHIPPED | OUTPATIENT
Start: 2023-08-06

## 2023-08-06 NOTE — PROGRESS NOTES
Please inform the patient of the following abnormal results. Triglycerides are elevated. Needs to start vascepa. He needs to do dm2 meds as discussed with the patient during office visit.

## 2023-08-07 ENCOUNTER — TELEPHONE (OUTPATIENT)
Dept: INTERNAL MEDICINE | Facility: CLINIC | Age: 63
End: 2023-08-07

## 2023-08-07 NOTE — PROGRESS NOTES
"Chief Complaint  Diabetes    Subjective        Zane Joshi presents to [facility]  Diabetes      Patient has a history of type 2 diabetes.  We did discuss today's visit but perhaps seeing a podiatrist for diabetic foot exam.  His hemoglobin A1c has historically been elevated.  He is only taking metformin 1000 mg daily.  He is also on Jardiance 25 mg daily.    He has hyperlipidemia.  Currently Lipitor 10 mg daily.  Patient denies any side effects of the medication.  Also give Vascepa 2 g twice a day.  Patient denies any side effects of the medicine.    Patient has a history having erectile dysfunction.  Currently on Cialis 10 mg daily.  Patient states that he would like to try the higher strength.    Patient also has essential hypertension.  Patient is currently taking losartan 50 mg daily.  Patient denies any side effects of the medication.    Objective   Vital Signs:  /86 (BP Location: Left arm, Patient Position: Sitting, Cuff Size: Adult)   Pulse 81   Resp 16   Ht 162.6 cm (64\")   Wt 83 kg (183 lb)   SpO2 99%   BMI 31.41 kg/mý   Estimated body mass index is 31.41 kg/mý as calculated from the following:    Height as of this encounter: 162.6 cm (64\").    Weight as of this encounter: 83 kg (183 lb).             Physical Exam  Vitals and nursing note reviewed.   Constitutional:       Appearance: He is well-developed.   HENT:      Head: Normocephalic and atraumatic.   Musculoskeletal:      Cervical back: Normal range of motion and neck supple.   Neurological:      Mental Status: He is alert and oriented to person, place, and time.   Psychiatric:         Behavior: Behavior normal.      Result Review :                   Assessment and Plan   Diagnoses and all orders for this visit:    1. Type 2 diabetes mellitus with hyperglycemia, without long-term current use of insulin (Primary)  -     Ambulatory Referral to Podiatry  -     Hemoglobin A1c; Future  -     MicroAlbumin, " Urine, Random - Urine, Clean Catch; Future  -     metFORMIN ER (GLUCOPHAGE-XR) 500 MG 24 hr tablet; Take 2 tablets by mouth 2 (Two) Times a Day.  Dispense: 360 tablet; Refill: 3  -     Comprehensive Metabolic Panel  -     Cancel: Hemoglobin A1c  -     Microalbumin / Creatinine Urine Ratio - Urine, Clean Catch  -     Continuous Blood Gluc Sensor (FreeStyle Keaton 2 Sensor) misc; 1 each Every 14 (Fourteen) Days.  Dispense: 2 each; Refill: 6    2. Essential hypertension  -     Comprehensive Metabolic Panel    3. Hyperlipidemia, unspecified hyperlipidemia type  -     Lipid panel; Future  -     Lipid Panel With LDL / HDL Ratio    4. Healthcare maintenance  -     Hepatitis C antibody; Future    5. Erectile dysfunction, unspecified erectile dysfunction type  -     tadalafil (Cialis) 20 MG tablet; Take 1 tablet by mouth Daily As Needed for Erectile Dysfunction.  Dispense: 30 tablet; Refill: 2    For patient's type 2 diabetes it seems to be out of control.  I did discuss with him at today's office visit that he needs to take his metformin a total of 1000 mg in the morning and 1000 mg at night.  He can also take his Jardiance 25 mg during the day.  I do think he would benefit from freestyle keaton 2 sensor.  For the essential hypertension, continue losartan 50 mg daily.  For the hyperlipidemia, continue Lipitor 10 mg daily.  For his erectile dysfunction, like to increase his Cialis to 20 mg daily.    During the office visit, I placed a FreeStyle Keaton 2 on the patient which is good for 14 days.  I provided the patient with the equipment, apply the sensor, and calibrated the sensor for the patient with his smart phone.  Patient was also given further information regarding this.         Follow Up   No follow-ups on file.  Patient was given instructions and counseling regarding his condition or for health maintenance advice. Please see specific information pulled into the AVS if appropriate.

## 2023-08-07 NOTE — TELEPHONE ENCOUNTER
Comments    2X - PT DECLINED TO SCHEDULE APPT, PT WAS UNAWARE OF REFERRAL AND WOULD LIKE A CALL BACK FROM DR QUEEN.  ROUTING BACK TO REFERRING PRACTICE

## 2023-10-10 ENCOUNTER — OFFICE VISIT (OUTPATIENT)
Dept: INTERNAL MEDICINE | Facility: CLINIC | Age: 63
End: 2023-10-10
Payer: COMMERCIAL

## 2023-10-10 ENCOUNTER — LAB (OUTPATIENT)
Dept: LAB | Facility: HOSPITAL | Age: 63
End: 2023-10-10
Payer: COMMERCIAL

## 2023-10-10 VITALS
DIASTOLIC BLOOD PRESSURE: 80 MMHG | SYSTOLIC BLOOD PRESSURE: 122 MMHG | HEIGHT: 64 IN | BODY MASS INDEX: 31.76 KG/M2 | TEMPERATURE: 96.4 F | WEIGHT: 186 LBS | OXYGEN SATURATION: 93 % | HEART RATE: 61 BPM | RESPIRATION RATE: 18 BRPM

## 2023-10-10 DIAGNOSIS — E78.5 HYPERLIPIDEMIA, UNSPECIFIED HYPERLIPIDEMIA TYPE: ICD-10-CM

## 2023-10-10 DIAGNOSIS — E11.65 TYPE 2 DIABETES MELLITUS WITH HYPERGLYCEMIA, WITHOUT LONG-TERM CURRENT USE OF INSULIN: Primary | ICD-10-CM

## 2023-10-10 LAB
ALBUMIN SERPL-MCNC: 4.3 G/DL (ref 3.5–5.2)
ALBUMIN UR-MCNC: <1.2 MG/DL
ALBUMIN/GLOB SERPL: 1.7 G/DL
ALP SERPL-CCNC: 79 U/L (ref 39–117)
ALT SERPL W P-5'-P-CCNC: 27 U/L (ref 1–41)
ANION GAP SERPL CALCULATED.3IONS-SCNC: 9.6 MMOL/L (ref 5–15)
AST SERPL-CCNC: 19 U/L (ref 1–40)
BILIRUB SERPL-MCNC: 0.6 MG/DL (ref 0–1.2)
BUN SERPL-MCNC: 18 MG/DL (ref 8–23)
BUN/CREAT SERPL: 15.1 (ref 7–25)
CALCIUM SPEC-SCNC: 9.2 MG/DL (ref 8.6–10.5)
CHLORIDE SERPL-SCNC: 103 MMOL/L (ref 98–107)
CHOLEST SERPL-MCNC: 178 MG/DL (ref 0–200)
CO2 SERPL-SCNC: 24.4 MMOL/L (ref 22–29)
CREAT SERPL-MCNC: 1.19 MG/DL (ref 0.76–1.27)
CREAT UR-MCNC: 82.9 MG/DL
EGFRCR SERPLBLD CKD-EPI 2021: 69.1 ML/MIN/1.73
GLOBULIN UR ELPH-MCNC: 2.5 GM/DL
GLUCOSE SERPL-MCNC: 175 MG/DL (ref 65–99)
HBA1C MFR BLD: 7.5 % (ref 4.8–5.6)
HDLC SERPL-MCNC: 31 MG/DL (ref 40–60)
LDLC SERPL CALC-MCNC: 96 MG/DL (ref 0–100)
LDLC/HDLC SERPL: 2.81 {RATIO}
MICROALBUMIN/CREAT UR: NORMAL MG/G{CREAT}
POTASSIUM SERPL-SCNC: 4.4 MMOL/L (ref 3.5–5.2)
PROT SERPL-MCNC: 6.8 G/DL (ref 6–8.5)
SODIUM SERPL-SCNC: 137 MMOL/L (ref 136–145)
TRIGL SERPL-MCNC: 300 MG/DL (ref 0–150)
VLDLC SERPL-MCNC: 51 MG/DL (ref 5–40)

## 2023-10-10 PROCEDURE — 82570 ASSAY OF URINE CREATININE: CPT | Performed by: FAMILY MEDICINE

## 2023-10-10 PROCEDURE — 82043 UR ALBUMIN QUANTITATIVE: CPT | Performed by: FAMILY MEDICINE

## 2023-10-10 PROCEDURE — 99214 OFFICE O/P EST MOD 30 MIN: CPT | Performed by: FAMILY MEDICINE

## 2023-10-10 PROCEDURE — 80053 COMPREHEN METABOLIC PANEL: CPT | Performed by: FAMILY MEDICINE

## 2023-10-10 PROCEDURE — 83036 HEMOGLOBIN GLYCOSYLATED A1C: CPT | Performed by: FAMILY MEDICINE

## 2023-10-10 PROCEDURE — 80061 LIPID PANEL: CPT | Performed by: FAMILY MEDICINE

## 2023-10-10 PROCEDURE — 36415 COLL VENOUS BLD VENIPUNCTURE: CPT | Performed by: FAMILY MEDICINE

## 2023-10-13 NOTE — PROGRESS NOTES
"Chief Complaint  Diabetes    Subjective        Zane Joshi presents to [facility]  History of Present Illness    Patient is history having  type 2 diabetes.  Patient states that for his metformin he is only taking it twice a day.  Which is 500 mg twice a day.  I did discuss with him that I would like him to do it at a much higher dose which was prescribed initially.  He is also taking Jardiance 25 mg daily.    Patient has hyperlipidemia.  Patient currently Lipitor 10 mg daily.  Patient is also taking Vascepa 2 g twice a day.      Objective   Vital Signs:  /80   Pulse 61   Temp 96.4 °F (35.8 °C)   Resp 18   Ht 162 cm (63.78\")   Wt 84.4 kg (186 lb)   SpO2 93%   BMI 32.15 kg/m²   Estimated body mass index is 32.15 kg/m² as calculated from the following:    Height as of this encounter: 162 cm (63.78\").    Weight as of this encounter: 84.4 kg (186 lb).             Physical Exam  Vitals and nursing note reviewed.   Constitutional:       Appearance: He is well-developed.   HENT:      Head: Normocephalic and atraumatic.   Musculoskeletal:      Cervical back: Normal range of motion and neck supple.   Neurological:      Mental Status: He is alert and oriented to person, place, and time.   Psychiatric:         Behavior: Behavior normal.      Result Review :                   Assessment and Plan   Diagnoses and all orders for this visit:    1. Type 2 diabetes mellitus with hyperglycemia, without long-term current use of insulin (Primary)  -     Comprehensive Metabolic Panel  -     Hemoglobin A1c  -     Microalbumin / Creatinine Urine Ratio - Urine, Clean Catch    2. Hyperlipidemia, unspecified hyperlipidemia type  -     Lipid Panel With LDL / HDL Ratio    For patient's type 2 diabetes, we will continue him on Jardiance as well as metformin.  I did advise him that the metformin 500 mg I would like him to do at least 3 times a day for a total of 1500 mg daily.  He states that he " is not willing to do up to 2000 mg daily.  He will continue taking Jardiance 25 mg daily.  For the hyperlipidemia, continue Lipitor as well as Vascepa.         Follow Up   No follow-ups on file.  Patient was given instructions and counseling regarding his condition or for health maintenance advice. Please see specific information pulled into the AVS if appropriate.

## 2023-10-13 NOTE — PROGRESS NOTES
Please inform the patient of the following abnormal results. Elevated triglycerides. The hba1c is coming down. Continue to make changes we discussed in office visit.

## 2024-01-16 ENCOUNTER — OFFICE VISIT (OUTPATIENT)
Dept: INTERNAL MEDICINE | Facility: CLINIC | Age: 64
End: 2024-01-16
Payer: COMMERCIAL

## 2024-01-16 VITALS
HEIGHT: 64 IN | SYSTOLIC BLOOD PRESSURE: 128 MMHG | DIASTOLIC BLOOD PRESSURE: 82 MMHG | BODY MASS INDEX: 32.44 KG/M2 | OXYGEN SATURATION: 94 % | RESPIRATION RATE: 14 BRPM | HEART RATE: 82 BPM | WEIGHT: 190 LBS

## 2024-01-16 DIAGNOSIS — E78.5 HYPERLIPIDEMIA, UNSPECIFIED HYPERLIPIDEMIA TYPE: ICD-10-CM

## 2024-01-16 DIAGNOSIS — I10 ESSENTIAL HYPERTENSION: ICD-10-CM

## 2024-01-16 DIAGNOSIS — E11.65 TYPE 2 DIABETES MELLITUS WITH HYPERGLYCEMIA, WITHOUT LONG-TERM CURRENT USE OF INSULIN: Primary | ICD-10-CM

## 2024-01-16 PROCEDURE — 99214 OFFICE O/P EST MOD 30 MIN: CPT | Performed by: FAMILY MEDICINE

## 2024-01-16 RX ORDER — ATORVASTATIN CALCIUM 10 MG/1
10 TABLET, FILM COATED ORAL DAILY
Qty: 90 TABLET | Refills: 3 | Status: SHIPPED | OUTPATIENT
Start: 2024-01-16 | End: 2024-01-28

## 2024-01-16 RX ORDER — LOSARTAN POTASSIUM 50 MG/1
50 TABLET ORAL DAILY
Qty: 90 TABLET | Refills: 3 | Status: SHIPPED | OUTPATIENT
Start: 2024-01-16

## 2024-01-16 RX ORDER — METFORMIN HYDROCHLORIDE 500 MG/1
1000 TABLET, EXTENDED RELEASE ORAL 2 TIMES DAILY
Qty: 360 TABLET | Refills: 3 | Status: SHIPPED | OUTPATIENT
Start: 2024-01-16

## 2024-01-16 RX ORDER — ICOSAPENT ETHYL 500 MG/1
4 CAPSULE ORAL 2 TIMES DAILY
Qty: 720 CAPSULE | Refills: 3 | Status: SHIPPED | OUTPATIENT
Start: 2024-01-16

## 2024-01-17 LAB
ALBUMIN SERPL-MCNC: 4.2 G/DL (ref 3.5–5.2)
ALBUMIN/CREAT UR: 6 MG/G CREAT (ref 0–29)
ALBUMIN/GLOB SERPL: 1.6 G/DL
ALP SERPL-CCNC: 87 U/L (ref 39–117)
ALT SERPL-CCNC: 29 U/L (ref 1–41)
AST SERPL-CCNC: 23 U/L (ref 1–40)
BASOPHILS # BLD AUTO: 0.03 10*3/MM3 (ref 0–0.2)
BASOPHILS NFR BLD AUTO: 0.7 % (ref 0–1.5)
BILIRUB SERPL-MCNC: 0.5 MG/DL (ref 0–1.2)
BUN SERPL-MCNC: 19 MG/DL (ref 8–23)
BUN/CREAT SERPL: 19.6 (ref 7–25)
CALCIUM SERPL-MCNC: 9.5 MG/DL (ref 8.6–10.5)
CHLORIDE SERPL-SCNC: 104 MMOL/L (ref 98–107)
CHOLEST SERPL-MCNC: 220 MG/DL (ref 0–200)
CO2 SERPL-SCNC: 23.8 MMOL/L (ref 22–29)
CREAT SERPL-MCNC: 0.97 MG/DL (ref 0.76–1.27)
CREAT UR-MCNC: 92.8 MG/DL
EGFRCR SERPLBLD CKD-EPI 2021: 88.3 ML/MIN/1.73
EOSINOPHIL # BLD AUTO: 0.08 10*3/MM3 (ref 0–0.4)
EOSINOPHIL NFR BLD AUTO: 2 % (ref 0.3–6.2)
ERYTHROCYTE [DISTWIDTH] IN BLOOD BY AUTOMATED COUNT: 13.3 % (ref 12.3–15.4)
GLOBULIN SER CALC-MCNC: 2.6 GM/DL
GLUCOSE SERPL-MCNC: 142 MG/DL (ref 65–99)
HBA1C MFR BLD: 8 % (ref 4.8–5.6)
HCT VFR BLD AUTO: 47.2 % (ref 37.5–51)
HDLC SERPL-MCNC: 29 MG/DL (ref 40–60)
HGB BLD-MCNC: 16.2 G/DL (ref 13–17.7)
IMM GRANULOCYTES # BLD AUTO: 0.02 10*3/MM3 (ref 0–0.05)
IMM GRANULOCYTES NFR BLD AUTO: 0.5 % (ref 0–0.5)
LDLC SERPL CALC-MCNC: 118 MG/DL (ref 0–100)
LDLC/HDLC SERPL: 3.74 {RATIO}
LYMPHOCYTES # BLD AUTO: 1.49 10*3/MM3 (ref 0.7–3.1)
LYMPHOCYTES NFR BLD AUTO: 36.4 % (ref 19.6–45.3)
MCH RBC QN AUTO: 30.5 PG (ref 26.6–33)
MCHC RBC AUTO-ENTMCNC: 34.3 G/DL (ref 31.5–35.7)
MCV RBC AUTO: 88.9 FL (ref 79–97)
MICROALBUMIN UR-MCNC: 5.5 UG/ML
MONOCYTES # BLD AUTO: 0.37 10*3/MM3 (ref 0.1–0.9)
MONOCYTES NFR BLD AUTO: 9 % (ref 5–12)
NEUTROPHILS # BLD AUTO: 2.1 10*3/MM3 (ref 1.7–7)
NEUTROPHILS NFR BLD AUTO: 51.4 % (ref 42.7–76)
NRBC BLD AUTO-RTO: 0 /100 WBC (ref 0–0.2)
PLATELET # BLD AUTO: 137 10*3/MM3 (ref 140–450)
POTASSIUM SERPL-SCNC: 4.9 MMOL/L (ref 3.5–5.2)
PROT SERPL-MCNC: 6.8 G/DL (ref 6–8.5)
RBC # BLD AUTO: 5.31 10*6/MM3 (ref 4.14–5.8)
SODIUM SERPL-SCNC: 141 MMOL/L (ref 136–145)
TRIGL SERPL-MCNC: 413 MG/DL (ref 0–150)
VLDLC SERPL CALC-MCNC: 73 MG/DL (ref 5–40)
WBC # BLD AUTO: 4.09 10*3/MM3 (ref 3.4–10.8)

## 2024-01-23 NOTE — PROGRESS NOTES
"Chief Complaint  Diabetes    Subjective        Zane Joshi presents to [facility]  Diabetes        Patient has a history having type 2 diabetes.  Currently Jardiance 25 mg daily and metformin 500 mg twice a day.  He denies any side effects of the medication.    He has a history of hyperlipidemia.  Currently on Lipitor 10 mg daily along with Vascepa 2 g twice a day.  Patient denies any side effects of the medication.    Patient also has a history having essential hypertension.  Currently getting losartan 50 mg daily.  Patient denies any side effects of the medicine.    Objective   Vital Signs:  /82 (BP Location: Left arm, Patient Position: Sitting, Cuff Size: Adult)   Pulse 82   Resp 14   Ht 162 cm (63.78\")   Wt 86.2 kg (190 lb)   SpO2 94%   BMI 32.84 kg/m²   Estimated body mass index is 32.84 kg/m² as calculated from the following:    Height as of this encounter: 162 cm (63.78\").    Weight as of this encounter: 86.2 kg (190 lb).             Physical Exam  Vitals and nursing note reviewed.   Constitutional:       Appearance: He is well-developed.   HENT:      Head: Normocephalic and atraumatic.   Musculoskeletal:      Cervical back: Normal range of motion and neck supple.   Neurological:      Mental Status: He is alert and oriented to person, place, and time.   Psychiatric:         Behavior: Behavior normal.        Result Review :                     Assessment and Plan     Diagnoses and all orders for this visit:    1. Type 2 diabetes mellitus with hyperglycemia, without long-term current use of insulin (Primary)  -     empagliflozin (Jardiance) 25 MG tablet tablet; Take 1 tablet by mouth Daily.  Dispense: 90 tablet; Refill: 3  -     metFORMIN ER (GLUCOPHAGE-XR) 500 MG 24 hr tablet; Take 2 tablets by mouth 2 (Two) Times a Day.  Dispense: 360 tablet; Refill: 3  -     Microalbumin / Creatinine Urine Ratio - Urine, Clean Catch  -     Hemoglobin A1c    2. Hyperlipidemia, " unspecified hyperlipidemia type  -     atorvastatin (Lipitor) 10 MG tablet; Take 1 tablet by mouth Daily.  Dispense: 90 tablet; Refill: 3  -     Lipid Panel With LDL / HDL Ratio    3. Essential hypertension  -     losartan (Cozaar) 50 MG tablet; Take 1 tablet by mouth Daily.  Dispense: 90 tablet; Refill: 3  -     Comprehensive Metabolic Panel  -     CBC & Differential    Other orders  -     Icosapent Ethyl (Vascepa) 0.5 g capsule; Take 4 capsules by mouth 2 (Two) Times a Day.  Dispense: 720 capsule; Refill: 3    For essential hypertension, continue losartan.  Further hyperlipidemia, continue taking Lipitor as well as Vascepa.  For type 2 diabetes, continue metformin and Jardiance.         Follow Up     No follow-ups on file.  Patient was given instructions and counseling regarding his condition or for health maintenance advice. Please see specific information pulled into the AVS if appropriate.

## 2024-01-28 RX ORDER — ATORVASTATIN CALCIUM 20 MG/1
20 TABLET, FILM COATED ORAL DAILY
Qty: 90 TABLET | Refills: 3 | Status: SHIPPED | OUTPATIENT
Start: 2024-01-28

## 2024-01-28 NOTE — PROGRESS NOTES
Please inform the patient of the following abnormal results. Hba1c continues to stay elevated. Will start patient on januvia, in addition to metformin and jardiance. Will also like to increase the atorvastatin to 20mg daily.

## 2024-04-05 NOTE — PROGRESS NOTES
Please inform the patient of the following abnormal results. I have started him on medication in office and he needs to see me back in 2 wks for additional medication.  Received fax from [employer]. CMN for pump replacement.     LOV 4-2-2024  NOV 7-    Please review and sign

## 2024-04-09 ENCOUNTER — OFFICE VISIT (OUTPATIENT)
Dept: INTERNAL MEDICINE | Facility: CLINIC | Age: 64
End: 2024-04-09
Payer: COMMERCIAL

## 2024-04-09 VITALS
HEIGHT: 64 IN | BODY MASS INDEX: 32.44 KG/M2 | TEMPERATURE: 97.6 F | OXYGEN SATURATION: 97 % | SYSTOLIC BLOOD PRESSURE: 122 MMHG | DIASTOLIC BLOOD PRESSURE: 82 MMHG | WEIGHT: 190 LBS | RESPIRATION RATE: 18 BRPM | HEART RATE: 68 BPM

## 2024-04-09 DIAGNOSIS — E78.5 HYPERLIPIDEMIA, UNSPECIFIED HYPERLIPIDEMIA TYPE: ICD-10-CM

## 2024-04-09 DIAGNOSIS — I10 ESSENTIAL HYPERTENSION: ICD-10-CM

## 2024-04-09 DIAGNOSIS — E11.65 TYPE 2 DIABETES MELLITUS WITH HYPERGLYCEMIA, WITHOUT LONG-TERM CURRENT USE OF INSULIN: Primary | ICD-10-CM

## 2024-04-09 PROCEDURE — 99214 OFFICE O/P EST MOD 30 MIN: CPT | Performed by: FAMILY MEDICINE

## 2024-04-09 RX ORDER — LOSARTAN POTASSIUM 50 MG/1
50 TABLET ORAL DAILY
Qty: 90 TABLET | Refills: 3 | Status: SHIPPED | OUTPATIENT
Start: 2024-04-09

## 2024-04-09 RX ORDER — ATORVASTATIN CALCIUM 20 MG/1
20 TABLET, FILM COATED ORAL DAILY
Qty: 90 TABLET | Refills: 3 | Status: SHIPPED | OUTPATIENT
Start: 2024-04-09

## 2024-04-09 RX ORDER — METFORMIN HYDROCHLORIDE 500 MG/1
1000 TABLET, EXTENDED RELEASE ORAL 2 TIMES DAILY
Qty: 360 TABLET | Refills: 3 | Status: SHIPPED | OUTPATIENT
Start: 2024-04-09

## 2024-04-09 RX ORDER — ICOSAPENT ETHYL 500 MG/1
4 CAPSULE ORAL 2 TIMES DAILY
Qty: 720 CAPSULE | Refills: 3 | Status: SHIPPED | OUTPATIENT
Start: 2024-04-09

## 2024-04-09 NOTE — PROGRESS NOTES
"Chief Complaint  Diabetes (Pt is fasting) and Annual Exam    Subjective          Zane Joshi presents to [facility]  History of Present Illness  The patient is a 63-year-old male who is following up on his diabetes. He is accompanied by his wife.    The patient reports no adverse effects from his current medication regimen, however, he does not monitor his blood glucose levels at home. His current medication regimen includes Januvia 100 mg daily, metformin 500 mg (2 tablets each morning), and Jardiance 25 mg.    Supplemental Information  He is taking losartan 50 mg daily for his blood pressure. For his cholesterol, he is taking atorvastatin 20 mg daily and Vascepa for his triglycerides.    Objective   Vital Signs:   /82   Pulse 68   Temp 97.6 °F (36.4 °C)   Resp 18   Ht 162 cm (63.78\")   Wt 86.2 kg (190 lb)   SpO2 97%   BMI 32.84 kg/m²     Physical Exam  Vitals and nursing note reviewed.   Constitutional:       Appearance: He is well-developed.   HENT:      Head: Normocephalic and atraumatic.   Musculoskeletal:      Cervical back: Normal range of motion and neck supple.   Neurological:      Mental Status: He is alert and oriented to person, place, and time.   Psychiatric:         Behavior: Behavior normal.         Physical Exam       Result Review :                 Assessment and Plan    Diagnoses and all orders for this visit:    1. Type 2 diabetes mellitus with hyperglycemia, without long-term current use of insulin (Primary)  -     empagliflozin (Jardiance) 25 MG tablet tablet; Take 1 tablet by mouth Daily.  Dispense: 90 tablet; Refill: 3  -     metFORMIN ER (GLUCOPHAGE-XR) 500 MG 24 hr tablet; Take 2 tablets by mouth 2 (Two) Times a Day.  Dispense: 360 tablet; Refill: 3  -     SITagliptin (Januvia) 100 MG tablet; Take 1 tablet by mouth Daily.  Dispense: 90 tablet; Refill: 1  -     Hemoglobin A1c  -     Microalbumin / Creatinine Urine Ratio - Urine, Clean " Catch  -     Continue Januvia, Jardiance, and metformin.    2. Hyperlipidemia, unspecified hyperlipidemia type  -     atorvastatin (Lipitor) 20 MG tablet; Take 1 tablet by mouth Daily.  Dispense: 90 tablet; Refill: 3  -     Icosapent Ethyl (Vascepa) 0.5 g capsule; Take 4 capsules by mouth 2 (Two) Times a Day.  Dispense: 720 capsule; Refill: 3  -     Comprehensive Metabolic Panel  -     Lipid Panel With LDL / HDL Ratio  -     Continue Vascepa and Lipitor.    3. Essential hypertension  -     losartan (Cozaar) 50 MG tablet; Take 1 tablet by mouth Daily.  Dispense: 90 tablet; Refill: 3  -     CBC & Differential  -     Comprehensive Metabolic Panel  -     Continue losartan.      Assessment & Plan        Follow Up   No follow-ups on file.  Patient was given instructions and counseling regarding his condition or for health maintenance advice. Please see specific information pulled into the AVS if appropriate.           Patient or patient representative verbalized consent for the use of Ambient Listening during the visit with  Mat Singh MD for chart documentation. 4/9/2024  09:17 EDT

## 2024-05-08 ENCOUNTER — TELEPHONE (OUTPATIENT)
Dept: INTERNAL MEDICINE | Facility: CLINIC | Age: 64
End: 2024-05-08

## 2024-05-08 NOTE — TELEPHONE ENCOUNTER
"Relay     \"Pt has not been seen in over 1 year.  He will need to have an office visit to discuss with Dr. Singh.\"                 "

## 2024-05-08 NOTE — TELEPHONE ENCOUNTER
Caller: Joanna Hurley    Relationship: Emergency Contact    Best call back number: [phone]     What is the medical concern/diagnosis: PREVIOUS HEART ATTACH    What specialty or service is being requested: CARDIOLOGY    Any additional details: PATIENT'S WIFE IS CALLING TO STATE PATIENT HAS HAD A CARDIOLOGIST IN THE PAST THAT HE DID NOT GET ALONG WITH.   HE IS WANTING TO KNOW IF DR QUEEN WOULD REFER HIM TO A CARDIOLOGIST THAT WOULD BE A GOOD FIT FOR PATIENT.    PLEASE.

## 2024-05-15 ENCOUNTER — TELEPHONE (OUTPATIENT)
Dept: INTERNAL MEDICINE | Facility: CLINIC | Age: 64
End: 2024-05-15
Payer: COMMERCIAL

## 2024-05-15 DIAGNOSIS — I21.4 NSTEMI (NON-ST ELEVATED MYOCARDIAL INFARCTION): Primary | ICD-10-CM

## 2024-05-15 NOTE — TELEPHONE ENCOUNTER
Hub staff attempted to follow warm transfer process and was unsuccessful     Caller: Joanna Hurley    Relationship to patient: Emergency Contact    Best call back number: [phone]    Patient is needing: PT WAS SENT TO CARDIOLOGY HUB FROM [facility]IMARY [facility]UB. PT WAS REQUESTING A CARDIOLOGY REFERRAL FROM DR. QUEEN AND WAS CHECKING ON A STATUS UPDATE ON THAT. PLEASE REACH OUT TO PT TO DISCUSS RECOMMENDATIONS PER DR. CROCKETT REQUEST.

## 2024-07-09 ENCOUNTER — LAB (OUTPATIENT)
Dept: LAB | Facility: HOSPITAL | Age: 64
End: 2024-07-09
Payer: COMMERCIAL

## 2024-07-09 ENCOUNTER — OFFICE VISIT (OUTPATIENT)
Dept: INTERNAL MEDICINE | Facility: CLINIC | Age: 64
End: 2024-07-09
Payer: COMMERCIAL

## 2024-07-09 VITALS
BODY MASS INDEX: 33.66 KG/M2 | RESPIRATION RATE: 14 BRPM | HEIGHT: 63 IN | SYSTOLIC BLOOD PRESSURE: 124 MMHG | HEART RATE: 67 BPM | OXYGEN SATURATION: 96 % | WEIGHT: 190 LBS | DIASTOLIC BLOOD PRESSURE: 76 MMHG

## 2024-07-09 DIAGNOSIS — E78.5 HYPERLIPIDEMIA, UNSPECIFIED HYPERLIPIDEMIA TYPE: ICD-10-CM

## 2024-07-09 DIAGNOSIS — M54.31 SCIATICA, RIGHT SIDE: ICD-10-CM

## 2024-07-09 DIAGNOSIS — E11.65 TYPE 2 DIABETES MELLITUS WITH HYPERGLYCEMIA, WITHOUT LONG-TERM CURRENT USE OF INSULIN: Primary | ICD-10-CM

## 2024-07-09 DIAGNOSIS — I10 ESSENTIAL HYPERTENSION: ICD-10-CM

## 2024-07-09 LAB
ALBUMIN SERPL-MCNC: 4.3 G/DL (ref 3.5–5.2)
ALBUMIN UR-MCNC: 1.3 MG/DL
ALBUMIN/GLOB SERPL: 1.6 G/DL
ALP SERPL-CCNC: 81 U/L (ref 39–117)
ALT SERPL W P-5'-P-CCNC: 35 U/L (ref 1–41)
ANION GAP SERPL CALCULATED.3IONS-SCNC: 10 MMOL/L (ref 5–15)
AST SERPL-CCNC: 23 U/L (ref 1–40)
BASOPHILS # BLD AUTO: 0.03 10*3/MM3 (ref 0–0.2)
BASOPHILS NFR BLD AUTO: 0.7 % (ref 0–1.5)
BILIRUB SERPL-MCNC: 0.6 MG/DL (ref 0–1.2)
BUN SERPL-MCNC: 14 MG/DL (ref 8–23)
BUN/CREAT SERPL: 13.6 (ref 7–25)
CALCIUM SPEC-SCNC: 9.1 MG/DL (ref 8.6–10.5)
CHLORIDE SERPL-SCNC: 107 MMOL/L (ref 98–107)
CHOLEST SERPL-MCNC: 162 MG/DL (ref 0–200)
CO2 SERPL-SCNC: 23 MMOL/L (ref 22–29)
CREAT SERPL-MCNC: 1.03 MG/DL (ref 0.76–1.27)
CREAT UR-MCNC: 84.7 MG/DL
DEPRECATED RDW RBC AUTO: 41.8 FL (ref 37–54)
EGFRCR SERPLBLD CKD-EPI 2021: 81.6 ML/MIN/1.73
EOSINOPHIL # BLD AUTO: 0.09 10*3/MM3 (ref 0–0.4)
EOSINOPHIL NFR BLD AUTO: 2.2 % (ref 0.3–6.2)
ERYTHROCYTE [DISTWIDTH] IN BLOOD BY AUTOMATED COUNT: 12.9 % (ref 12.3–15.4)
GLOBULIN UR ELPH-MCNC: 2.7 GM/DL
GLUCOSE SERPL-MCNC: 155 MG/DL (ref 65–99)
HBA1C MFR BLD: 7.3 % (ref 4.8–5.6)
HCT VFR BLD AUTO: 47.4 % (ref 37.5–51)
HDLC SERPL-MCNC: 30 MG/DL (ref 40–60)
HGB BLD-MCNC: 15.9 G/DL (ref 13–17.7)
IMM GRANULOCYTES # BLD AUTO: 0.03 10*3/MM3 (ref 0–0.05)
IMM GRANULOCYTES NFR BLD AUTO: 0.7 % (ref 0–0.5)
LDLC SERPL CALC-MCNC: 91 MG/DL (ref 0–100)
LDLC/HDLC SERPL: 2.78 {RATIO}
LYMPHOCYTES # BLD AUTO: 1.48 10*3/MM3 (ref 0.7–3.1)
LYMPHOCYTES NFR BLD AUTO: 36.1 % (ref 19.6–45.3)
MCH RBC QN AUTO: 30 PG (ref 26.6–33)
MCHC RBC AUTO-ENTMCNC: 33.5 G/DL (ref 31.5–35.7)
MCV RBC AUTO: 89.4 FL (ref 79–97)
MICROALBUMIN/CREAT UR: 15.3 MG/G (ref 0–29)
MONOCYTES # BLD AUTO: 0.36 10*3/MM3 (ref 0.1–0.9)
MONOCYTES NFR BLD AUTO: 8.8 % (ref 5–12)
NEUTROPHILS NFR BLD AUTO: 2.11 10*3/MM3 (ref 1.7–7)
NEUTROPHILS NFR BLD AUTO: 51.5 % (ref 42.7–76)
NRBC BLD AUTO-RTO: 0 /100 WBC (ref 0–0.2)
PLATELET # BLD AUTO: 130 10*3/MM3 (ref 140–450)
PMV BLD AUTO: 10.6 FL (ref 6–12)
POTASSIUM SERPL-SCNC: 4.2 MMOL/L (ref 3.5–5.2)
PROT SERPL-MCNC: 7 G/DL (ref 6–8.5)
RBC # BLD AUTO: 5.3 10*6/MM3 (ref 4.14–5.8)
SODIUM SERPL-SCNC: 140 MMOL/L (ref 136–145)
TRIGL SERPL-MCNC: 243 MG/DL (ref 0–150)
VLDLC SERPL-MCNC: 41 MG/DL (ref 5–40)
WBC NRBC COR # BLD AUTO: 4.1 10*3/MM3 (ref 3.4–10.8)

## 2024-07-09 PROCEDURE — 83036 HEMOGLOBIN GLYCOSYLATED A1C: CPT | Performed by: FAMILY MEDICINE

## 2024-07-09 PROCEDURE — 99214 OFFICE O/P EST MOD 30 MIN: CPT | Performed by: FAMILY MEDICINE

## 2024-07-09 PROCEDURE — 80061 LIPID PANEL: CPT | Performed by: FAMILY MEDICINE

## 2024-07-09 PROCEDURE — 85025 COMPLETE CBC W/AUTO DIFF WBC: CPT | Performed by: FAMILY MEDICINE

## 2024-07-09 PROCEDURE — 80053 COMPREHEN METABOLIC PANEL: CPT | Performed by: FAMILY MEDICINE

## 2024-07-09 PROCEDURE — 82570 ASSAY OF URINE CREATININE: CPT | Performed by: FAMILY MEDICINE

## 2024-07-09 PROCEDURE — 82043 UR ALBUMIN QUANTITATIVE: CPT | Performed by: FAMILY MEDICINE

## 2024-07-09 PROCEDURE — 36415 COLL VENOUS BLD VENIPUNCTURE: CPT | Performed by: FAMILY MEDICINE

## 2024-07-09 RX ORDER — ATORVASTATIN CALCIUM 20 MG/1
20 TABLET, FILM COATED ORAL DAILY
Qty: 90 TABLET | Refills: 3 | Status: SHIPPED | OUTPATIENT
Start: 2024-07-09

## 2024-07-09 RX ORDER — METFORMIN HYDROCHLORIDE 500 MG/1
1000 TABLET, EXTENDED RELEASE ORAL 2 TIMES DAILY
Qty: 360 TABLET | Refills: 3 | Status: SHIPPED | OUTPATIENT
Start: 2024-07-09

## 2024-07-09 RX ORDER — LOSARTAN POTASSIUM 50 MG/1
50 TABLET ORAL DAILY
Qty: 90 TABLET | Refills: 3 | Status: SHIPPED | OUTPATIENT
Start: 2024-07-09

## 2024-07-09 NOTE — PROGRESS NOTES
"Chief Complaint  Diabetes    Subjective          Zane Joshi presents to [facility]  History of Present Illness  The patient is a 63-year-old male who presents for evaluation of multiple medical concerns. He is accompanied by his wife.    The patient reports a general sense of well-being. His current medication regimen includes Januvia 100 mg daily, metformin 1000 mg twice daily, and Jardiance 25 mg daily. His last ophthalmological examination was conducted in the previous year. He denies any peripheral neuropathy symptoms such as numbness or tingling in his feet.    The patient is currently on Lipitor 20 mg daily for cholesterol management.    The patient is also on losartan for hypertension management. His wife reports that she attempted to schedule an appointment with a cardiologist in 05/2023, however, it took approximately 1.5 weeks to secure an appointment.    The patient is also on Cialis as needed. He reports no adverse effects from these medications.    Supplemental Information  His right nostril bleeds more than he can blow his nose.    Objective   Vital Signs:   /76 (BP Location: Left arm, Patient Position: Sitting, Cuff Size: Adult)   Pulse 67   Resp 14   Ht 160 cm (63\")   Wt 86.2 kg (190 lb)   SpO2 96%   BMI 33.66 kg/m²     Physical Exam  Vitals and nursing note reviewed.   Constitutional:       Appearance: He is well-developed.   HENT:      Head: Normocephalic and atraumatic.   Musculoskeletal:      Cervical back: Normal range of motion and neck supple.   Neurological:      Mental Status: He is alert and oriented to person, place, and time.   Psychiatric:         Behavior: Behavior normal.         Physical Exam       Result Review :                 Assessment and Plan    Diagnoses and all orders for this visit:    1. Type 2 diabetes mellitus with hyperglycemia, without long-term current use of insulin (Primary)  -     SITagliptin (Januvia) 100 MG " tablet; Take 1 tablet by mouth Daily.  Dispense: 90 tablet; Refill: 1  -     metFORMIN ER (GLUCOPHAGE-XR) 500 MG 24 hr tablet; Take 2 tablets by mouth 2 (Two) Times a Day.  Dispense: 360 tablet; Refill: 3  -     empagliflozin (Jardiance) 25 MG tablet tablet; Take 1 tablet by mouth Daily.  Dispense: 90 tablet; Refill: 3  -     Hemoglobin A1c  -     Microalbumin / Creatinine Urine Ratio - Urine, Clean Catch    2. Hyperlipidemia, unspecified hyperlipidemia type  -     atorvastatin (Lipitor) 20 MG tablet; Take 1 tablet by mouth nightly  Dispense: 90 tablet; Refill: 3  -     Comprehensive Metabolic Panel  -     Lipid Panel With LDL / HDL Ratio    3. Essential hypertension  -     losartan (Cozaar) 50 MG tablet; Take 1 tablet by mouth Daily.  Dispense: 90 tablet; Refill: 3  -     Comprehensive Metabolic Panel  -     CBC & Differential    4. Sciatica, right side  -     Ambulatory Referral to Physical Therapy      Assessment & Plan  1. Diabetes.  Blood work and a urine test have been ordered.  Patient will continue with Jardiance 25 mg daily along with Januvia 100 mg daily and metformin 1000 mg daily.    2. Right-sided sciatica.  Physical therapy has been recommended.    3. Hyperlipidemia.  Will continue patient on Lipitor 20 mg daily.    Follow Up   No follow-ups on file.  Patient was given instructions and counseling regarding his condition or for health maintenance advice. Please see specific information pulled into the AVS if appropriate.           Patient or patient representative verbalized consent for the use of Ambient Listening during the visit with  Mat Singh MD for chart documentation. 7/9/2024  11:51 EDT

## 2024-07-11 ENCOUNTER — PATIENT ROUNDING (BHMG ONLY) (OUTPATIENT)
Dept: INTERNAL MEDICINE | Facility: CLINIC | Age: 64
End: 2024-07-11
Payer: COMMERCIAL

## 2024-07-11 NOTE — PROGRESS NOTES
July 11, 2024    Hello, may I speak with Zane Joshi?    My name is ryland      I am  with [facility]  [address]  [place] [address] [phone].    Before we get started may I verify your date of birth? 1960    I am calling to officially welcome you to our practice and ask about your recent visit. Is this a good time to talk? yes    Tell me about your visit with us. What things went well?  yes       We're always looking for ways to make our patients' experiences even better. Do you have recommendations on ways we may improve?  no    Overall were you satisfied with your first visit to our practice? yes       I appreciate you taking the time to speak with me today. Is there anything else I can do for you? no      Thank you, and have a great day.

## 2024-07-15 DIAGNOSIS — D69.6 LOW PLATELET COUNT: Primary | ICD-10-CM

## 2024-07-16 ENCOUNTER — OFFICE VISIT (OUTPATIENT)
Dept: CARDIOLOGY | Facility: CLINIC | Age: 64
End: 2024-07-16
Payer: COMMERCIAL

## 2024-07-16 VITALS
DIASTOLIC BLOOD PRESSURE: 84 MMHG | SYSTOLIC BLOOD PRESSURE: 136 MMHG | HEART RATE: 68 BPM | HEIGHT: 63 IN | BODY MASS INDEX: 33.84 KG/M2 | WEIGHT: 191 LBS

## 2024-07-16 DIAGNOSIS — I25.2 OLD MI (MYOCARDIAL INFARCTION): Primary | ICD-10-CM

## 2024-07-16 RX ORDER — AMLODIPINE BESYLATE 5 MG/1
5 TABLET ORAL DAILY
Qty: 30 TABLET | Refills: 11 | Status: SHIPPED | OUTPATIENT
Start: 2024-07-16

## 2024-07-16 NOTE — PROGRESS NOTES
"      CARDIOLOGY    Ej Ta MD    ENCOUNTER DATE:  07/16/2024    Zane Joshi / 64 y.o. / male        CHIEF COMPLAINT / REASON FOR OFFICE VISIT     Coronary Artery Disease  Old MI    HISTORY OF PRESENT ILLNESS       Coronary Artery Disease    Zane Joshi is a 64 y.o. male who presents today for consultation.  Patient has a history of myocardial infarction.  Patient actually presented about 2 days after his MI.  He was having indigestion.  He ultimately was found to have a  of his right coronary artery with collateralization so he was treated medically.  Patient now presents because for about the last 4 to 6 months he has noticed some heartburn again.  He says it mostly occurs at nighttime when he sits down and is extremely variable in frequency.  He says however this is unusual so he was referred for further evaluation.      The following portions of the patient's history were reviewed and updated as appropriate: allergies, current medications, past family history, past medical history, past social history, past surgical history and problem list.      VITAL SIGNS     Visit Vitals  /84 (BP Location: Left arm)   Pulse 68   Ht 160 cm (63\")   Wt 86.6 kg (191 lb)   BMI 33.83 kg/m²         Wt Readings from Last 3 Encounters:   07/16/24 86.6 kg (191 lb)   07/09/24 86.2 kg (190 lb)   04/09/24 86.2 kg (190 lb)     Body mass index is 33.83 kg/m².      REVIEW OF SYSTEMS   ROS        PHYSICAL EXAMINATION     Vitals reviewed.   Constitutional:       Appearance: Healthy appearance.   Pulmonary:      Effort: Pulmonary effort is normal.   Cardiovascular:      Normal rate. Regular rhythm. Normal S1. Normal S2.       Murmurs: There is no murmur.      No gallop.  No click. No rub.   Pulses:     Intact distal pulses.   Edema:     Peripheral edema absent.   Neurological:      Mental Status: Alert.         REVIEWED DATA       ECG 12 Lead    Date/Time: 7/16/2024 11:43 AM  Performed by: Ej Ta, " MD    Authorized by: Ej Ta MD  Comparison: compared with previous ECG from 7/7/2022  Similar to previous ECG  Rhythm: sinus rhythm  Conduction: right bundle branch block    Clinical impression: abnormal EKG        Cardiac Procedures:      Lipid Panel          10/10/2023    08:12 1/16/2024    08:11 7/9/2024    08:15   Lipid Panel   Total Cholesterol 178   162    Total Cholesterol  220     Triglycerides 300  413  243    HDL Cholesterol 31  29  30    VLDL Cholesterol 51  73  41    LDL Cholesterol  96  118  91    LDL/HDL Ratio 2.81  3.74  2.78      3. CORONARY ANGIOGRAPHY:   Left main: Large caliber left main coronary artery which bifurcates to the LAD and circumflex arteries.  The left main is normal.  LAD: Medium caliber circumflex which is mild proximal irregularities but is otherwise normal.  There are number of small caliber diagonals which supply the lateral wall.  The distal LAD supplies left to right collaterals to the right system.  Left circumflex: The circumflex is a small caliber vessel.  Its normal in the proximal segment.  It gives off a medium caliber, branching first OM with mild irregularities in the mid segment.  The mid circumflex has a 50 to 60% stenosis just after the first OM takeoff, and gives off a small caliber second OM.  The ongoing AV groove circumflex provides collaterals to the right coronary.  RCA: Medium caliber, dominant right coronary artery.  It is occluded in the midsegment.  There are bridging collaterals from the atrial and RV branches which backfill the distal RCA.  There are left to right collaterals which backfills the PDA and PL branches.       ASSESSMENT & PLAN      Diagnosis Plan   1. Old MI (myocardial infarction)              SUMMARY/DISCUSSION  Previous myocardial infarction.  Coronary artery disease patient has a  of the right coronary artery.  Patient also had a 50% stenosis of the circumflex just after the OM1.  He is not having indigestion which is  concerning because this was his presenting symptoms before he had his heart attack.  However with his history his  it could be very difficult to assess his perfusion status by stress test.  In light of that I would take a little different approach on replacement and aspirin which she should be on anyway I am also going to place him on a second antianginal in the form of amlodipine 5 mg a day.  Told to take it for few weeks if he notices a difference if he does not then take an over-the-counter proton pump inhibitor.  Reassess in 6 weeks and go from there.  I think if he continues to have problems we will need to do a heart catheterization.        MEDICATIONS         Discharge Medications            Accurate as of July 16, 2024 11:33 AM. If you have any questions, ask your nurse or doctor.                Continue These Medications        Instructions Start Date   atorvastatin 20 MG tablet  Commonly known as: Lipitor   Take 1 tablet by mouth nightly      empagliflozin 25 MG tablet tablet  Commonly known as: Jardiance   25 mg, Oral, Daily      Loratadine 10 MG capsule   10 mg, Oral, Daily PRN      losartan 50 MG tablet  Commonly known as: Cozaar   50 mg, Oral, Daily      metFORMIN  MG 24 hr tablet  Commonly known as: GLUCOPHAGE-XR   1,000 mg, Oral, 2 Times Daily      SITagliptin 100 MG tablet  Commonly known as: Januvia   100 mg, Oral, Daily      tadalafil 20 MG tablet  Commonly known as: Cialis   20 mg, Oral, Daily PRN      Vascepa 0.5 g capsule  Generic drug: Icosapent Ethyl   2,000 mg, Oral, 2 Times Daily                   **Temion Disclaimer:   Much of this encounter note is an electronic transcription/translation of spoken language to printed text. The electronic translation of spoken language may permit erroneous, or at times, nonsensical words or phrases to be inadvertently transcribed. Although I have reviewed the note for such errors, some may still exist.

## 2024-07-16 NOTE — PROGRESS NOTES
Please inform the patient of the following abnormal results. Triglycerides are starting to come down, will continue to monitor, needs to diet and exercise more. Hba1c is coming down, continue current medication regimen. Platelets continue to run low, I would like patient to see hematology.

## 2024-07-18 ENCOUNTER — TELEPHONE (OUTPATIENT)
Dept: INTERNAL MEDICINE | Facility: CLINIC | Age: 64
End: 2024-07-18

## 2024-07-18 NOTE — TELEPHONE ENCOUNTER
Hub staff attempted to follow warm transfer process and was unsuccessful     Caller: Zane Joshi    Relationship to patient: Self    Best call back number: [phone]    Patient is needing: PATIENT CALLED STATING HE IS RETURNING A CALL.    UNABLE TO WARM TRANSFER.    PLEASE ADVISE.

## 2024-07-23 ENCOUNTER — TREATMENT (OUTPATIENT)
Dept: PHYSICAL THERAPY | Facility: CLINIC | Age: 64
End: 2024-07-23
Payer: COMMERCIAL

## 2024-07-23 DIAGNOSIS — M54.31 SCIATICA, RIGHT SIDE: Primary | ICD-10-CM

## 2024-07-23 PROCEDURE — 97110 THERAPEUTIC EXERCISES: CPT | Performed by: PHYSICAL THERAPIST

## 2024-07-23 PROCEDURE — 97161 PT EVAL LOW COMPLEX 20 MIN: CPT | Performed by: PHYSICAL THERAPIST

## 2024-07-23 PROCEDURE — 97530 THERAPEUTIC ACTIVITIES: CPT | Performed by: PHYSICAL THERAPIST

## 2024-07-23 NOTE — PROGRESS NOTES
"    Physical Therapy Initial Evaluation and Plan of Care    Patient: Zane Joshi   : 1960  Diagnosis/ICD-10 Code:  Sciatica, right side [M54.31]  Referring practitioner: Mat Singh MD  Date of Initial Visit: 2024  Today's Date: 2024  Patient seen for 1 session         Visit Diagnoses:    ICD-10-CM ICD-9-CM   1. Sciatica, right side  M54.31 724.3         Subjective Questionnaire: Oswestry: 28% limitation      Subjective Evaluation    History of Present Illness  Mechanism of injury: Pain in RLE that began years ago. Unsure if he has numbness or tingling. Pain level varies. Denies trauma, states \"I pulled a sciatic nerve\". States he was referred to PT by MD, no worsening lately. Lying in the floor with feet in chair helps. Unsure if pain goes down the back or side of the leg. Pt states he gets massages occasionally, which helps. When asked if he has tried any exercise, he said \"I work for exercise\". States calf muscles feel tight. Pt states he does his daily routine normally, \"I don't let pain stop me\".      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 Quality of life: good    Pain  Current pain ratin  Location: RLE  Alleviating factors: \"I don't know\"  Progression: no change    Social Support  Lives in: multiple-level home  Lives with: spouse    Diagnostic Tests  No diagnostic tests performed           Objective          Neurological Testing     Sensation     Lumbar   Left   Intact: light touch    Right   Intact: light touch    Active Range of Motion     Additional Active Range of Motion Details  Lumbar ROM 75% WNL grossly    Strength/Myotome Testing     Left Hip   Planes of Motion   Flexion: 4+  Extension: 4+  Abduction: 4+  Adduction: 4+    Right Hip   Planes of Motion   Flexion: 4+  Extension: 4+  Abduction: 4+  Adduction: 4+    Left Knee   Flexion: 5  Extension: 5    Right Knee   Flexion: 5  Extension: 5    Left Ankle/Foot   Dorsiflexion: 5  Plantar flexion: 5  Great toe extension: " 5    Right Ankle/Foot   Dorsiflexion: 5  Plantar flexion: 5  Great toe extension: 5    Tests       Thoracic   Negative slump.     Lumbar     Left   Negative passive SLR and quadrant.     Right   Negative passive SLR and quadrant.     Left Hip   Positive Arturo and piriformis.   Negative scour.   Buddy: Positive.   90/90 SLR: Positive.     Right Hip   Positive Arturo and piriformis.   Negative scour.   Buddy: Positive.   90/90 SLR: Positive.           Assessment & Plan       Assessment  Impairments: activity intolerance, impaired physical strength, lacks appropriate home exercise program and pain with function   Functional limitations: lifting, sleeping, walking, uncomfortable because of pain, moving in bed and sitting   Assessment details: Pt presents with RLE pain and decreased core strength and LE flexibility. He is a good candidate for formal physical therapy, but he does not want to schedule additional visits at this time. Instructed in HEP, written copy provided.     Prognosis: good    Goals  Plan Goals: Short Term Goals: 6 weeks. Patient will:  1. Be independent with initial HEP (MET)    Will set additional goals if pt decides to schedule additional PT visits.     Plan  Therapy options: will be seen for skilled therapy services  Planned modality interventions: cryotherapy, thermotherapy (hydrocollator packs), TENS, ultrasound and traction  Planned therapy interventions: abdominal trunk stabilization, manual therapy, neuromuscular re-education, body mechanics training, postural training, soft tissue mobilization, spinal/joint mobilization, strengthening, stretching, home exercise program, functional ROM exercises and flexibility  Frequency: 2x week  Duration in weeks: 12  Treatment plan discussed with: patient      History # of Personal Factors and/or Comorbidities: MODERATE (1-2)  Examination of Body System(s): # of elements: LOW (1-2)  Clinical Presentation: STABLE   Clinical Decision Making: LOW       Timed:          Manual Therapy:    0     mins  32025;     Therapeutic Exercise:    10     mins  01300;     Neuromuscular Jesusita:    0    mins  20250;    Therapeutic Activity:     10     mins  33898;     Gait Trainin     mins  76165;     Ultrasound:     0     mins  61332;    Ionto                               0    mins   10731  Self Care                       0     mins   51343      Un-Timed:  Electrical Stimulation:    0     mins  43207 ( );  Dry Needling     0     mins self-pay  Traction     0     mins 44979  Low Eval     20     Mins  31886  Mod Eval     0     Mins  46359  High Eval                       0     Mins  85752  Canalith Repos    0     mins 74330      Timed Treatment:   20   mins   Total Treatment:     40   mins          PT: Keshia Crooks, PT     License Number: [id number]  Electronically signed by Keshia Crooks PT, 24, 3:32 PM EDT    Certification Period: 2024 thru 10/20/2024  I certify that the therapy services are furnished while this patient is under my care.  The services outlined above are required by this patient, and will be reviewed every 90 days.         Physician Signature:__________________________________________________    PHYSICIAN: Mat Singh MD  NPI: [id number]                                      DATE:      Please sign and return via fax to [email or web address] . Thank you, [facility].

## 2024-08-12 NOTE — PATIENT INSTRUCTIONS
Access Code: [email or web address]  URL: [email or web address]  Date: 08/12/2024  Prepared by: Keshia Crooks    Exercises  - Supine Hamstring Stretch with Strap  - 1 x daily - 3 reps - 20 sec hold  - Standing Bilateral Gastroc Stretch with Step  - 1 x daily - 3 reps - 20 hold  - Modified Buddy Stretch  - 1 x daily - 3 reps - 20 hold  - Supine Single Knee to Chest  - 1 x daily - 3 reps - 20 hold

## 2024-08-27 ENCOUNTER — OFFICE VISIT (OUTPATIENT)
Dept: CARDIOLOGY | Facility: CLINIC | Age: 64
End: 2024-08-27
Payer: COMMERCIAL

## 2024-08-27 VITALS
HEART RATE: 88 BPM | WEIGHT: 188 LBS | DIASTOLIC BLOOD PRESSURE: 90 MMHG | OXYGEN SATURATION: 97 % | HEIGHT: 63 IN | SYSTOLIC BLOOD PRESSURE: 158 MMHG | BODY MASS INDEX: 33.31 KG/M2

## 2024-08-27 DIAGNOSIS — I25.2 OLD MI (MYOCARDIAL INFARCTION): Primary | ICD-10-CM

## 2024-08-29 NOTE — PROGRESS NOTES
"      CARDIOLOGY    Ej Ta MD    ENCOUNTER DATE:  08/27/2024    Zane Joshi / 64 y.o. / male        CHIEF COMPLAINT / REASON FOR OFFICE VISIT     Old MI (myocardial infarction) (07/16/2024 Follow up)      HISTORY OF PRESENT ILLNESS       HPI  Zane Joshi is a 64 y.o. male who presents today for reevaluation.  Clinically patient is doing well no chest pain shortness breath palpitations lightheadedness swelling or fatigue.  Patient's blood pressure is elevated today which is unusual.      The following portions of the patient's history were reviewed and updated as appropriate: allergies, current medications, past family history, past medical history, past social history, past surgical history and problem list.      VITAL SIGNS     Visit Vitals  /90 (BP Location: Left arm)   Pulse 88   Ht 160 cm (63\")   Wt 85.3 kg (188 lb)   SpO2 97%   BMI 33.30 kg/m²         Wt Readings from Last 3 Encounters:   08/27/24 85.3 kg (188 lb)   07/16/24 86.6 kg (191 lb)   07/09/24 86.2 kg (190 lb)     Body mass index is 33.3 kg/m².      REVIEW OF SYSTEMS   ROS        PHYSICAL EXAMINATION     Constitutional:       Appearance: Healthy appearance.   Pulmonary:      Effort: Pulmonary effort is normal.   Cardiovascular:      PMI at left midclavicular line. Normal rate. Regular rhythm. Normal S1. Normal S2.       Murmurs: There is no murmur.      No gallop.  No click. No rub.   Pulses:     Intact distal pulses.   Edema:     Peripheral edema absent.   Neurological:      Mental Status: Alert.           REVIEWED DATA     Procedures    Cardiac Procedures:      Lipid Panel          10/10/2023    08:12 1/16/2024    08:11 7/9/2024    08:15   Lipid Panel   Total Cholesterol 178   162    Total Cholesterol  220     Triglycerides 300  413  243    HDL Cholesterol 31  29  30    VLDL Cholesterol 51  73  41    LDL Cholesterol  96  118  91    LDL/HDL Ratio 2.81  3.74  2.78          ASSESSMENT & PLAN      Diagnosis Plan   1. Old " MI (myocardial infarction)              SUMMARY/DISCUSSION  History of coronary artery disease previous myocardial infarction.  Clinically patient continues to do well he remains on a good medical regimen.  Hypertension patient's blood pressure is elevated today.  This is unusual so he is going to follow it and report back in several weeks.  If this was an anomaly that I would just see him back in a year sooner course if he has issues.        MEDICATIONS         Discharge Medications            Accurate as of August 27, 2024 11:59 PM. If you have any questions, ask your nurse or doctor.                Continue These Medications        Instructions Start Date   amLODIPine 5 MG tablet  Commonly known as: NORVASC   5 mg, Oral, Daily      atorvastatin 20 MG tablet  Commonly known as: Lipitor   Take 1 tablet by mouth nightly      Januvia 100 MG tablet  Generic drug: SITagliptin   100 mg, Oral, Daily      Jardiance 25 MG tablet tablet  Generic drug: empagliflozin   25 mg, Oral, Daily      Loratadine 10 MG capsule   10 mg, Oral, Daily PRN      losartan 50 MG tablet  Commonly known as: Cozaar   50 mg, Oral, Daily      metFORMIN  MG 24 hr tablet  Commonly known as: GLUCOPHAGE-XR   1,000 mg, Oral, 2 Times Daily      tadalafil 20 MG tablet  Commonly known as: Cialis   20 mg, Oral, Daily PRN      Vascepa 0.5 g capsule  Generic drug: Icosapent Ethyl   2,000 mg, Oral, 2 Times Daily                   **Dragon Disclaimer:   Much of this encounter note is an electronic transcription/translation of spoken language to printed text. The electronic translation of spoken language may permit erroneous, or at times, nonsensical words or phrases to be inadvertently transcribed. Although I have reviewed the note for such errors, some may still exist.

## 2024-10-01 ENCOUNTER — CONSULT (OUTPATIENT)
Dept: ONCOLOGY | Facility: CLINIC | Age: 64
End: 2024-10-01
Payer: COMMERCIAL

## 2024-10-01 ENCOUNTER — LAB (OUTPATIENT)
Dept: LAB | Facility: HOSPITAL | Age: 64
End: 2024-10-01
Payer: COMMERCIAL

## 2024-10-01 VITALS
WEIGHT: 188.2 LBS | HEART RATE: 80 BPM | TEMPERATURE: 98 F | RESPIRATION RATE: 18 BRPM | DIASTOLIC BLOOD PRESSURE: 87 MMHG | HEIGHT: 63 IN | BODY MASS INDEX: 33.35 KG/M2 | SYSTOLIC BLOOD PRESSURE: 136 MMHG | OXYGEN SATURATION: 95 %

## 2024-10-01 DIAGNOSIS — R79.89 ABNORMAL CBC: Primary | ICD-10-CM

## 2024-10-01 DIAGNOSIS — D69.6 THROMBOCYTOPENIA: Primary | ICD-10-CM

## 2024-10-01 DIAGNOSIS — K76.0 FATTY LIVER: ICD-10-CM

## 2024-10-01 LAB
BASOPHILS # BLD AUTO: 0.04 10*3/MM3 (ref 0–0.2)
BASOPHILS NFR BLD AUTO: 0.8 % (ref 0–1.5)
DEPRECATED RDW RBC AUTO: 40.7 FL (ref 37–54)
EOSINOPHIL # BLD AUTO: 0.12 10*3/MM3 (ref 0–0.4)
EOSINOPHIL NFR BLD AUTO: 2.3 % (ref 0.3–6.2)
ERYTHROCYTE [DISTWIDTH] IN BLOOD BY AUTOMATED COUNT: 12.5 % (ref 12.3–15.4)
HCT VFR BLD AUTO: 47.3 % (ref 37.5–51)
HGB BLD-MCNC: 16.1 G/DL (ref 13–17.7)
IMM GRANULOCYTES # BLD AUTO: 0.03 10*3/MM3 (ref 0–0.05)
IMM GRANULOCYTES NFR BLD AUTO: 0.6 % (ref 0–0.5)
LYMPHOCYTES # BLD AUTO: 1.58 10*3/MM3 (ref 0.7–3.1)
LYMPHOCYTES NFR BLD AUTO: 30 % (ref 19.6–45.3)
MCH RBC QN AUTO: 30.3 PG (ref 26.6–33)
MCHC RBC AUTO-ENTMCNC: 34 G/DL (ref 31.5–35.7)
MCV RBC AUTO: 89.1 FL (ref 79–97)
MONOCYTES # BLD AUTO: 0.43 10*3/MM3 (ref 0.1–0.9)
MONOCYTES NFR BLD AUTO: 8.2 % (ref 5–12)
NEUTROPHILS NFR BLD AUTO: 3.06 10*3/MM3 (ref 1.7–7)
NEUTROPHILS NFR BLD AUTO: 58.1 % (ref 42.7–76)
NRBC BLD AUTO-RTO: 0 /100 WBC (ref 0–0.2)
PLATELET # BLD AUTO: 151 10*3/MM3 (ref 140–450)
PMV BLD AUTO: 10.2 FL (ref 6–12)
RBC # BLD AUTO: 5.31 10*6/MM3 (ref 4.14–5.8)
WBC NRBC COR # BLD AUTO: 5.26 10*3/MM3 (ref 3.4–10.8)

## 2024-10-01 PROCEDURE — 85025 COMPLETE CBC W/AUTO DIFF WBC: CPT

## 2024-10-01 PROCEDURE — 36415 COLL VENOUS BLD VENIPUNCTURE: CPT

## 2024-10-01 NOTE — PROGRESS NOTES
[facility]    CONSULTING IN BLOOD DISORDERS & CANCER      REASON FOR CONSULTATION/CHIEF COMPLAINT:     Evaluation and management for thrombocytopenia                             REQUESTING PHYSICIAN: Mat Singh MD  RECORDS OBTAINED:  Records of the patients history including those from the electronic medical record were reviewed and summarized in detail.    HISTORY OF PRESENT ILLNESS:    The patient is a 64 y.o. year old male with medical history significant for DM 2, CAD, hypertension and dyslipidemia comes for thrombocytopenia evaluation and management.    Patient had a routine CBC performed through his PCP  in July 2024 which showed low platelet count of 130,000.  Rest of the CBC was within normal range.  On chart review, patient has had low normal platelet count in 109-138,000 range at least since 2016.  Patient denies any bleeding or easy bruises.    A CT A/P performed in July 2023 had noted liver enlargement, measuring 17.4 cm along with hepatic steatosis changes.  Patient reports of drinking alcohol 3-4 days each week, has been doing it for many years.    He has been referred to hematology for further evaluation and management.      Past Medical History:   Diagnosis Date    Arthritis     Chest pain     Colon polyps 12/15/2016    Rectum: fragments of hyperplastic polyp, colon at 60cm: fragments of hyperplastic polyp    Coronary artery disease     Diabetes mellitus     Gout     Hernia, inguinal     Hyperlipidemia     Hypertension     no meds at present    NSTEMI (non-ST elevated myocardial infarction) 10/2019    Umbilical hernia      Past Surgical History:   Procedure Laterality Date    CARDIAC CATHETERIZATION N/A 10/24/2019    Procedure: Left Heart Cath;  Surgeon: Ayesha Bagley MD;  Location: [facility] INVASIVE LOCATION;  Service: Cardiology    CARDIAC CATHETERIZATION N/A 10/24/2019    Procedure: Coronary angiography;  Surgeon: Ayesha Bagley MD;  Location: [facility] INVASIVE LOCATION;   Service: Cardiology    CARDIAC CATHETERIZATION N/A 10/24/2019    Procedure: Left ventriculography;  Surgeon: Ayesha Bagley MD;  Location: [facility] CATH INVASIVE LOCATION;  Service: Cardiology    COLONOSCOPY N/A 12/15/2016    Procedure: COLONOSCOPY TO CECUM AND TI WITH POLYPECTOMY COLD SNARE;  Surgeon: Chris Means MD;  Location: [facility] ENDOSCOPY;  Service:     EXTERNAL FIXATION ANKLE FRACTURE Left 03/23/2004    Debridement and irrigation, with implantation of antiobiotic beads, with placement of external fixator-Dr. Brandon Stanford, [facility]    HARDWARE REMOVAL FOOT / ANKLE Left 06/04/2004    Removal of syndesmotic screw, deep implant-Dr. Brandon Stanford, [facility]    INGUINAL HERNIA REPAIR Right 08/12/2022    Procedure: INGUINAL HERNIA REPAIR LAPAROSCOPIC RIGHT;  Surgeon: Glynn Pressley MD;  Location: [facility] OR [facility];  Service: General;  Laterality: Right;    UMBILICAL HERNIA REPAIR N/A 08/12/2022    Procedure: OPEN UMBILICAL HERNIA REPAIR;  Surgeon: Glynn Pressley MD;  Location: [facility] OR [facility];  Service: General;  Laterality: N/A;       MEDICATIONS    Current Outpatient Medications:     amLODIPine (NORVASC) 5 MG tablet, Take 1 tablet by mouth Daily., Disp: 30 tablet, Rfl: 11    atorvastatin (Lipitor) 20 MG tablet, Take 1 tablet by mouth nightly, Disp: 90 tablet, Rfl: 3    empagliflozin (Jardiance) 25 MG tablet tablet, Take 1 tablet by mouth Daily., Disp: 90 tablet, Rfl: 3    Icosapent Ethyl (Vascepa) 0.5 g capsule, Take 4 capsules by mouth 2 (Two) Times a Day., Disp: 720 capsule, Rfl: 3    Loratadine 10 MG capsule, Take 1 capsule by mouth Daily As Needed., Disp: , Rfl:     losartan (Cozaar) 50 MG tablet, Take 1 tablet by mouth Daily., Disp: 90 tablet, Rfl: 3    metFORMIN ER (GLUCOPHAGE-XR) 500 MG 24 hr tablet, Take 2 tablets by mouth 2 (Two) Times a Day., Disp: 360 tablet, Rfl: 3    SITagliptin (Januvia) 100 MG tablet, Take 1 tablet by mouth Daily., Disp: 90 tablet, Rfl: 1    tadalafil (Cialis) 20 MG tablet, Take 1 tablet by mouth  "Daily As Needed for Erectile Dysfunction., Disp: 30 tablet, Rfl: 2    ALLERGIES:     Allergies   Allergen Reactions    Semaglutide Diarrhea       SOCIAL HISTORY:       Social History     Socioeconomic History    Marital status:    Tobacco Use    Smoking status: Never    Smokeless tobacco: Never   Vaping Use    Vaping status: Never Used   Substance and Sexual Activity    Alcohol use: Yes     Alcohol/week: 4.0 standard drinks of alcohol     Types: 4 Shots of liquor per week     Comment: 2 drinks daily bourbons    Drug use: Never    Sexual activity: Defer         FAMILY HISTORY:  Family History   Problem Relation Age of Onset    Kidney disease Mother     Kidney disease Father     Breast cancer Sister     Diabetes Brother     Lymphoma Brother     Sudden death Brother 55        cancer    Malig Hyperthermia Neg Hx        REVIEW OF SYSTEMS:  As per HPI       Vitals:    10/01/24 1207   BP: 136/87   Pulse: 80   Resp: 18   Temp: 98 °F (36.7 °C)   TempSrc: Oral   SpO2: 95%   Weight: 85.4 kg (188 lb 3.2 oz)   Height: 160 cm (62.99\")   PainSc:   2   PainLoc: Hip         10/1/2024    12:08 PM   Current Status   ECOG score 0      PHYSICAL EXAM:    CONSTITUTIONAL:  Vital signs reviewed.  No distress, looks comfortable.  EYES:  Conjunctiva and lids unremarkable.   EARS,NOSE,MOUTH,THROAT:  Ears and nose appear unremarkable.  Lips, teeth, gums appear unremarkable.  RESPIRATORY:  Normal respiratory effort.  Lungs clear to auscultation bilaterally.  CARDIOVASCULAR:  Normal S1, S2.  No murmurs rubs or gallops.  No significant lower extremity edema.  GASTROINTESTINAL: Abdomen appears unremarkable.  Nondistended  LYMPHATIC:  No cervical, supraclavicular lymphadenopathy.  NEURO: AAOx3, no focal deficits.  Appears to have equal strength all 4 extremities.  MUSCULOSKELETAL:  Unremarkable digits/nails.  No cyanosis or clubbing.  No apparent joint deformities.  SKIN:  Warm.  No rashes.  PSYCHIATRIC:  Normal judgment and insight.  Normal " mood and affect.     RECENT LABS:        Lab on 10/01/2024   Component Date Value Ref Range Status    WBC 10/01/2024 5.26  3.40 - 10.80 10*3/mm3 Final    RBC 10/01/2024 5.31  4.14 - 5.80 10*6/mm3 Final    Hemoglobin 10/01/2024 16.1  13.0 - 17.7 g/dL Final    Hematocrit 10/01/2024 47.3  37.5 - 51.0 % Final    MCV 10/01/2024 89.1  79.0 - 97.0 fL Final    MCH 10/01/2024 30.3  26.6 - 33.0 pg Final    MCHC 10/01/2024 34.0  31.5 - 35.7 g/dL Final    RDW 10/01/2024 12.5  12.3 - 15.4 % Final    RDW-SD 10/01/2024 40.7  37.0 - 54.0 fl Final    MPV 10/01/2024 10.2  6.0 - 12.0 fL Final    Platelets 10/01/2024 151  140 - 450 10*3/mm3 Final    Neutrophil % 10/01/2024 58.1  42.7 - 76.0 % Final    Lymphocyte % 10/01/2024 30.0  19.6 - 45.3 % Final    Monocyte % 10/01/2024 8.2  5.0 - 12.0 % Final    Eosinophil % 10/01/2024 2.3  0.3 - 6.2 % Final    Basophil % 10/01/2024 0.8  0.0 - 1.5 % Final    Immature Grans % 10/01/2024 0.6 (H)  0.0 - 0.5 % Final    Neutrophils, Absolute 10/01/2024 3.06  1.70 - 7.00 10*3/mm3 Final    Lymphocytes, Absolute 10/01/2024 1.58  0.70 - 3.10 10*3/mm3 Final    Monocytes, Absolute 10/01/2024 0.43  0.10 - 0.90 10*3/mm3 Final    Eosinophils, Absolute 10/01/2024 0.12  0.00 - 0.40 10*3/mm3 Final    Basophils, Absolute 10/01/2024 0.04  0.00 - 0.20 10*3/mm3 Final    Immature Grans, Absolute 10/01/2024 0.03  0.00 - 0.05 10*3/mm3 Final    nRBC 10/01/2024 0.0  0.0 - 0.2 /100 WBC Final       ASSESSMENT:  Patient is a pleasant 64-year-old male with medical history significant for DM 2, CAD, hypertension and dyslipidemia comes for thrombocytopenia evaluation and management.    # Thrombocytopenia:  Patient had a routine CBC performed through his PCP  in July 2024 which showed low platelet count of 130,000.  Rest of the CBC was within normal range.    On chart review, patient has had low normal platelet count in 109-138,000 range at least since 2016.  Patient denies any bleeding or easy bruises.  A CT A/P  performed in July 2023 had noted liver enlargement, measuring 17.4 cm along with hepatic steatosis changes.  Patient reports of drinking alcohol 3-4 days each week, has been doing it for many years.  He also has history of DM2, not very well-controlled (A1c 7.3 in July 2024).  Informed patient that the mild and stable thrombocytopenia is likely due to underlying hepatomegaly/fatty liver disease.  Repeat CBC from today shows improvement in platelet count to 151,000.  Will defer further workup at this time.  Advised patient to stop alcohol use and adopt healthy lifestyle including regular exercise.  Advised to maintain adequate diabetes control.  Will repeat US abdomen in 6 months time to evaluate for hepatomegaly/fatty liver disease and follow-up after that.    # DM 2: Per PCP    # CAD: Per PCP/cardiology    PLAN:   -Mild stable thrombocytopenia likely due to hepatomegaly/fatty liver disease  -Advised to stop alcohol use and adopt healthy lifestyle  -US abdomen and repeat CBC in 6 months  -Follow-up in 6 months or sooner if needed    Orders Placed This Encounter   Procedures    US Abdomen Limited     Standing Status:   Future     Standing Expiration Date:   10/1/2025     Order Specific Question:   Reason for Exam:     Answer:   Evaluate for hepato-/splenomegaly     Order Specific Question:   Release to patient     Answer:   Routine Release [[id number]]    Comprehensive Metabolic Panel     Standing Status:   Future     Standing Expiration Date:   10/1/2025     Order Specific Question:   Release to patient     Answer:   Routine Release [[id number]]    CBC & Differential     Standing Status:   Future     Standing Expiration Date:   10/1/2025     Order Specific Question:   Manual Differential     Answer:   No     Order Specific Question:   Release to patient     Answer:   Routine Release [[id number]]   Total time spent during this patient encounter is 65 minutes. The total time spent with the patient includes: preparing  to see the patient by reviewing of tests, prior notes or other relevant information, performing appropriate independent examination & evaluation, counseling, ordering of medications, tests or procedures, documenting clinic information in the electronic medical records or other health records, independently interpreting results of tests and communicating the results to the patient/family or caregiver.

## 2024-10-02 ENCOUNTER — PATIENT ROUNDING (BHMG ONLY) (OUTPATIENT)
Dept: ONCOLOGY | Facility: CLINIC | Age: 64
End: 2024-10-02
Payer: COMMERCIAL

## 2024-10-02 NOTE — PROGRESS NOTES
A My-Chart message has been sent to the patient for PATIENT ROUNDING with [facility].

## 2024-10-08 DIAGNOSIS — N52.9 ERECTILE DYSFUNCTION, UNSPECIFIED ERECTILE DYSFUNCTION TYPE: ICD-10-CM

## 2024-10-09 RX ORDER — TADALAFIL 20 MG/1
20 TABLET ORAL DAILY PRN
Qty: 30 TABLET | Refills: 2 | Status: SHIPPED | OUTPATIENT
Start: 2024-10-09

## 2024-10-15 ENCOUNTER — OFFICE VISIT (OUTPATIENT)
Dept: INTERNAL MEDICINE | Facility: CLINIC | Age: 64
End: 2024-10-15
Payer: COMMERCIAL

## 2024-10-15 ENCOUNTER — LAB (OUTPATIENT)
Dept: LAB | Facility: HOSPITAL | Age: 64
End: 2024-10-15
Payer: COMMERCIAL

## 2024-10-15 VITALS
HEART RATE: 70 BPM | HEIGHT: 63 IN | WEIGHT: 193.2 LBS | DIASTOLIC BLOOD PRESSURE: 84 MMHG | RESPIRATION RATE: 16 BRPM | TEMPERATURE: 97.6 F | SYSTOLIC BLOOD PRESSURE: 128 MMHG | OXYGEN SATURATION: 98 % | BODY MASS INDEX: 34.23 KG/M2

## 2024-10-15 DIAGNOSIS — I10 ESSENTIAL HYPERTENSION: ICD-10-CM

## 2024-10-15 DIAGNOSIS — E78.5 HYPERLIPIDEMIA, UNSPECIFIED HYPERLIPIDEMIA TYPE: ICD-10-CM

## 2024-10-15 DIAGNOSIS — E11.65 TYPE 2 DIABETES MELLITUS WITH HYPERGLYCEMIA, WITHOUT LONG-TERM CURRENT USE OF INSULIN: ICD-10-CM

## 2024-10-15 DIAGNOSIS — N52.9 ERECTILE DYSFUNCTION, UNSPECIFIED ERECTILE DYSFUNCTION TYPE: ICD-10-CM

## 2024-10-15 LAB
ALBUMIN SERPL-MCNC: 4.1 G/DL (ref 3.5–5.2)
ALBUMIN UR-MCNC: <1.2 MG/DL
ALBUMIN/GLOB SERPL: 1.6 G/DL
ALP SERPL-CCNC: 83 U/L (ref 39–117)
ALT SERPL W P-5'-P-CCNC: 28 U/L (ref 1–41)
ANION GAP SERPL CALCULATED.3IONS-SCNC: 11.6 MMOL/L (ref 5–15)
AST SERPL-CCNC: 20 U/L (ref 1–40)
BASOPHILS # BLD AUTO: 0.02 10*3/MM3 (ref 0–0.2)
BASOPHILS NFR BLD AUTO: 0.5 % (ref 0–1.5)
BILIRUB SERPL-MCNC: 0.4 MG/DL (ref 0–1.2)
BUN SERPL-MCNC: 17 MG/DL (ref 8–23)
BUN/CREAT SERPL: 15 (ref 7–25)
CALCIUM SPEC-SCNC: 8.8 MG/DL (ref 8.6–10.5)
CHLORIDE SERPL-SCNC: 104 MMOL/L (ref 98–107)
CHOLEST SERPL-MCNC: 141 MG/DL (ref 0–200)
CO2 SERPL-SCNC: 24.4 MMOL/L (ref 22–29)
CREAT SERPL-MCNC: 1.13 MG/DL (ref 0.76–1.27)
CREAT UR-MCNC: 93.6 MG/DL
DEPRECATED RDW RBC AUTO: 44.6 FL (ref 37–54)
EGFRCR SERPLBLD CKD-EPI 2021: 73 ML/MIN/1.73
EOSINOPHIL # BLD AUTO: 0.08 10*3/MM3 (ref 0–0.4)
EOSINOPHIL NFR BLD AUTO: 1.9 % (ref 0.3–6.2)
ERYTHROCYTE [DISTWIDTH] IN BLOOD BY AUTOMATED COUNT: 13.1 % (ref 12.3–15.4)
GLOBULIN UR ELPH-MCNC: 2.5 GM/DL
GLUCOSE SERPL-MCNC: 161 MG/DL (ref 65–99)
HBA1C MFR BLD: 7.4 % (ref 4.8–5.6)
HCT VFR BLD AUTO: 46.9 % (ref 37.5–51)
HDLC SERPL-MCNC: 30 MG/DL (ref 40–60)
HGB BLD-MCNC: 15.4 G/DL (ref 13–17.7)
IMM GRANULOCYTES # BLD AUTO: 0.01 10*3/MM3 (ref 0–0.05)
IMM GRANULOCYTES NFR BLD AUTO: 0.2 % (ref 0–0.5)
LDLC SERPL CALC-MCNC: 86 MG/DL (ref 0–100)
LDLC/HDLC SERPL: 2.77 {RATIO}
LYMPHOCYTES # BLD AUTO: 1.43 10*3/MM3 (ref 0.7–3.1)
LYMPHOCYTES NFR BLD AUTO: 33.2 % (ref 19.6–45.3)
MCH RBC QN AUTO: 30.5 PG (ref 26.6–33)
MCHC RBC AUTO-ENTMCNC: 32.8 G/DL (ref 31.5–35.7)
MCV RBC AUTO: 92.9 FL (ref 79–97)
MICROALBUMIN/CREAT UR: NORMAL MG/G{CREAT}
MONOCYTES # BLD AUTO: 0.45 10*3/MM3 (ref 0.1–0.9)
MONOCYTES NFR BLD AUTO: 10.4 % (ref 5–12)
NEUTROPHILS NFR BLD AUTO: 2.32 10*3/MM3 (ref 1.7–7)
NEUTROPHILS NFR BLD AUTO: 53.8 % (ref 42.7–76)
NRBC BLD AUTO-RTO: 0 /100 WBC (ref 0–0.2)
PLATELET # BLD AUTO: 145 10*3/MM3 (ref 140–450)
PMV BLD AUTO: 10.2 FL (ref 6–12)
POTASSIUM SERPL-SCNC: 4 MMOL/L (ref 3.5–5.2)
PROT SERPL-MCNC: 6.6 G/DL (ref 6–8.5)
RBC # BLD AUTO: 5.05 10*6/MM3 (ref 4.14–5.8)
SODIUM SERPL-SCNC: 140 MMOL/L (ref 136–145)
TRIGL SERPL-MCNC: 140 MG/DL (ref 0–150)
VLDLC SERPL-MCNC: 25 MG/DL (ref 5–40)
WBC NRBC COR # BLD AUTO: 4.31 10*3/MM3 (ref 3.4–10.8)

## 2024-10-15 PROCEDURE — 82043 UR ALBUMIN QUANTITATIVE: CPT | Performed by: FAMILY MEDICINE

## 2024-10-15 PROCEDURE — 99214 OFFICE O/P EST MOD 30 MIN: CPT | Performed by: FAMILY MEDICINE

## 2024-10-15 PROCEDURE — 85025 COMPLETE CBC W/AUTO DIFF WBC: CPT | Performed by: FAMILY MEDICINE

## 2024-10-15 PROCEDURE — 36415 COLL VENOUS BLD VENIPUNCTURE: CPT | Performed by: FAMILY MEDICINE

## 2024-10-15 PROCEDURE — 80053 COMPREHEN METABOLIC PANEL: CPT | Performed by: FAMILY MEDICINE

## 2024-10-15 PROCEDURE — 80061 LIPID PANEL: CPT | Performed by: FAMILY MEDICINE

## 2024-10-15 PROCEDURE — 83036 HEMOGLOBIN GLYCOSYLATED A1C: CPT | Performed by: FAMILY MEDICINE

## 2024-10-15 PROCEDURE — 82570 ASSAY OF URINE CREATININE: CPT | Performed by: FAMILY MEDICINE

## 2024-10-15 RX ORDER — LOSARTAN POTASSIUM 50 MG/1
50 TABLET ORAL DAILY
Qty: 90 TABLET | Refills: 3 | Status: SHIPPED | OUTPATIENT
Start: 2024-10-15

## 2024-10-15 RX ORDER — ATORVASTATIN CALCIUM 20 MG/1
20 TABLET, FILM COATED ORAL DAILY
Qty: 90 TABLET | Refills: 3 | Status: SHIPPED | OUTPATIENT
Start: 2024-10-15

## 2024-10-15 RX ORDER — ICOSAPENT ETHYL 0.5 G/1
4 CAPSULE ORAL 2 TIMES DAILY
Qty: 720 CAPSULE | Refills: 3 | Status: SHIPPED | OUTPATIENT
Start: 2024-10-15

## 2024-10-15 RX ORDER — METFORMIN HCL 500 MG
1000 TABLET, EXTENDED RELEASE 24 HR ORAL 2 TIMES DAILY
Qty: 360 TABLET | Refills: 3 | Status: SHIPPED | OUTPATIENT
Start: 2024-10-15

## 2024-10-15 RX ORDER — AMLODIPINE BESYLATE 5 MG/1
5 TABLET ORAL DAILY
Qty: 30 TABLET | Refills: 11 | Status: SHIPPED | OUTPATIENT
Start: 2024-10-15

## 2024-10-15 RX ORDER — TADALAFIL 20 MG/1
20 TABLET ORAL DAILY PRN
Qty: 30 TABLET | Refills: 2 | Status: SHIPPED | OUTPATIENT
Start: 2024-10-15

## 2024-10-15 NOTE — PROGRESS NOTES
"Chief Complaint  Diabetes (No new concerns. Taking medications regularly. No symptoms of hypoglycemia, blurred vision, numbness in the feet.)    Subjective          Zane Joshi presents to [facility]  History of Present Illness  The patient is a 64-year-old male who presents for evaluation of multiple medical concerns. He is accompanied by his wife.    He reports feeling well overall, with no complaints of fatigue or sleep disturbances.    His current medication regimen includes atorvastatin 20 mg daily for cholesterol management, amlodipine 5 mg daily and losartan 50 mg daily for blood pressure control, Jardiance 25 mg daily, metformin 500 mg twice daily, and Januvia 100 mg daily for diabetes management, Vascepa for triglyceride control, and Cialis as needed for erectile dysfunction.    He has not recently had an eye examination.    His wife notes that he consumed orange juice this morning. He admits to not engaging in regular exercise or adhering to a specific diet, but maintains a generally healthy lifestyle. His work involves climbing 19 steps daily.    Objective   Vital Signs:   /84 (BP Location: Right arm, Patient Position: Sitting, Cuff Size: Adult)   Pulse 70   Temp 97.6 °F (36.4 °C) (Oral)   Resp 16   Ht 160 cm (62.99\")   Wt 87.6 kg (193 lb 3.2 oz)   SpO2 98%   BMI 34.23 kg/m²     Physical Exam  Vitals and nursing note reviewed.   Constitutional:       Appearance: He is well-developed.   HENT:      Head: Normocephalic and atraumatic.   Musculoskeletal:      Cervical back: Normal range of motion and neck supple.   Neurological:      Mental Status: He is alert and oriented to person, place, and time.   Psychiatric:         Behavior: Behavior normal.         Physical Exam  Vital Signs  Blood pressure reading today is 128/84.     Result Review :                 Assessment and Plan    Diagnoses and all orders for this visit:    1. Hyperlipidemia, unspecified " hyperlipidemia type  -     atorvastatin (Lipitor) 20 MG tablet; Take 1 tablet by mouth nightly  Dispense: 90 tablet; Refill: 3  -     Icosapent Ethyl (Vascepa) 0.5 g capsule; Take 4 capsules by mouth 2 (Two) Times a Day.  Dispense: 720 capsule; Refill: 3  -     Comprehensive Metabolic Panel  -     Lipid Panel With LDL / HDL Ratio    2. Essential hypertension  -     amLODIPine (NORVASC) 5 MG tablet; Take 1 tablet by mouth Daily.  Dispense: 30 tablet; Refill: 11  -     losartan (Cozaar) 50 MG tablet; Take 1 tablet by mouth Daily.  Dispense: 90 tablet; Refill: 3  -     Comprehensive Metabolic Panel  -     CBC & Differential    3. Type 2 diabetes mellitus with hyperglycemia, without long-term current use of insulin  -     empagliflozin (Jardiance) 25 MG tablet tablet; Take 1 tablet by mouth Daily.  Dispense: 90 tablet; Refill: 3  -     metFORMIN ER (GLUCOPHAGE-XR) 500 MG 24 hr tablet; Take 2 tablets by mouth 2 (Two) Times a Day.  Dispense: 360 tablet; Refill: 3  -     SITagliptin (Januvia) 100 MG tablet; Take 1 tablet by mouth Daily.  Dispense: 90 tablet; Refill: 1  -     Hemoglobin A1c  -     Cancel: Microalbumin / Creatinine Urine Ratio - Urine, Clean Catch    4. Erectile dysfunction, unspecified erectile dysfunction type  -     tadalafil (Cialis) 20 MG tablet; Take 1 tablet by mouth Daily As Needed for Erectile Dysfunction.  Dispense: 30 tablet; Refill: 2      Assessment & Plan  1. Type 2 Diabetes Mellitus.  He is currently taking Jardiance 25 mg daily, Metformin 1000 mg daily (500 mg twice a day), and Januvia 100 mg daily. His blood sugar levels are stable. Blood work will be conducted today to monitor his diabetes management. He is advised to continue his current medication regimen.    2. Hyperlipidemia.  He is on atorvastatin 20 mg daily for cholesterol management. Vascepa is also being taken for triglycerides. Blood work will be conducted today to monitor lipid levels. He is advised to continue his current  medication regimen.    3. Hypertension.  His blood pressure today is 128/84, which is within the normal range. He is currently taking amlodipine 5 mg daily and losartan 50 mg daily. He is advised to continue his current medication regimen.    4. Erectile Dysfunction.  He is using Cialis as needed for erectile dysfunction. No changes to this treatment plan are necessary at this time.    5. Health Maintenance.  He has not had his eyes checked recently and is advised to schedule an eye exam. Blood work will be conducted today to ensure he is good for the rest of the year.        Follow Up   No follow-ups on file.  Patient was given instructions and counseling regarding his condition or for health maintenance advice. Please see specific information pulled into the AVS if appropriate.           Patient or patient representative verbalized consent for the use of Ambient Listening during the visit with  Mat Singh MD for chart documentation. 10/15/2024  11:11 EDT

## 2024-12-13 ENCOUNTER — TRANSCRIBE ORDERS (OUTPATIENT)
Dept: ADMINISTRATIVE | Facility: HOSPITAL | Age: 64
End: 2024-12-13
Payer: COMMERCIAL

## 2024-12-13 DIAGNOSIS — M54.42 LEFT-SIDED LOW BACK PAIN WITH LEFT-SIDED SCIATICA, UNSPECIFIED CHRONICITY: Primary | ICD-10-CM

## 2024-12-23 ENCOUNTER — TRANSCRIBE ORDERS (OUTPATIENT)
Dept: ADMINISTRATIVE | Facility: HOSPITAL | Age: 64
End: 2024-12-23
Payer: COMMERCIAL

## 2024-12-23 DIAGNOSIS — S22.000A COMPRESSION FRACTURE OF BODY OF THORACIC VERTEBRA: Primary | ICD-10-CM

## 2025-01-09 ENCOUNTER — HOSPITAL ENCOUNTER (OUTPATIENT)
Dept: MRI IMAGING | Facility: HOSPITAL | Age: 65
Discharge: HOME | End: 2025-01-09
Admitting: SPECIALIST
Payer: COMMERCIAL

## 2025-01-09 DIAGNOSIS — S22.000A COMPRESSION FRACTURE OF BODY OF THORACIC VERTEBRA: ICD-10-CM

## 2025-01-09 PROCEDURE — 72148 MRI LUMBAR SPINE W/O DYE: CPT

## 2025-02-18 ENCOUNTER — OFFICE VISIT (OUTPATIENT)
Dept: INTERNAL MEDICINE | Facility: CLINIC | Age: 65
End: 2025-02-18
Payer: COMMERCIAL

## 2025-02-18 ENCOUNTER — LAB (OUTPATIENT)
Dept: LAB | Facility: HOSPITAL | Age: 65
End: 2025-02-18
Payer: COMMERCIAL

## 2025-02-18 VITALS
RESPIRATION RATE: 18 BRPM | HEIGHT: 63 IN | WEIGHT: 190 LBS | TEMPERATURE: 96.3 F | DIASTOLIC BLOOD PRESSURE: 74 MMHG | SYSTOLIC BLOOD PRESSURE: 122 MMHG | HEART RATE: 73 BPM | BODY MASS INDEX: 33.66 KG/M2 | OXYGEN SATURATION: 99 %

## 2025-02-18 DIAGNOSIS — E11.65 TYPE 2 DIABETES MELLITUS WITH HYPERGLYCEMIA, WITHOUT LONG-TERM CURRENT USE OF INSULIN: ICD-10-CM

## 2025-02-18 DIAGNOSIS — E78.5 HYPERLIPIDEMIA, UNSPECIFIED HYPERLIPIDEMIA TYPE: ICD-10-CM

## 2025-02-18 DIAGNOSIS — I10 ESSENTIAL HYPERTENSION: ICD-10-CM

## 2025-02-18 LAB
ALBUMIN SERPL-MCNC: 3.6 G/DL (ref 3.5–5.2)
ALBUMIN UR-MCNC: 1.3 MG/DL
ALBUMIN/GLOB SERPL: 1.1 G/DL
ALP SERPL-CCNC: 83 U/L (ref 39–117)
ALT SERPL W P-5'-P-CCNC: 27 U/L (ref 1–41)
ANION GAP SERPL CALCULATED.3IONS-SCNC: 9.7 MMOL/L (ref 5–15)
AST SERPL-CCNC: 20 U/L (ref 1–40)
BASOPHILS # BLD AUTO: 0.03 10*3/MM3 (ref 0–0.2)
BASOPHILS NFR BLD AUTO: 0.7 % (ref 0–1.5)
BILIRUB SERPL-MCNC: 0.6 MG/DL (ref 0–1.2)
BUN SERPL-MCNC: 15 MG/DL (ref 8–23)
BUN/CREAT SERPL: 15.5 (ref 7–25)
CALCIUM SPEC-SCNC: 8.7 MG/DL (ref 8.6–10.5)
CHLORIDE SERPL-SCNC: 103 MMOL/L (ref 98–107)
CHOLEST SERPL-MCNC: 145 MG/DL (ref 0–200)
CO2 SERPL-SCNC: 24.3 MMOL/L (ref 22–29)
CREAT SERPL-MCNC: 0.97 MG/DL (ref 0.76–1.27)
CREAT UR-MCNC: 153.4 MG/DL
DEPRECATED RDW RBC AUTO: 40 FL (ref 37–54)
EGFRCR SERPLBLD CKD-EPI 2021: 87.7 ML/MIN/1.73
EOSINOPHIL # BLD AUTO: 0.07 10*3/MM3 (ref 0–0.4)
EOSINOPHIL NFR BLD AUTO: 1.5 % (ref 0.3–6.2)
ERYTHROCYTE [DISTWIDTH] IN BLOOD BY AUTOMATED COUNT: 12.4 % (ref 12.3–15.4)
GLOBULIN UR ELPH-MCNC: 3.3 GM/DL
GLUCOSE SERPL-MCNC: 175 MG/DL (ref 65–99)
HBA1C MFR BLD: 6.8 % (ref 4.8–5.6)
HCT VFR BLD AUTO: 45.9 % (ref 37.5–51)
HDLC SERPL-MCNC: 31 MG/DL (ref 40–60)
HGB BLD-MCNC: 15.6 G/DL (ref 13–17.7)
IMM GRANULOCYTES # BLD AUTO: 0.01 10*3/MM3 (ref 0–0.05)
IMM GRANULOCYTES NFR BLD AUTO: 0.2 % (ref 0–0.5)
LDLC SERPL CALC-MCNC: 89 MG/DL (ref 0–100)
LDLC/HDLC SERPL: 2.76 {RATIO}
LYMPHOCYTES # BLD AUTO: 1.36 10*3/MM3 (ref 0.7–3.1)
LYMPHOCYTES NFR BLD AUTO: 29.6 % (ref 19.6–45.3)
MCH RBC QN AUTO: 30.1 PG (ref 26.6–33)
MCHC RBC AUTO-ENTMCNC: 34 G/DL (ref 31.5–35.7)
MCV RBC AUTO: 88.6 FL (ref 79–97)
MICROALBUMIN/CREAT UR: 8.5 MG/G (ref 0–29)
MONOCYTES # BLD AUTO: 0.47 10*3/MM3 (ref 0.1–0.9)
MONOCYTES NFR BLD AUTO: 10.2 % (ref 5–12)
NEUTROPHILS NFR BLD AUTO: 2.65 10*3/MM3 (ref 1.7–7)
NEUTROPHILS NFR BLD AUTO: 57.8 % (ref 42.7–76)
NRBC BLD AUTO-RTO: 0 /100 WBC (ref 0–0.2)
PLATELET # BLD AUTO: 137 10*3/MM3 (ref 140–450)
PMV BLD AUTO: 10.3 FL (ref 6–12)
POTASSIUM SERPL-SCNC: 4.3 MMOL/L (ref 3.5–5.2)
PROT SERPL-MCNC: 6.9 G/DL (ref 6–8.5)
RBC # BLD AUTO: 5.18 10*6/MM3 (ref 4.14–5.8)
SODIUM SERPL-SCNC: 137 MMOL/L (ref 136–145)
T-UPTAKE NFR SERPL: 1.08 TBI (ref 0.8–1.3)
T4 SERPL-MCNC: 6.59 MCG/DL (ref 4.5–11.7)
TRIGL SERPL-MCNC: 142 MG/DL (ref 0–150)
TSH SERPL DL<=0.05 MIU/L-ACNC: 2.57 UIU/ML (ref 0.27–4.2)
VLDLC SERPL-MCNC: 25 MG/DL (ref 5–40)
WBC NRBC COR # BLD AUTO: 4.59 10*3/MM3 (ref 3.4–10.8)

## 2025-02-18 PROCEDURE — 99214 OFFICE O/P EST MOD 30 MIN: CPT | Performed by: FAMILY MEDICINE

## 2025-02-18 PROCEDURE — 84436 ASSAY OF TOTAL THYROXINE: CPT | Performed by: FAMILY MEDICINE

## 2025-02-18 PROCEDURE — 82043 UR ALBUMIN QUANTITATIVE: CPT | Performed by: FAMILY MEDICINE

## 2025-02-18 PROCEDURE — 36415 COLL VENOUS BLD VENIPUNCTURE: CPT | Performed by: FAMILY MEDICINE

## 2025-02-18 PROCEDURE — 82570 ASSAY OF URINE CREATININE: CPT | Performed by: FAMILY MEDICINE

## 2025-02-18 PROCEDURE — 83036 HEMOGLOBIN GLYCOSYLATED A1C: CPT | Performed by: FAMILY MEDICINE

## 2025-02-18 PROCEDURE — 80061 LIPID PANEL: CPT | Performed by: FAMILY MEDICINE

## 2025-02-18 PROCEDURE — 80050 GENERAL HEALTH PANEL: CPT | Performed by: FAMILY MEDICINE

## 2025-02-18 PROCEDURE — 84479 ASSAY OF THYROID (T3 OR T4): CPT | Performed by: FAMILY MEDICINE

## 2025-02-18 RX ORDER — ICOSAPENT ETHYL 0.5 G/1
4 CAPSULE ORAL 2 TIMES DAILY
Qty: 720 CAPSULE | Refills: 3 | Status: SHIPPED | OUTPATIENT
Start: 2025-02-18

## 2025-02-18 RX ORDER — METFORMIN HYDROCHLORIDE 500 MG/1
1000 TABLET, EXTENDED RELEASE ORAL 2 TIMES DAILY
Qty: 360 TABLET | Refills: 3 | Status: SHIPPED | OUTPATIENT
Start: 2025-02-18

## 2025-02-18 RX ORDER — AMLODIPINE BESYLATE 5 MG/1
5 TABLET ORAL DAILY
Qty: 30 TABLET | Refills: 11 | Status: SHIPPED | OUTPATIENT
Start: 2025-02-18

## 2025-02-18 RX ORDER — LOSARTAN POTASSIUM 50 MG/1
50 TABLET ORAL DAILY
Qty: 90 TABLET | Refills: 3 | Status: SHIPPED | OUTPATIENT
Start: 2025-02-18

## 2025-02-18 RX ORDER — ATORVASTATIN CALCIUM 20 MG/1
20 TABLET, FILM COATED ORAL DAILY
Qty: 90 TABLET | Refills: 3 | Status: SHIPPED | OUTPATIENT
Start: 2025-02-18

## 2025-02-20 NOTE — PROGRESS NOTES
Please inform the patient of the following abnormal results. Hba1c has improved. Continue current medications.

## 2025-02-23 NOTE — PROGRESS NOTES
"Chief Complaint  Hyperlipidemia and Diabetes (Pt is fasting)    Subjective          Zane Joshi presents to [facility]  History of Present Illness  The patient is a 64-year-old male who presents for evaluation of diabetes, hypertension, hyperlipidemia, and erectile dysfunction. He is accompanied by his wife.    He reports overall good health and has no specific concerns at this time. He does not monitor his blood glucose levels at home. His current medication regimen includes metformin 1000 mg once daily, Jardiance 25 mg daily, and Januvia 100 mg daily.    He has an upcoming appointment with Dr. Pimentel on Monday for consultation regarding pain management for a compression fracture. A follow-up visit is scheduled with her on 03/04/2025.    He has a scheduled appointment with his cardiologist's nurse practitioner on 09/02/2025 at 9:40 AM.    He is scheduled to see Dr. Pradhan, hematologist, on 04/22/2025 at 10:20 AM.    MEDICATIONS  metformin, Jardiance, Januvia, losartan, amlodipine, atorvastatin, Vascepa, Cialis    Objective   Vital Signs:   /74   Pulse 73   Temp 96.3 °F (35.7 °C)   Resp 18   Ht 160 cm (62.99\")   Wt 86.2 kg (190 lb)   SpO2 99%   BMI 33.67 kg/m²     Physical Exam  Vitals and nursing note reviewed.   Constitutional:       Appearance: He is well-developed.   HENT:      Head: Normocephalic and atraumatic.   Musculoskeletal:      Cervical back: Normal range of motion and neck supple.   Neurological:      Mental Status: He is alert and oriented to person, place, and time.   Psychiatric:         Behavior: Behavior normal.         Physical Exam  Vital Signs  Blood pressure reading is 122/74.     Result Review :                 Assessment and Plan    Diagnoses and all orders for this visit:    1. Type 2 diabetes mellitus with hyperglycemia, without long-term current use of insulin  -     metFORMIN ER (GLUCOPHAGE-XR) 500 MG 24 hr tablet; Take 2 tablets by " mouth 2 (Two) Times a Day.  Dispense: 360 tablet; Refill: 3  -     empagliflozin (Jardiance) 25 MG tablet tablet; Take 1 tablet by mouth Daily.  Dispense: 90 tablet; Refill: 3  -     SITagliptin (Januvia) 100 MG tablet; Take 1 tablet by mouth Daily.  Dispense: 90 tablet; Refill: 1  -     CBC & Differential  -     Comprehensive Metabolic Panel  -     Hemoglobin A1c  -     Thyroid Panel With TSH  -     Microalbumin / Creatinine Urine Ratio - Urine, Clean Catch    2. Essential hypertension  -     losartan (Cozaar) 50 MG tablet; Take 1 tablet by mouth Daily.  Dispense: 90 tablet; Refill: 3  -     amLODIPine (NORVASC) 5 MG tablet; Take 1 tablet by mouth Daily.  Dispense: 30 tablet; Refill: 11  -     CBC & Differential  -     Comprehensive Metabolic Panel    3. Hyperlipidemia, unspecified hyperlipidemia type  -     atorvastatin (Lipitor) 20 MG tablet; Take 1 tablet by mouth nightly  Dispense: 90 tablet; Refill: 3  -     Icosapent Ethyl (Vascepa) 0.5 g capsule; Take 4 capsules by mouth 2 (Two) Times a Day.  Dispense: 720 capsule; Refill: 3  -     Lipid Panel With LDL / HDL Ratio      Assessment & Plan  1. Diabetes mellitus.  He is currently on metformin 1000 mg once daily, Jardiance 25 mg daily, and Januvia 100 mg daily. He does not check his blood sugars at home. Comprehensive blood work will be conducted today to monitor his condition.    2. Hypertension.  His blood pressure is well-controlled at 122/74 mmHg. He is currently taking losartan 50 mg daily and amlodipine 5 mg daily.    3. Hyperlipidemia.  He is currently on atorvastatin 20 mg daily and Vascepa.    4. Erectile dysfunction.  He continues to take Cialis as needed.    5. Compression fracture.  He recently saw Dr. Pimentel for a compression fracture and has a follow-up appointment on 03/04/2025.    6. Cardiology follow-up.  He has an upcoming appointment with the cardiology office on 09/02/2025 at 9:40 AM.    7. Hematology follow-up.  He has an upcoming  appointment with Dr. Pradhan, hematologist, on 04/22/2025 at 10:20 AM.    Follow Up   No follow-ups on file.  Patient was given instructions and counseling regarding his condition or for health maintenance advice. Please see specific information pulled into the AVS if appropriate.           Patient or patient representative verbalized consent for the use of Ambient Listening during the visit with  Mat Singh MD for chart documentation. 2/23/2025  10:21 EST

## 2025-03-28 ENCOUNTER — OFFICE VISIT (OUTPATIENT)
Dept: PAIN MEDICINE | Facility: CLINIC | Age: 65
End: 2025-03-28
Payer: COMMERCIAL

## 2025-03-28 ENCOUNTER — PREP FOR SURGERY (OUTPATIENT)
Dept: OTHER | Facility: HOSPITAL | Age: 65
End: 2025-03-28
Payer: COMMERCIAL

## 2025-03-28 VITALS
TEMPERATURE: 97.5 F | HEART RATE: 110 BPM | SYSTOLIC BLOOD PRESSURE: 156 MMHG | OXYGEN SATURATION: 95 % | BODY MASS INDEX: 32.25 KG/M2 | RESPIRATION RATE: 18 BRPM | HEIGHT: 63 IN | DIASTOLIC BLOOD PRESSURE: 77 MMHG | WEIGHT: 182 LBS

## 2025-03-28 DIAGNOSIS — M54.50 CHRONIC BILATERAL LOW BACK PAIN, UNSPECIFIED WHETHER SCIATICA PRESENT: ICD-10-CM

## 2025-03-28 DIAGNOSIS — M54.16 RIGHT LUMBAR RADICULOPATHY: Primary | ICD-10-CM

## 2025-03-28 DIAGNOSIS — G89.29 CHRONIC BILATERAL LOW BACK PAIN, UNSPECIFIED WHETHER SCIATICA PRESENT: ICD-10-CM

## 2025-03-28 DIAGNOSIS — M48.061 LUMBAR FORAMINAL STENOSIS: ICD-10-CM

## 2025-03-28 DIAGNOSIS — M51.362 DEGENERATION OF INTERVERTEBRAL DISC OF LUMBAR REGION WITH DISCOGENIC BACK PAIN AND LOWER EXTREMITY PAIN: ICD-10-CM

## 2025-03-28 NOTE — H&P (VIEW-ONLY)
"CHIEF COMPLAINT  Back pain    Subjective   Zane Joshi is a 64 y.o. male.   He presents to the office for evaluation of back pain. He was referred here by Dr. Jenn Pimentel.     Patient is unsure when back pain started and states he is here because he PCP referred him for possible injections.     Today pain is 9/10VAS in severity. Pain is located in the middle of his back and radiates down his leg. He is unable to recall a specific pattern, just states his \"whole leg\" hurts. Describes this pain as a nearly continuous ache. Pain is worsened by walking long distances, prolonged standing, and increased physical activity. Pain improves with rest/reposition, heat, and medications. He is not completed any formalized PT for this issue.      Current pain medication includes OTC Ibuprofen. He was prescribed Tramadol back in January but reports no relief from this medication.     He takes a baby aspirin daily. A1C from 2/18/25 is 6.8    Past therapies:  Physical Therapy: No  Chiropractor: No  Massage Therapy: Yes  TENS: No  Neck or back surgery: No  Past pain management: No    Back Pain  This is a chronic problem. The current episode started more than 1 year ago. The problem occurs constantly. The problem has been worse since onset. The pain is present in the lumbar spine. The quality of the pain is described as aching. Radiates to: pain radiates down his \"whole leg\" - right leg. The pain is at a severity of 9/10. The pain is severe. The symptoms are aggravated by position, bending, standing and twisting. Pertinent negatives include no abdominal pain, chest pain, fever, headaches, numbness or weakness. He has tried heat and NSAIDs for the symptoms.     PEG Assessment   What number best describes your pain on average in the past week?5  What number best describes how, during the past week, pain has interfered with your enjoyment of life?0  What number best describes how, during the past week, pain has interfered with " your general activity?  0      Current Outpatient Medications:     amLODIPine (NORVASC) 5 MG tablet, Take 1 tablet by mouth Daily., Disp: 30 tablet, Rfl: 11    atorvastatin (Lipitor) 20 MG tablet, Take 1 tablet by mouth nightly, Disp: 90 tablet, Rfl: 3    empagliflozin (Jardiance) 25 MG tablet tablet, Take 1 tablet by mouth Daily., Disp: 90 tablet, Rfl: 3    Icosapent Ethyl (Vascepa) 0.5 g capsule, Take 4 capsules by mouth 2 (Two) Times a Day., Disp: 720 capsule, Rfl: 3    Loratadine 10 MG capsule, Take 1 capsule by mouth Daily As Needed., Disp: , Rfl:     losartan (Cozaar) 50 MG tablet, Take 1 tablet by mouth Daily., Disp: 90 tablet, Rfl: 3    metFORMIN ER (GLUCOPHAGE-XR) 500 MG 24 hr tablet, Take 2 tablets by mouth 2 (Two) Times a Day., Disp: 360 tablet, Rfl: 3    SITagliptin (Januvia) 100 MG tablet, Take 1 tablet by mouth Daily., Disp: 90 tablet, Rfl: 1    tadalafil (Cialis) 20 MG tablet, Take 1 tablet by mouth Daily As Needed for Erectile Dysfunction., Disp: 30 tablet, Rfl: 2    traMADol (ULTRAM) 50 MG tablet, Take 1 tablet by mouth Every 12 (Twelve) Hours As Needed for pain. (Patient not taking: Reported on 3/28/2025), Disp: 14 tablet, Rfl: 0    The following portions of the patient's history were reviewed and updated as appropriate: allergies, current medications, past family history, past medical history, past social history, past surgical history, and problem list.    REVIEW OF PERTINENT MEDICAL DATA  Reviewed office note from Dr. Jenn Pimentel from 1/14/2025.  Presents with history of low back pain that radiates into her right lower extremity.  Denies numbness and tingling.  He denies any bladder incontinence.  MRI was ordered at his last appointment because there was concern for a possible fracture.  MRI shows degenerative changes throughout the lumbar spine.  He was referred to pain management for injections at this time.  He was also prescribed tramadol 50 mg to take as needed for pain.  He declined a  referral to PT.    MRI OF THE LUMBAR SPINE WITHOUT CONTRAST ON 01/09/2025     CLINICAL HISTORY: This is a 64-year-old male patient with a history of  compression fracture body of thoracic vertebrae. Patient has low back  pain that radiates down bilateral legs.     TECHNIQUE: Sagittal T1, proton density and T2 and fat-suppressed T2  weighted images were obtained of the lumbar spine. In addition thin-cut  axial T1 and T2 weighted images were obtained angled through the  interspaces from L2-S1 and axial T2 weighted images were obtained from  the T11-12 interspace level down to the S2 sacral level.     COMPARISON: There are no prior lumbar spine imaging studies from [facility] for comparison.     FINDINGS: The distal thoracic cord and the conus is normal in signal  intensity. The conus terminates at the horizontal level of the mid body  of L1 which is normal.     At T11-12, the disc space and facets are normal in appearance with no  canal or foraminal narrowing.     At T12-L1, there is mild anterior marginal endplate spurring. The  posterior disc margin and facets are normal with no canal or foraminal  narrowing.      At L1-2 there is minimal anterior marginal endplate spurring. The  posterior disc margin and facets are normal and there is no canal or  foraminal narrowing.     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 Its apex is at the L4-5 lumbar level.     At L2-3, there is mild bilateral facet overgrowth. There is some disc  space narrowing and degenerative endplate changes. There is a 2 to 3 mm  retrolisthesis of L2 with respect to L3 with some unroofed disc material  bulging diffusely along the posterior superior endplate of L3 and there  is moderate to severe narrowing of the thecal sac. There is spurring and  bulging disc material extending into the neural foramen and mild left  and mild-moderate right foraminal narrowing.     At L3-4, there is moderate  bilateral facet overgrowth. There is disc  space narrowing, there are degenerative endplate changes most pronounced  in the right side of the endplates along the inner margin of the  levoscoliotic curve and there is diffuse posterior disc osteophyte  complex and there is severe narrowing of the thecal sac with slight  diminished spinal fluid bathing the cauda equina at this level. There is  some right lateral recess narrowing that could affect the traversing  right L4 nerve root. There is synovial thickening of the left facets  extending to the posterior left foramen and mild bulging disc material  into the inferior left foramen and there is mild-moderate left foraminal  narrowing. There is eccentric disc osteophyte complex extending into the  inferior right foramen and to the far right laterally and there is  moderate to severe right foraminal narrowing that could affect the  exiting right L3 nerve root.     At L4-5, there is moderate bilateral facet overgrowth. There is a 6 mm  rotational anterolisthesis of the right side of the L4 vertebrae with  respect to the right side of L5. There is unroofing of the posterior  central to right foraminal disc space due to the rotary anterolisthesis  and there is unroofed disc material bulging along the posterior inferior  endplate of L4 and into the inferior aspect of the right neural foramen.  There is moderate narrowing of the right side of the thecal sac, some  right lateral recess narrowing that could affect the traversing right L5  nerve root. There is mild left foraminal narrowing and there is some  loss of vertical height of the right foramen, unroofed disc material  bulging into the inferior right foramen and there is at least moderate  up to moderate to severe right foraminal narrowing that could affect the  exiting right L4 nerve root.     At L5-S1 there is disc space narrowing, degenerative endplate changes, 3  mm retrolisthesis of L5 with respect to S1 and there  is diffuse  posterior disc osteophyte complex that abuts the ventral aspect of the  traversing S1 nerve roots but does not displace them. There is no  significant canal or lateral recess narrowing. There is spurring into  the foramina and there is mild right and there is moderate left bony  foraminal narrowing.      IMPRESSION:  1. 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     2. The posterior disc margins and facets are normal with no canal or  foraminal narrowing from T11-L2.     3.  At L2-3, there is mild bilateral facet overgrowth and a 2 to 3 mm  retrolisthesis of L2 with respect to L3 and a mild diffuse posterior  disc osteophyte complex. There is moderate to severe narrowing of the  lumbar thecal sac and mild left and mild-moderate right foraminal  narrowing.     4. At L3-4, there is moderate bilateral facet overgrowth, there is a  diffuse posterior disc osteophyte complex and there is severe narrowing  of the thecal sac. There is some right lateral recess narrowing that  could affect the traversing right L4 nerve root, there is mild to  moderate left and there is moderate to severe right foraminal narrowing  that could affect the exiting L3 nerve roots bilaterally right greater  than left.     5. At L4-5, there is moderate bilateral facet overgrowth and a 6 mm  rotatory anterolisthesis of the right side of L4 with respect to L5,  unroofed disc material diffusely bulging or protruding along the  posterior central to right foraminal inferior endplate of L4 and there  is moderate narrowing of the right side of the thecal sac, some right  lateral recess narrowing that could affect the traversing right L5 nerve  root and there is mild left foraminal narrowing and there is loss of  vertical height of the right  "foramen, unroofed disc material bulging  into the inferior right foramen and there is moderate to severe right  foraminal narrowing that could affect the exiting right L4 nerve root.     6. L5-S1, there is 3 mm retrolisthesis of L5 with respect to S1 and  diffuse posterior disc osteophyte complex that abuts the ventral aspect  of the traversing S1 nerve roots but does not displace them. There is no  significant canal or lateral recess narrowing. There is mild right and  moderate left foraminal narrowing. The remainder of the lumbar spine MRI  is unremarkable.      This report was finalized on 1/10/2025 5:57 AM by Dr. Yuan Miranda M.D  on Workstation: [id number]    Review of Systems   Constitutional:  Negative for activity change, fatigue and fever.   Respiratory:  Negative for cough and chest tightness.    Cardiovascular:  Negative for chest pain.   Gastrointestinal:  Negative for abdominal pain, constipation and diarrhea.   Musculoskeletal:  Positive for back pain.   Neurological:  Negative for dizziness, weakness, light-headedness, numbness and headaches.   Psychiatric/Behavioral:  Positive for agitation (very). Negative for sleep disturbance and suicidal ideas. The patient is not nervous/anxious.      I have reviewed and confirmed the accuracy of the ROS as documented by the MA/LPN/RN DONAL Smalls    Vitals:    03/28/25 0840   BP: 156/77   BP Location: Left arm   Patient Position: Sitting   Cuff Size: Adult   Pulse: 110   Resp: 18   Temp: 97.5 °F (36.4 °C)   TempSrc: Temporal   SpO2: 95%   Weight: 82.6 kg (182 lb)   Height: 160 cm (62.99\")   PainSc: 9      Objective     Physical Exam  Constitutional:       Appearance: Normal appearance.   HENT:      Head: Normocephalic.   Cardiovascular:      Rate and Rhythm: Normal rate and regular rhythm.   Pulmonary:      Effort: Pulmonary effort is normal.      Breath sounds: Normal breath sounds.   Musculoskeletal:         General: Normal range of motion.      " Cervical back: Normal range of motion.      Lumbar back: No tenderness or bony tenderness. Decreased range of motion. Negative right straight leg raise test and negative left straight leg raise test.   Skin:     General: Skin is warm and dry.      Capillary Refill: Capillary refill takes less than 2 seconds.   Neurological:      General: No focal deficit present.      Mental Status: He is alert and oriented to person, place, and time.   Psychiatric:         Mood and Affect: Mood normal.         Behavior: Behavior normal.         Thought Content: Thought content normal.         Cognition and Memory: Cognition normal.       Assessment & Plan   Diagnoses and all orders for this visit:    1. Right lumbar radiculopathy (Primary)    2. Lumbar foraminal stenosis    3. Chronic bilateral low back pain, unspecified whether sciatica present    4. Degeneration of intervertebral disc of lumbar region with discogenic back pain and lower extremity pain      --- Zane Joshi reports a pain score of 9.  Given his pain assessment as noted, treatment options were discussed and the following options were decided upon as a follow-up plan to address the patient's pain: continuation of current treatment plan for pain and steroid injections.    --- Right L4 & L5 TF LESI (please evaluate painful levels under fluro)  ---  Indications for epidural injection:  Plan is to proceed with epidural at the appropriate level.  If the patient receives significant pain reduction and improvement in function and the plan will be to repeat the epidural when the pain worsens.  If a second epidural provides at least 6 weeks of sustained improvement that includes both pain reduction and improvement in function then an epidural injection could be repeated once again at the same level.  This is a mutual decision between the clinician and the patient that includes discussions including risks and benefits in detail as well as alternative therapies.   Patient's questions were answered to their satisfaction and to their understanding.  ---   --- Patient may also be a canidate for lumbar MBB/RFA due to complains of axial back pain and degenerative changes notes on his MRI  --- Offered referral to PT but patient declined.  --- Follow-up for procedure      JAYSON REPORT  As the clinician, I personally reviewed the JAYSON from 3/28/25 while the patient was in the office today.    Dictated utilizing Dragon dictation.

## 2025-03-28 NOTE — PROGRESS NOTES
"CHIEF COMPLAINT  Back pain    Subjective   Zane Joshi is a 64 y.o. male.   He presents to the office for evaluation of back pain. He was referred here by Dr. Jenn Pimentel.     Patient is unsure when back pain started and states he is here because he PCP referred him for possible injections.     Today pain is 9/10VAS in severity. Pain is located in the middle of his back and radiates down his leg. He is unable to recall a specific pattern, just states his \"whole leg\" hurts. Describes this pain as a nearly continuous ache. Pain is worsened by walking long distances, prolonged standing, and increased physical activity. Pain improves with rest/reposition, heat, and medications. He is not completed any formalized PT for this issue.      Current pain medication includes OTC Ibuprofen. He was prescribed Tramadol back in January but reports no relief from this medication.     He takes a baby aspirin daily. A1C from 2/18/25 is 6.8    Past therapies:  Physical Therapy: No  Chiropractor: No  Massage Therapy: Yes  TENS: No  Neck or back surgery: No  Past pain management: No    Back Pain  This is a chronic problem. The current episode started more than 1 year ago. The problem occurs constantly. The problem has been worse since onset. The pain is present in the lumbar spine. The quality of the pain is described as aching. Radiates to: pain radiates down his \"whole leg\" - right leg. The pain is at a severity of 9/10. The pain is severe. The symptoms are aggravated by position, bending, standing and twisting. Pertinent negatives include no abdominal pain, chest pain, fever, headaches, numbness or weakness. He has tried heat and NSAIDs for the symptoms.     PEG Assessment   What number best describes your pain on average in the past week?5  What number best describes how, during the past week, pain has interfered with your enjoyment of life?0  What number best describes how, during the past week, pain has interfered with " your general activity?  0      Current Outpatient Medications:     amLODIPine (NORVASC) 5 MG tablet, Take 1 tablet by mouth Daily., Disp: 30 tablet, Rfl: 11    atorvastatin (Lipitor) 20 MG tablet, Take 1 tablet by mouth nightly, Disp: 90 tablet, Rfl: 3    empagliflozin (Jardiance) 25 MG tablet tablet, Take 1 tablet by mouth Daily., Disp: 90 tablet, Rfl: 3    Icosapent Ethyl (Vascepa) 0.5 g capsule, Take 4 capsules by mouth 2 (Two) Times a Day., Disp: 720 capsule, Rfl: 3    Loratadine 10 MG capsule, Take 1 capsule by mouth Daily As Needed., Disp: , Rfl:     losartan (Cozaar) 50 MG tablet, Take 1 tablet by mouth Daily., Disp: 90 tablet, Rfl: 3    metFORMIN ER (GLUCOPHAGE-XR) 500 MG 24 hr tablet, Take 2 tablets by mouth 2 (Two) Times a Day., Disp: 360 tablet, Rfl: 3    SITagliptin (Januvia) 100 MG tablet, Take 1 tablet by mouth Daily., Disp: 90 tablet, Rfl: 1    tadalafil (Cialis) 20 MG tablet, Take 1 tablet by mouth Daily As Needed for Erectile Dysfunction., Disp: 30 tablet, Rfl: 2    traMADol (ULTRAM) 50 MG tablet, Take 1 tablet by mouth Every 12 (Twelve) Hours As Needed for pain. (Patient not taking: Reported on 3/28/2025), Disp: 14 tablet, Rfl: 0    The following portions of the patient's history were reviewed and updated as appropriate: allergies, current medications, past family history, past medical history, past social history, past surgical history, and problem list.    REVIEW OF PERTINENT MEDICAL DATA  Reviewed office note from Dr. Jenn Pimentel from 1/14/2025.  Presents with history of low back pain that radiates into her right lower extremity.  Denies numbness and tingling.  He denies any bladder incontinence.  MRI was ordered at his last appointment because there was concern for a possible fracture.  MRI shows degenerative changes throughout the lumbar spine.  He was referred to pain management for injections at this time.  He was also prescribed tramadol 50 mg to take as needed for pain.  He declined a  referral to PT.    MRI OF THE LUMBAR SPINE WITHOUT CONTRAST ON 01/09/2025     CLINICAL HISTORY: This is a 64-year-old male patient with a history of  compression fracture body of thoracic vertebrae. Patient has low back  pain that radiates down bilateral legs.     TECHNIQUE: Sagittal T1, proton density and T2 and fat-suppressed T2  weighted images were obtained of the lumbar spine. In addition thin-cut  axial T1 and T2 weighted images were obtained angled through the  interspaces from L2-S1 and axial T2 weighted images were obtained from  the T11-12 interspace level down to the S2 sacral level.     COMPARISON: There are no prior lumbar spine imaging studies from [facility] for comparison.     FINDINGS: The distal thoracic cord and the conus is normal in signal  intensity. The conus terminates at the horizontal level of the mid body  of L1 which is normal.     At T11-12, the disc space and facets are normal in appearance with no  canal or foraminal narrowing.     At T12-L1, there is mild anterior marginal endplate spurring. The  posterior disc margin and facets are normal with no canal or foraminal  narrowing.      At L1-2 there is minimal anterior marginal endplate spurring. The  posterior disc margin and facets are normal and there is no canal or  foraminal narrowing.     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 Its apex is at the L4-5 lumbar level.     At L2-3, there is mild bilateral facet overgrowth. There is some disc  space narrowing and degenerative endplate changes. There is a 2 to 3 mm  retrolisthesis of L2 with respect to L3 with some unroofed disc material  bulging diffusely along the posterior superior endplate of L3 and there  is moderate to severe narrowing of the thecal sac. There is spurring and  bulging disc material extending into the neural foramen and mild left  and mild-moderate right foraminal narrowing.     At L3-4, there is moderate  bilateral facet overgrowth. There is disc  space narrowing, there are degenerative endplate changes most pronounced  in the right side of the endplates along the inner margin of the  levoscoliotic curve and there is diffuse posterior disc osteophyte  complex and there is severe narrowing of the thecal sac with slight  diminished spinal fluid bathing the cauda equina at this level. There is  some right lateral recess narrowing that could affect the traversing  right L4 nerve root. There is synovial thickening of the left facets  extending to the posterior left foramen and mild bulging disc material  into the inferior left foramen and there is mild-moderate left foraminal  narrowing. There is eccentric disc osteophyte complex extending into the  inferior right foramen and to the far right laterally and there is  moderate to severe right foraminal narrowing that could affect the  exiting right L3 nerve root.     At L4-5, there is moderate bilateral facet overgrowth. There is a 6 mm  rotational anterolisthesis of the right side of the L4 vertebrae with  respect to the right side of L5. There is unroofing of the posterior  central to right foraminal disc space due to the rotary anterolisthesis  and there is unroofed disc material bulging along the posterior inferior  endplate of L4 and into the inferior aspect of the right neural foramen.  There is moderate narrowing of the right side of the thecal sac, some  right lateral recess narrowing that could affect the traversing right L5  nerve root. There is mild left foraminal narrowing and there is some  loss of vertical height of the right foramen, unroofed disc material  bulging into the inferior right foramen and there is at least moderate  up to moderate to severe right foraminal narrowing that could affect the  exiting right L4 nerve root.     At L5-S1 there is disc space narrowing, degenerative endplate changes, 3  mm retrolisthesis of L5 with respect to S1 and there  is diffuse  posterior disc osteophyte complex that abuts the ventral aspect of the  traversing S1 nerve roots but does not displace them. There is no  significant canal or lateral recess narrowing. There is spurring into  the foramina and there is mild right and there is moderate left bony  foraminal narrowing.      IMPRESSION:  1. 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     2. The posterior disc margins and facets are normal with no canal or  foraminal narrowing from T11-L2.     3.  At L2-3, there is mild bilateral facet overgrowth and a 2 to 3 mm  retrolisthesis of L2 with respect to L3 and a mild diffuse posterior  disc osteophyte complex. There is moderate to severe narrowing of the  lumbar thecal sac and mild left and mild-moderate right foraminal  narrowing.     4. At L3-4, there is moderate bilateral facet overgrowth, there is a  diffuse posterior disc osteophyte complex and there is severe narrowing  of the thecal sac. There is some right lateral recess narrowing that  could affect the traversing right L4 nerve root, there is mild to  moderate left and there is moderate to severe right foraminal narrowing  that could affect the exiting L3 nerve roots bilaterally right greater  than left.     5. At L4-5, there is moderate bilateral facet overgrowth and a 6 mm  rotatory anterolisthesis of the right side of L4 with respect to L5,  unroofed disc material diffusely bulging or protruding along the  posterior central to right foraminal inferior endplate of L4 and there  is moderate narrowing of the right side of the thecal sac, some right  lateral recess narrowing that could affect the traversing right L5 nerve  root and there is mild left foraminal narrowing and there is loss of  vertical height of the right  "foramen, unroofed disc material bulging  into the inferior right foramen and there is moderate to severe right  foraminal narrowing that could affect the exiting right L4 nerve root.     6. L5-S1, there is 3 mm retrolisthesis of L5 with respect to S1 and  diffuse posterior disc osteophyte complex that abuts the ventral aspect  of the traversing S1 nerve roots but does not displace them. There is no  significant canal or lateral recess narrowing. There is mild right and  moderate left foraminal narrowing. The remainder of the lumbar spine MRI  is unremarkable.      This report was finalized on 1/10/2025 5:57 AM by Dr. Yuan Miranda M.D  on Workstation: [id number]    Review of Systems   Constitutional:  Negative for activity change, fatigue and fever.   Respiratory:  Negative for cough and chest tightness.    Cardiovascular:  Negative for chest pain.   Gastrointestinal:  Negative for abdominal pain, constipation and diarrhea.   Musculoskeletal:  Positive for back pain.   Neurological:  Negative for dizziness, weakness, light-headedness, numbness and headaches.   Psychiatric/Behavioral:  Positive for agitation (very). Negative for sleep disturbance and suicidal ideas. The patient is not nervous/anxious.      I have reviewed and confirmed the accuracy of the ROS as documented by the MA/LPN/RN DONAL Smalls    Vitals:    03/28/25 0840   BP: 156/77   BP Location: Left arm   Patient Position: Sitting   Cuff Size: Adult   Pulse: 110   Resp: 18   Temp: 97.5 °F (36.4 °C)   TempSrc: Temporal   SpO2: 95%   Weight: 82.6 kg (182 lb)   Height: 160 cm (62.99\")   PainSc: 9      Objective     Physical Exam  Constitutional:       Appearance: Normal appearance.   HENT:      Head: Normocephalic.   Cardiovascular:      Rate and Rhythm: Normal rate and regular rhythm.   Pulmonary:      Effort: Pulmonary effort is normal.      Breath sounds: Normal breath sounds.   Musculoskeletal:         General: Normal range of motion.      " Cervical back: Normal range of motion.      Lumbar back: No tenderness or bony tenderness. Decreased range of motion. Negative right straight leg raise test and negative left straight leg raise test.   Skin:     General: Skin is warm and dry.      Capillary Refill: Capillary refill takes less than 2 seconds.   Neurological:      General: No focal deficit present.      Mental Status: He is alert and oriented to person, place, and time.   Psychiatric:         Mood and Affect: Mood normal.         Behavior: Behavior normal.         Thought Content: Thought content normal.         Cognition and Memory: Cognition normal.       Assessment & Plan   Diagnoses and all orders for this visit:    1. Right lumbar radiculopathy (Primary)    2. Lumbar foraminal stenosis    3. Chronic bilateral low back pain, unspecified whether sciatica present    4. Degeneration of intervertebral disc of lumbar region with discogenic back pain and lower extremity pain      --- Zane Joshi reports a pain score of 9.  Given his pain assessment as noted, treatment options were discussed and the following options were decided upon as a follow-up plan to address the patient's pain: continuation of current treatment plan for pain and steroid injections.    --- Right L4 & L5 TF LESI (please evaluate painful levels under fluro)  ---  Indications for epidural injection:  Plan is to proceed with epidural at the appropriate level.  If the patient receives significant pain reduction and improvement in function and the plan will be to repeat the epidural when the pain worsens.  If a second epidural provides at least 6 weeks of sustained improvement that includes both pain reduction and improvement in function then an epidural injection could be repeated once again at the same level.  This is a mutual decision between the clinician and the patient that includes discussions including risks and benefits in detail as well as alternative therapies.   Patient's questions were answered to their satisfaction and to their understanding.  ---   --- Patient may also be a canidate for lumbar MBB/RFA due to complains of axial back pain and degenerative changes notes on his MRI  --- Offered referral to PT but patient declined.  --- Follow-up for procedure      JAYSON REPORT  As the clinician, I personally reviewed the JAYSON from 3/28/25 while the patient was in the office today.    Dictated utilizing Dragon dictation.

## 2025-04-16 ENCOUNTER — TELEPHONE (OUTPATIENT)
Dept: ONCOLOGY | Facility: CLINIC | Age: 65
End: 2025-04-16
Payer: COMMERCIAL

## 2025-04-16 NOTE — TELEPHONE ENCOUNTER
----- Message from Caren ODONNELL sent at 4/16/2025  2:47 PM EDT -----  Regarding: FW: missed us  @Yves Mejia r/s missed US appt as well as MD appt if needed  ----- Message -----  From: Shahana Casas  Sent: 4/16/2025   2:23 PM EDT  To: Caren Adler RN  Subject: missed us                                        Appt 4/22

## 2025-04-16 NOTE — TELEPHONE ENCOUNTER
CALLED AND SPOKE WITH THE PATIENT ABOUT HIS APPT DATE AND TIME FOR HIS US AND APPT WITH DR BRANDON PATIENT HAS CONFIRMED

## 2025-04-22 ENCOUNTER — PREP FOR SURGERY (OUTPATIENT)
Dept: OTHER | Facility: HOSPITAL | Age: 65
End: 2025-04-22
Payer: COMMERCIAL

## 2025-04-22 ENCOUNTER — HOSPITAL ENCOUNTER (OUTPATIENT)
Dept: ULTRASOUND IMAGING | Facility: HOSPITAL | Age: 65
Discharge: HOME | End: 2025-04-22
Admitting: INTERNAL MEDICINE
Payer: COMMERCIAL

## 2025-04-22 DIAGNOSIS — D69.6 THROMBOCYTOPENIA: ICD-10-CM

## 2025-04-22 DIAGNOSIS — K76.0 FATTY LIVER: ICD-10-CM

## 2025-04-22 DIAGNOSIS — M54.16 RIGHT LUMBAR RADICULOPATHY: Primary | ICD-10-CM

## 2025-04-22 PROCEDURE — 76705 ECHO EXAM OF ABDOMEN: CPT

## 2025-04-22 RX ORDER — IOPAMIDOL 408 MG/ML
12 INJECTION, SOLUTION INTRATHECAL
Status: CANCELLED | OUTPATIENT
Start: 2025-04-23

## 2025-04-22 RX ORDER — METHYLPREDNISOLONE ACETATE 80 MG/ML
80 INJECTION, SUSPENSION INTRA-ARTICULAR; INTRALESIONAL; INTRAMUSCULAR; SOFT TISSUE ONCE
Status: CANCELLED | OUTPATIENT
Start: 2025-04-23

## 2025-04-22 RX ORDER — BUPIVACAINE HYDROCHLORIDE 5 MG/ML
10 INJECTION, SOLUTION EPIDURAL; INTRACAUDAL; PERINEURAL ONCE
Status: CANCELLED | OUTPATIENT
Start: 2025-04-23

## 2025-04-22 RX ORDER — MIDAZOLAM HYDROCHLORIDE 1 MG/ML
2 INJECTION, SOLUTION INTRAMUSCULAR; INTRAVENOUS ONCE
Status: CANCELLED | OUTPATIENT
Start: 2025-04-23

## 2025-04-22 RX ORDER — FENTANYL CITRATE 50 UG/ML
50 INJECTION, SOLUTION INTRAMUSCULAR; INTRAVENOUS ONCE
Status: CANCELLED | OUTPATIENT
Start: 2025-04-23

## 2025-04-22 RX ORDER — LIDOCAINE HYDROCHLORIDE 10 MG/ML
5 INJECTION, SOLUTION INFILTRATION; PERINEURAL ONCE
Status: CANCELLED | OUTPATIENT
Start: 2025-04-23

## 2025-04-23 ENCOUNTER — HOSPITAL ENCOUNTER (OUTPATIENT)
Dept: GENERAL RADIOLOGY | Facility: HOSPITAL | Age: 65
Discharge: HOME | End: 2025-04-23
Payer: COMMERCIAL

## 2025-04-23 ENCOUNTER — HOSPITAL ENCOUNTER (OUTPATIENT)
Dept: PAIN MEDICINE | Facility: HOSPITAL | Age: 65
Discharge: HOME | End: 2025-04-23
Payer: COMMERCIAL

## 2025-04-23 VITALS
TEMPERATURE: 97.3 F | OXYGEN SATURATION: 98 % | SYSTOLIC BLOOD PRESSURE: 142 MMHG | DIASTOLIC BLOOD PRESSURE: 93 MMHG | RESPIRATION RATE: 18 BRPM | HEART RATE: 60 BPM

## 2025-04-23 DIAGNOSIS — G89.29 CHRONIC BILATERAL LOW BACK PAIN, UNSPECIFIED WHETHER SCIATICA PRESENT: ICD-10-CM

## 2025-04-23 DIAGNOSIS — M54.16 RIGHT LUMBAR RADICULOPATHY: ICD-10-CM

## 2025-04-23 DIAGNOSIS — M48.061 LUMBAR FORAMINAL STENOSIS: ICD-10-CM

## 2025-04-23 DIAGNOSIS — R52 PAIN: ICD-10-CM

## 2025-04-23 DIAGNOSIS — M54.50 CHRONIC BILATERAL LOW BACK PAIN, UNSPECIFIED WHETHER SCIATICA PRESENT: ICD-10-CM

## 2025-04-23 PROCEDURE — 77003 FLUOROGUIDE FOR SPINE INJECT: CPT

## 2025-04-23 PROCEDURE — 25010000002 BUPIVACAINE (PF) 0.5 % SOLUTION: Performed by: ANESTHESIOLOGY

## 2025-04-23 PROCEDURE — 25010000002 METHYLPREDNISOLONE PER 80 MG: Performed by: ANESTHESIOLOGY

## 2025-04-23 PROCEDURE — 25510000001 IOPAMIDOL 41 % SOLUTION: Performed by: ANESTHESIOLOGY

## 2025-04-23 RX ORDER — IOPAMIDOL 408 MG/ML
12 INJECTION, SOLUTION INTRATHECAL
Status: COMPLETED | OUTPATIENT
Start: 2025-04-23 | End: 2025-04-23

## 2025-04-23 RX ORDER — ASPIRIN 81 MG/1
81 TABLET ORAL DAILY
COMMUNITY

## 2025-04-23 RX ORDER — METHYLPREDNISOLONE ACETATE 80 MG/ML
80 INJECTION, SUSPENSION INTRA-ARTICULAR; INTRALESIONAL; INTRAMUSCULAR; SOFT TISSUE ONCE
Status: COMPLETED | OUTPATIENT
Start: 2025-04-23 | End: 2025-04-23

## 2025-04-23 RX ORDER — MIDAZOLAM HYDROCHLORIDE 1 MG/ML
2 INJECTION, SOLUTION INTRAMUSCULAR; INTRAVENOUS ONCE
Status: DISCONTINUED | OUTPATIENT
Start: 2025-04-23 | End: 2025-04-24 | Stop reason: HOSPADM

## 2025-04-23 RX ORDER — FENTANYL CITRATE 50 UG/ML
50 INJECTION, SOLUTION INTRAMUSCULAR; INTRAVENOUS ONCE
Status: DISCONTINUED | OUTPATIENT
Start: 2025-04-23 | End: 2025-04-24 | Stop reason: HOSPADM

## 2025-04-23 RX ORDER — BUPIVACAINE HYDROCHLORIDE 5 MG/ML
10 INJECTION, SOLUTION EPIDURAL; INTRACAUDAL; PERINEURAL ONCE
Status: COMPLETED | OUTPATIENT
Start: 2025-04-23 | End: 2025-04-23

## 2025-04-23 RX ORDER — LIDOCAINE HYDROCHLORIDE 10 MG/ML
5 INJECTION, SOLUTION INFILTRATION; PERINEURAL ONCE
Status: DISCONTINUED | OUTPATIENT
Start: 2025-04-23 | End: 2025-04-24 | Stop reason: HOSPADM

## 2025-04-23 RX ADMIN — IOPAMIDOL 10 ML: 408 INJECTION, SOLUTION INTRATHECAL at 11:06

## 2025-04-23 RX ADMIN — BUPIVACAINE HYDROCHLORIDE 10 ML: 5 INJECTION, SOLUTION EPIDURAL; INTRACAUDAL; PERINEURAL at 11:06

## 2025-04-23 RX ADMIN — METHYLPREDNISOLONE ACETATE 80 MG: 80 INJECTION, SUSPENSION INTRA-ARTICULAR; INTRALESIONAL; INTRAMUSCULAR; SOFT TISSUE at 11:06

## 2025-04-23 NOTE — DISCHARGE INSTRUCTIONS
[facility] - Post-procedure Instructions          --  While there are no absolute restrictions, it is recommended that you do not perform strenuous activity today. In the morning, you may resume your level of activity as before your block.    --  If you have a band-aid at your injection site, please remove it later today. Observe the area for any redness, swelling, pus-like drainage, or a temperature over 101°. If any of these symptoms occur, please call your doctor at [phone]. If after office hours, leave a message and the on-call provider will return your call.    --  Ice may be applied to your injection site. It is recommended you avoid direct heat (heating pad; hot tub) for 1-2 days.    --  Call [facility] at [phone] if you experience persistent headache, persistent bleeding from the injection site, or severe pain not relieved by heat or oral medication.    --  Do not make important decisions today.    --  Due to the effects of the block and/or the I.V. Sedation, DO NOT drive or operate hazardous machinery for 12 hours.  Local anesthetics may cause numbness after procedure and precautions must be taken with regards to operating equipment as well as with walking, even if ambulating with assistance of another person or with an assistive device.    --  Do not drink alcohol for 12 hours.    -- You may return to work tomorrow, or as directed by your referring doctor.    --  Occasionally you may notice a slight increase in your pain after the procedure. This should start to improve within the next 24-48 hours. Radiofrequency ablation procedure pain may last 3-4 weeks.    --  It may take as long as 3-4 days before you notice a gradual improvement in your pain and/or other symptoms.    -- You may continue to take your prescribed pain medication as needed.    --  Some normal possible side effects of steroid use could include fluid retention, increased blood sugar, dull headache,  increased sweating, increased appetite, mood swings and flushing.    --  Diabetics are recommended to watch their blood glucose level closely for 24-48 hours after the injection.    --  Must stay in PACU for 20 min upon arrival and prove no leg weakness before being discharged.    --  IN THE EVENT OF A LIFE THREATENING EMERGENCY, (CHEST PAIN, BREATHING DIFFICULTIES, PARALYSIS…) YOU SHOULD GO TO YOUR NEAREST EMERGENCY ROOM.    --  You should be contacted by our office within 2-3 days to schedule follow up or next appointment date.  If not contacted within 7 days, please call the office at [phone]

## 2025-04-23 NOTE — INTERVAL H&P NOTE
H&P reviewed. The patient was examined and there are no changes to the H&P.    Mallampati:   III (soft and hard palate and base of uvula visible)    ASA Physical Status Classification: ASA 2 - Patient with mild systemic disease with no functional limitations    Preprocedure/preassessment plan is as noted.

## 2025-04-23 NOTE — PROCEDURES
Procedures    Lumbar Transforaminal Epidural Steroid Injection (two levels)  [facility]      PREOPERATIVE DIAGNOSIS:  right Lumbar Radiculopathy    POSTOPERATIVE DIAGNOSIS:  Same as preop diagnosis    PROCEDURE:    1. CPT 04949 --  Diagnostic Transforaminal Epidural Steroid Injection at the L4 level, on the right   2. CPT 06930 --  Diagnostic Transforaminal Epidural Steroid Injection at the L5 level, on the right     PRE-PROCEDURE DISCUSSION WITH PATIENT:    Risks and complications were discussed with the patient prior to starting the procedure and informed consent was obtained.  We discussed various topics including but not limited to bleeding, infection, injury, nerve injury, paralysis, coma, death, postprocedural painful flare-up, postprocedural site soreness, and a lack of pain relief.  We discussed the diagnostic aspect of transforaminal epidural / selective nerve root blockade.    Preprocedure assessment/presedation assessment is noted as the H&P/H&P update as part of this Epic chart encounter.  Preassessment plan and preprocedure airway assessment are noted.  Immediate in the room airway assessment is unchanged from the preprocedure assessment performed in the preoperative area a few minutes ago.      SURGEON:  Kamaljit Cade MD    REASON FOR PROCEDURE:    Degenerative changes are noted in the area. and Radiating pattern of pain is likely consistent with degenerative changes in the area.    SEDATION:  Patient declined administration of moderate sedation    ANESTHETIC:  Marcaine 0.25%  STEROID:  Methylprednisolone (DEPO MEDROL) 80mg/ml    DESCRIPTON OF PROCEDURE:  After obtaining informed consent, an I.V. was not started in the preoperative area. The patient taken to the operating room and was placed in the prone position with a pillow under the abdomen.  All pressure points were well padded.  EKG, blood pressure, and pulse oximeter were monitored.  The lumbar area was prepped with Chloraprep  and draped in a sterile fashion. Under fluoroscopic guidance in an oblique dimension on the above mentioned side, the transverse process of the first aforementioned vertebra at the junction of the body at 6 o'clock position was identified. Skin and subcutaneous tissue was anesthetized with 1% lidocaine. A 22-gauge spinal needle was introduced under fluoroscopic guidance at the above junction into the foramen without parasthesias and into the epidural space. After confirming the position of the needle with PA, lateral, and oblique fluoroscopic views, aspiration was checked and was clear of blood or CSF.  Next, 1 mL of Omnipaque was injected. After seeing adequate spread on the corresponding nerve root, a total volume 2mL of injectate containing local anesthetic as above and half of the above mentioned corticosteroid was injected into the epidural space.  The needle was removed intact.      Next, in similar fashion, the second level mentioned above was addressed and a similar amount of injectate was delivered after similar imaging was achieved.      Vital signs were stable throughout.          ESTIMATED BLOOD LOSS:  <5 mL  SPECIMENS:  none    COMPLICATIONS:   No complications were noted., There was no indication of vascular uptake on live injection of contrast dye., There was no indication of intrathecal uptake on live injection of contrast dye., There was not any evidence of dural puncture.  , and The patient did not have any signs of postprocedure numbness nor weakness.    TOLERANCE & DISCHARGE CONDITION:    The patient tolerated the procedure well.  The patient was transported to the recovery area without difficulties.  The patient was discharged to home under the care of family in stable and satisfactory condition.    PLAN OF CARE:  The patient was given our standard instruction sheet.  The patient will Return to clinic 4-6 wks.  OF NOTE, the injection at L45 foramen was quite technically difficult due to  degenerative change and ossification.  There was an appropriate Left L4 neurogram but less was making it into the foramen so the epidurogram while present was suboptimal.  If considering procedures in the future, I would consider trialing an interlaminar epidural rather than a selective root blockade for therapeutic purpose.  The patient will resume all medications as per the medication reconciliation sheet.

## 2025-04-25 ENCOUNTER — OFFICE VISIT (OUTPATIENT)
Dept: ONCOLOGY | Facility: CLINIC | Age: 65
End: 2025-04-25
Payer: COMMERCIAL

## 2025-04-25 ENCOUNTER — LAB (OUTPATIENT)
Dept: LAB | Facility: HOSPITAL | Age: 65
End: 2025-04-25
Payer: COMMERCIAL

## 2025-04-25 VITALS
OXYGEN SATURATION: 96 % | TEMPERATURE: 98.3 F | HEART RATE: 80 BPM | BODY MASS INDEX: 31.29 KG/M2 | WEIGHT: 176.6 LBS | HEIGHT: 63 IN | DIASTOLIC BLOOD PRESSURE: 81 MMHG | SYSTOLIC BLOOD PRESSURE: 132 MMHG | RESPIRATION RATE: 17 BRPM

## 2025-04-25 DIAGNOSIS — K76.0 FATTY LIVER: ICD-10-CM

## 2025-04-25 DIAGNOSIS — D69.6 THROMBOCYTOPENIA: ICD-10-CM

## 2025-04-25 LAB
ALBUMIN SERPL-MCNC: 4.4 G/DL (ref 3.5–5.2)
ALBUMIN/GLOB SERPL: 1.5 G/DL
ALP SERPL-CCNC: 94 U/L (ref 39–117)
ALT SERPL W P-5'-P-CCNC: 26 U/L (ref 1–41)
ANION GAP SERPL CALCULATED.3IONS-SCNC: 13.7 MMOL/L (ref 5–15)
AST SERPL-CCNC: 21 U/L (ref 1–40)
BASOPHILS # BLD AUTO: 0.03 10*3/MM3 (ref 0–0.2)
BASOPHILS NFR BLD AUTO: 0.5 % (ref 0–1.5)
BILIRUB SERPL-MCNC: 0.9 MG/DL (ref 0–1.2)
BUN SERPL-MCNC: 21 MG/DL (ref 8–23)
BUN/CREAT SERPL: 20.6 (ref 7–25)
CALCIUM SPEC-SCNC: 9.3 MG/DL (ref 8.6–10.5)
CHLORIDE SERPL-SCNC: 100 MMOL/L (ref 98–107)
CO2 SERPL-SCNC: 23.3 MMOL/L (ref 22–29)
CREAT SERPL-MCNC: 1.02 MG/DL (ref 0.76–1.27)
DEPRECATED RDW RBC AUTO: 42.2 FL (ref 37–54)
EGFRCR SERPLBLD CKD-EPI 2021: 82.6 ML/MIN/1.73
EOSINOPHIL # BLD AUTO: 0.03 10*3/MM3 (ref 0–0.4)
EOSINOPHIL NFR BLD AUTO: 0.5 % (ref 0.3–6.2)
ERYTHROCYTE [DISTWIDTH] IN BLOOD BY AUTOMATED COUNT: 13 % (ref 12.3–15.4)
GLOBULIN UR ELPH-MCNC: 2.9 GM/DL
GLUCOSE SERPL-MCNC: 226 MG/DL (ref 65–99)
HCT VFR BLD AUTO: 46.8 % (ref 37.5–51)
HGB BLD-MCNC: 15.8 G/DL (ref 13–17.7)
IMM GRANULOCYTES # BLD AUTO: 0.01 10*3/MM3 (ref 0–0.05)
IMM GRANULOCYTES NFR BLD AUTO: 0.2 % (ref 0–0.5)
LYMPHOCYTES # BLD AUTO: 1.42 10*3/MM3 (ref 0.7–3.1)
LYMPHOCYTES NFR BLD AUTO: 22.8 % (ref 19.6–45.3)
MCH RBC QN AUTO: 30.2 PG (ref 26.6–33)
MCHC RBC AUTO-ENTMCNC: 33.8 G/DL (ref 31.5–35.7)
MCV RBC AUTO: 89.5 FL (ref 79–97)
MONOCYTES # BLD AUTO: 0.42 10*3/MM3 (ref 0.1–0.9)
MONOCYTES NFR BLD AUTO: 6.7 % (ref 5–12)
NEUTROPHILS NFR BLD AUTO: 4.32 10*3/MM3 (ref 1.7–7)
NEUTROPHILS NFR BLD AUTO: 69.3 % (ref 42.7–76)
NRBC BLD AUTO-RTO: 0 /100 WBC (ref 0–0.2)
PLATELET # BLD AUTO: 152 10*3/MM3 (ref 140–450)
PMV BLD AUTO: 10 FL (ref 6–12)
POTASSIUM SERPL-SCNC: 4.2 MMOL/L (ref 3.5–5.2)
PROT SERPL-MCNC: 7.3 G/DL (ref 6–8.5)
RBC # BLD AUTO: 5.23 10*6/MM3 (ref 4.14–5.8)
SODIUM SERPL-SCNC: 137 MMOL/L (ref 136–145)
WBC NRBC COR # BLD AUTO: 6.23 10*3/MM3 (ref 3.4–10.8)

## 2025-04-25 PROCEDURE — 36415 COLL VENOUS BLD VENIPUNCTURE: CPT

## 2025-04-25 PROCEDURE — 80053 COMPREHEN METABOLIC PANEL: CPT

## 2025-04-25 PROCEDURE — 85025 COMPLETE CBC W/AUTO DIFF WBC: CPT

## 2025-04-25 NOTE — PROGRESS NOTES
[facility]    CONSULTING IN BLOOD DISORDERS & CANCER      REASON FOR CONSULTATION/CHIEF COMPLAINT:     Evaluation and management for thrombocytopenia                             REQUESTING PHYSICIAN: No ref. provider found  RECORDS OBTAINED:  Records of the patients history including those from the electronic medical record were reviewed and summarized in detail.    HISTORY OF PRESENT ILLNESS:    The patient is a 64 y.o. year old male with medical history significant for DM 2, CAD, hypertension and dyslipidemia comes for thrombocytopenia evaluation and management.    Patient had a routine CBC performed through his PCP  in July 2024 which showed low platelet count of 130,000.  Rest of the CBC was within normal range.  On chart review, patient has had low normal platelet count in 109-138,000 range at least since 2016.  Patient denies any bleeding or easy bruises.    A CT A/P performed in July 2023 had noted liver enlargement, measuring 17.4 cm along with hepatic steatosis changes.  Patient reports of drinking alcohol 3-4 days each week, has been doing it for many years.    He has been referred to hematology for further evaluation and management.    INTERIM HISTORY:  Patient returns to the clinic for a follow-up visit.  Denies any new complaints since last visit.  States he continues to work as a .  He is on his feet most of the day.  His diet or activity has not changed in last few months.  Although, he is not eating as much as before.  Recent US abdomen shows persistent mild hepatic steatosis.  Past Medical History:   Diagnosis Date    Arthritis     Chest pain     Colon polyps 12/15/2016    Rectum: fragments of hyperplastic polyp, colon at 60cm: fragments of hyperplastic polyp    Coronary artery disease     Diabetes mellitus     Gout     Hernia, inguinal     Hyperlipidemia     Hypertension     no meds at present    NSTEMI (non-ST elevated myocardial infarction) 10/2019    Umbilical  hernia      Past Surgical History:   Procedure Laterality Date    CARDIAC CATHETERIZATION N/A 10/24/2019    Procedure: Left Heart Cath;  Surgeon: Ayesha Bagley MD;  Location: [facility] CATH INVASIVE LOCATION;  Service: Cardiology    CARDIAC CATHETERIZATION N/A 10/24/2019    Procedure: Coronary angiography;  Surgeon: Ayesha Bagley MD;  Location: [facility] CATH INVASIVE LOCATION;  Service: Cardiology    CARDIAC CATHETERIZATION N/A 10/24/2019    Procedure: Left ventriculography;  Surgeon: Ayesha Bagley MD;  Location: [facility] CATH INVASIVE LOCATION;  Service: Cardiology    COLONOSCOPY N/A 12/15/2016    Procedure: COLONOSCOPY TO CECUM AND TI WITH POLYPECTOMY COLD SNARE;  Surgeon: Chris Means MD;  Location: [facility] ENDOSCOPY;  Service:     EPIDURAL BLOCK      EXTERNAL FIXATION ANKLE FRACTURE Left 03/23/2004    Debridement and irrigation, with implantation of antiobiotic beads, with placement of external fixator-Dr. Brandon Stanford, [facility]    HARDWARE REMOVAL FOOT / ANKLE Left 06/04/2004    Removal of syndesmotic screw, deep implant-Dr. Brandon Stanford, [facility]    INGUINAL HERNIA REPAIR Right 08/12/2022    Procedure: INGUINAL HERNIA REPAIR LAPAROSCOPIC RIGHT;  Surgeon: Glynn Pressley MD;  Location: [facility] OR [facility];  Service: General;  Laterality: Right;    UMBILICAL HERNIA REPAIR N/A 08/12/2022    Procedure: OPEN UMBILICAL HERNIA REPAIR;  Surgeon: Glynn Pressley MD;  Location: [facility] OR [facility];  Service: General;  Laterality: N/A;       MEDICATIONS    Current Outpatient Medications:     amLODIPine (NORVASC) 5 MG tablet, Take 1 tablet by mouth Daily., Disp: 30 tablet, Rfl: 11    aspirin 81 MG EC tablet, Take 1 tablet by mouth Daily., Disp: , Rfl:     atorvastatin (Lipitor) 20 MG tablet, Take 1 tablet by mouth nightly, Disp: 90 tablet, Rfl: 3    empagliflozin (Jardiance) 25 MG tablet tablet, Take 1 tablet by mouth Daily., Disp: 90 tablet, Rfl: 3    Icosapent Ethyl (Vascepa) 0.5 g capsule, Take 4 capsules by mouth 2 (Two) Times a Day., Disp: 720  "capsule, Rfl: 3    Loratadine 10 MG capsule, Take 1 capsule by mouth Daily As Needed., Disp: , Rfl:     losartan (Cozaar) 50 MG tablet, Take 1 tablet by mouth Daily., Disp: 90 tablet, Rfl: 3    metFORMIN ER (GLUCOPHAGE-XR) 500 MG 24 hr tablet, Take 2 tablets by mouth 2 (Two) Times a Day., Disp: 360 tablet, Rfl: 3    SITagliptin (Januvia) 100 MG tablet, Take 1 tablet by mouth Daily., Disp: 90 tablet, Rfl: 1    tadalafil (Cialis) 20 MG tablet, Take 1 tablet by mouth Daily As Needed for Erectile Dysfunction., Disp: 30 tablet, Rfl: 2    ALLERGIES:     Allergies   Allergen Reactions    Semaglutide Diarrhea       SOCIAL HISTORY:       Social History     Socioeconomic History    Marital status:    Tobacco Use    Smoking status: Never    Smokeless tobacco: Never   Vaping Use    Vaping status: Never Used   Substance and Sexual Activity    Alcohol use: Yes     Alcohol/week: 4.0 standard drinks of alcohol     Types: 4 Shots of liquor per week     Comment: 2 drinks daily bourbons    Drug use: Never    Sexual activity: Defer         FAMILY HISTORY:  Family History   Problem Relation Age of Onset    Kidney disease Mother     Kidney disease Father     Breast cancer Sister     Diabetes Brother     Lymphoma Brother     Sudden death Brother 55        cancer    Malig Hyperthermia Neg Hx        REVIEW OF SYSTEMS:  As per HPI       Vitals:    04/25/25 0928   BP: 132/81   Pulse: 80   Resp: 17   Temp: 98.3 °F (36.8 °C)   TempSrc: Oral   SpO2: 96%   Weight: 80.1 kg (176 lb 9.6 oz)   Height: 160 cm (62.99\")   PainSc: 0-No pain         4/25/2025     9:31 AM   Current Status   ECOG score 0      PHYSICAL EXAM:    CONSTITUTIONAL:  Vital signs reviewed.  No distress, looks comfortable.  EYES:  Conjunctiva and lids unremarkable.   EARS,NOSE,MOUTH,THROAT:  Ears and nose appear unremarkable.  Lips, teeth, gums appear unremarkable.  RESPIRATORY:  Normal respiratory effort.  Lungs clear to auscultation bilaterally.  CARDIOVASCULAR:  Normal S1, " S2.  No murmurs rubs or gallops.  No significant lower extremity edema.  GASTROINTESTINAL: Abdomen appears unremarkable.  Nondistended  LYMPHATIC:  No cervical, supraclavicular lymphadenopathy.  NEURO: AAOx3, no focal deficits.  Appears to have equal strength all 4 extremities.  MUSCULOSKELETAL:  Unremarkable digits/nails.  No cyanosis or clubbing.  No apparent joint deformities.  SKIN:  Warm.  No rashes.  PSYCHIATRIC:  Normal judgment and insight.  Normal mood and affect.     RECENT LABS:        Lab on 04/25/2025   Component Date Value Ref Range Status    Glucose 04/25/2025 226 (H)  65 - 99 mg/dL Final    BUN 04/25/2025 21  8 - 23 mg/dL Final    Creatinine 04/25/2025 1.02  0.76 - 1.27 mg/dL Final    Sodium 04/25/2025 137  136 - 145 mmol/L Final    Potassium 04/25/2025 4.2  3.5 - 5.2 mmol/L Final    Chloride 04/25/2025 100  98 - 107 mmol/L Final    CO2 04/25/2025 23.3  22.0 - 29.0 mmol/L Final    Calcium 04/25/2025 9.3  8.6 - 10.5 mg/dL Final    Total Protein 04/25/2025 7.3  6.0 - 8.5 g/dL Final    Albumin 04/25/2025 4.4  3.5 - 5.2 g/dL Final    ALT (SGPT) 04/25/2025 26  1 - 41 U/L Final    AST (SGOT) 04/25/2025 21  1 - 40 U/L Final    Alkaline Phosphatase 04/25/2025 94  39 - 117 U/L Final    Total Bilirubin 04/25/2025 0.9  0.0 - 1.2 mg/dL Final    Globulin 04/25/2025 2.9  gm/dL Final    A/G Ratio 04/25/2025 1.5  g/dL Final    BUN/Creatinine Ratio 04/25/2025 20.6  7.0 - 25.0 Final    Anion Gap 04/25/2025 13.7  5.0 - 15.0 mmol/L Final    eGFR 04/25/2025 82.6  >60.0 mL/min/1.73 Final    WBC 04/25/2025 6.23  3.40 - 10.80 10*3/mm3 Final    RBC 04/25/2025 5.23  4.14 - 5.80 10*6/mm3 Final    Hemoglobin 04/25/2025 15.8  13.0 - 17.7 g/dL Final    Hematocrit 04/25/2025 46.8  37.5 - 51.0 % Final    MCV 04/25/2025 89.5  79.0 - 97.0 fL Final    MCH 04/25/2025 30.2  26.6 - 33.0 pg Final    MCHC 04/25/2025 33.8  31.5 - 35.7 g/dL Final    RDW 04/25/2025 13.0  12.3 - 15.4 % Final    RDW-SD 04/25/2025 42.2  37.0 - 54.0 fl Final     MPV 04/25/2025 10.0  6.0 - 12.0 fL Final    Platelets 04/25/2025 152  140 - 450 10*3/mm3 Final    Neutrophil % 04/25/2025 69.3  42.7 - 76.0 % Final    Lymphocyte % 04/25/2025 22.8  19.6 - 45.3 % Final    Monocyte % 04/25/2025 6.7  5.0 - 12.0 % Final    Eosinophil % 04/25/2025 0.5  0.3 - 6.2 % Final    Basophil % 04/25/2025 0.5  0.0 - 1.5 % Final    Immature Grans % 04/25/2025 0.2  0.0 - 0.5 % Final    Neutrophils, Absolute 04/25/2025 4.32  1.70 - 7.00 10*3/mm3 Final    Lymphocytes, Absolute 04/25/2025 1.42  0.70 - 3.10 10*3/mm3 Final    Monocytes, Absolute 04/25/2025 0.42  0.10 - 0.90 10*3/mm3 Final    Eosinophils, Absolute 04/25/2025 0.03  0.00 - 0.40 10*3/mm3 Final    Basophils, Absolute 04/25/2025 0.03  0.00 - 0.20 10*3/mm3 Final    Immature Grans, Absolute 04/25/2025 0.01  0.00 - 0.05 10*3/mm3 Final    nRBC 04/25/2025 0.0  0.0 - 0.2 /100 WBC Final       ASSESSMENT:  Patient is a pleasant 64-year-old male with medical history significant for DM 2, CAD, hypertension and dyslipidemia comes for thrombocytopenia evaluation and management.    # Thrombocytopenia:  Patient had a routine CBC performed through his PCP  in July 2024 which showed low platelet count of 130,000.  Rest of the CBC was within normal range.    On chart review, patient has had low normal platelet count in 109-138,000 range at least since 2016.  Patient denies any bleeding or easy bruises.  A CT A/P performed in July 2023 had noted liver enlargement, measuring 17.4 cm along with hepatic steatosis changes.  Patient reports of drinking alcohol 3-4 days each week, has been doing it for many years.  He also has history of DM2, not very well-controlled (A1c 7.3 in July 2024).  Informed patient that the mild and stable thrombocytopenia is likely due to underlying hepatomegaly/fatty liver disease.  Repeat CBC from today shows improvement in platelet count to 151,000.  Will defer further workup at this time.  Advised patient to stop alcohol use  and adopt healthy lifestyle including regular exercise.  Advised to maintain adequate diabetes control.  4/25/2025: Patient is clinically stable, although he has lost around 10 pounds since last visit.  He states he is on his feet most of the day while his work as a .  He is not eating as much as before as well.  Recent US abdomen shows persistent hepatic steatosis, although CBC shows normalization in platelet count to 152,000.  Advised patient to maintain active lifestyle and healthy dietary habits.  I will repeat his CBC again in 1 years time.  Patient to call us with any questions or concerns    # DM 2: Per PCP    # CAD: Per PCP/cardiology    PLAN:   -Mild stable thrombocytopenia likely due to hepatomegaly/fatty liver disease  -Advised to stop alcohol use and maintain healthy lifestyle  -Follow-up in 1 year or sooner if needed  Orders Placed This Encounter   Procedures    Comprehensive Metabolic Panel     Standing Status:   Future     Expected Date:   4/23/2026     Expiration Date:   7/25/2026     Release to patient:   Routine Release [[id number]]    CBC & Differential     Standing Status:   Future     Expected Date:   4/23/2026     Expiration Date:   7/25/2026     Manual Differential:   No     Release to patient:   Routine Release [[id number]]   Total time spent during this patient encounter is 41 minutes. The total time spent with the patient includes: preparing to see the patient by reviewing of tests, prior notes or other relevant information, performing appropriate independent examination & evaluation, counseling, ordering of medications, tests or procedures, documenting clinic information in the electronic medical records or other health records, independently interpreting results of tests and communicating the results to the patient/family or caregiver.

## 2025-05-20 ENCOUNTER — OFFICE VISIT (OUTPATIENT)
Dept: INTERNAL MEDICINE | Facility: CLINIC | Age: 65
End: 2025-05-20
Payer: COMMERCIAL

## 2025-05-20 ENCOUNTER — LAB (OUTPATIENT)
Dept: LAB | Facility: HOSPITAL | Age: 65
End: 2025-05-20
Payer: COMMERCIAL

## 2025-05-20 VITALS
SYSTOLIC BLOOD PRESSURE: 148 MMHG | BODY MASS INDEX: 31.54 KG/M2 | HEIGHT: 63 IN | HEART RATE: 68 BPM | RESPIRATION RATE: 14 BRPM | DIASTOLIC BLOOD PRESSURE: 82 MMHG | OXYGEN SATURATION: 96 % | WEIGHT: 178 LBS

## 2025-05-20 DIAGNOSIS — N52.9 ERECTILE DYSFUNCTION, UNSPECIFIED ERECTILE DYSFUNCTION TYPE: ICD-10-CM

## 2025-05-20 DIAGNOSIS — E11.65 TYPE 2 DIABETES MELLITUS WITH HYPERGLYCEMIA, WITHOUT LONG-TERM CURRENT USE OF INSULIN: ICD-10-CM

## 2025-05-20 DIAGNOSIS — E78.5 HYPERLIPIDEMIA, UNSPECIFIED HYPERLIPIDEMIA TYPE: ICD-10-CM

## 2025-05-20 DIAGNOSIS — I10 ESSENTIAL HYPERTENSION: ICD-10-CM

## 2025-05-20 LAB
ALBUMIN SERPL-MCNC: 4.1 G/DL (ref 3.5–5.2)
ALBUMIN UR-MCNC: 1.8 MG/DL
ALBUMIN/GLOB SERPL: 1.5 G/DL
ALP SERPL-CCNC: 83 U/L (ref 39–117)
ALT SERPL W P-5'-P-CCNC: 26 U/L (ref 1–41)
ANION GAP SERPL CALCULATED.3IONS-SCNC: 7.6 MMOL/L (ref 5–15)
AST SERPL-CCNC: 23 U/L (ref 1–40)
BASOPHILS # BLD AUTO: 0.03 10*3/MM3 (ref 0–0.2)
BASOPHILS NFR BLD AUTO: 0.8 % (ref 0–1.5)
BILIRUB SERPL-MCNC: 0.6 MG/DL (ref 0–1.2)
BUN SERPL-MCNC: 15 MG/DL (ref 8–23)
BUN/CREAT SERPL: 15.6 (ref 7–25)
CALCIUM SPEC-SCNC: 9 MG/DL (ref 8.6–10.5)
CHLORIDE SERPL-SCNC: 106 MMOL/L (ref 98–107)
CHOLEST SERPL-MCNC: 157 MG/DL (ref 0–200)
CO2 SERPL-SCNC: 25.4 MMOL/L (ref 22–29)
CREAT SERPL-MCNC: 0.96 MG/DL (ref 0.76–1.27)
CREAT UR-MCNC: 92.9 MG/DL
DEPRECATED RDW RBC AUTO: 46.1 FL (ref 37–54)
EGFRCR SERPLBLD CKD-EPI 2021: 88.8 ML/MIN/1.73
EOSINOPHIL # BLD AUTO: 0.08 10*3/MM3 (ref 0–0.4)
EOSINOPHIL NFR BLD AUTO: 2.2 % (ref 0.3–6.2)
ERYTHROCYTE [DISTWIDTH] IN BLOOD BY AUTOMATED COUNT: 13.7 % (ref 12.3–15.4)
GLOBULIN UR ELPH-MCNC: 2.7 GM/DL
GLUCOSE SERPL-MCNC: 138 MG/DL (ref 65–99)
HBA1C MFR BLD: 7.3 % (ref 4.8–5.6)
HCT VFR BLD AUTO: 46.1 % (ref 37.5–51)
HDLC SERPL-MCNC: 36 MG/DL (ref 40–60)
HGB BLD-MCNC: 15.3 G/DL (ref 13–17.7)
IMM GRANULOCYTES # BLD AUTO: 0.01 10*3/MM3 (ref 0–0.05)
IMM GRANULOCYTES NFR BLD AUTO: 0.3 % (ref 0–0.5)
LDLC SERPL CALC-MCNC: 97 MG/DL (ref 0–100)
LDLC/HDLC SERPL: 2.6 {RATIO}
LYMPHOCYTES # BLD AUTO: 1.21 10*3/MM3 (ref 0.7–3.1)
LYMPHOCYTES NFR BLD AUTO: 33.1 % (ref 19.6–45.3)
MCH RBC QN AUTO: 30.4 PG (ref 26.6–33)
MCHC RBC AUTO-ENTMCNC: 33.2 G/DL (ref 31.5–35.7)
MCV RBC AUTO: 91.7 FL (ref 79–97)
MICROALBUMIN/CREAT UR: 19.4 MG/G (ref 0–29)
MONOCYTES # BLD AUTO: 0.38 10*3/MM3 (ref 0.1–0.9)
MONOCYTES NFR BLD AUTO: 10.4 % (ref 5–12)
NEUTROPHILS NFR BLD AUTO: 1.95 10*3/MM3 (ref 1.7–7)
NEUTROPHILS NFR BLD AUTO: 53.2 % (ref 42.7–76)
NRBC BLD AUTO-RTO: 0 /100 WBC (ref 0–0.2)
PLATELET # BLD AUTO: 125 10*3/MM3 (ref 140–450)
PMV BLD AUTO: 10.2 FL (ref 6–12)
POTASSIUM SERPL-SCNC: 4 MMOL/L (ref 3.5–5.2)
PROT SERPL-MCNC: 6.8 G/DL (ref 6–8.5)
RBC # BLD AUTO: 5.03 10*6/MM3 (ref 4.14–5.8)
SODIUM SERPL-SCNC: 139 MMOL/L (ref 136–145)
T-UPTAKE NFR SERPL: 1.02 TBI (ref 0.8–1.3)
T4 SERPL-MCNC: 7.23 MCG/DL (ref 4.5–11.7)
TRIGL SERPL-MCNC: 137 MG/DL (ref 0–150)
TSH SERPL DL<=0.05 MIU/L-ACNC: 1.76 UIU/ML (ref 0.27–4.2)
VLDLC SERPL-MCNC: 24 MG/DL (ref 5–40)
WBC NRBC COR # BLD AUTO: 3.66 10*3/MM3 (ref 3.4–10.8)

## 2025-05-20 PROCEDURE — 83036 HEMOGLOBIN GLYCOSYLATED A1C: CPT | Performed by: FAMILY MEDICINE

## 2025-05-20 PROCEDURE — 99214 OFFICE O/P EST MOD 30 MIN: CPT | Performed by: FAMILY MEDICINE

## 2025-05-20 PROCEDURE — 36415 COLL VENOUS BLD VENIPUNCTURE: CPT | Performed by: FAMILY MEDICINE

## 2025-05-20 PROCEDURE — 84436 ASSAY OF TOTAL THYROXINE: CPT | Performed by: FAMILY MEDICINE

## 2025-05-20 PROCEDURE — 80050 GENERAL HEALTH PANEL: CPT | Performed by: FAMILY MEDICINE

## 2025-05-20 PROCEDURE — 84479 ASSAY OF THYROID (T3 OR T4): CPT | Performed by: FAMILY MEDICINE

## 2025-05-20 PROCEDURE — 82043 UR ALBUMIN QUANTITATIVE: CPT | Performed by: FAMILY MEDICINE

## 2025-05-20 PROCEDURE — 82570 ASSAY OF URINE CREATININE: CPT | Performed by: FAMILY MEDICINE

## 2025-05-20 PROCEDURE — 80061 LIPID PANEL: CPT | Performed by: FAMILY MEDICINE

## 2025-05-20 RX ORDER — LOSARTAN POTASSIUM 50 MG/1
50 TABLET ORAL DAILY
Qty: 90 TABLET | Refills: 3 | Status: SHIPPED | OUTPATIENT
Start: 2025-05-20

## 2025-05-20 RX ORDER — ICOSAPENT ETHYL 0.5 G/1
4 CAPSULE ORAL 2 TIMES DAILY
Qty: 720 CAPSULE | Refills: 3 | Status: SHIPPED | OUTPATIENT
Start: 2025-05-20

## 2025-05-20 RX ORDER — TADALAFIL 20 MG/1
20 TABLET ORAL DAILY PRN
Qty: 30 TABLET | Refills: 2 | Status: SHIPPED | OUTPATIENT
Start: 2025-05-20

## 2025-05-20 RX ORDER — AMLODIPINE BESYLATE 5 MG/1
5 TABLET ORAL DAILY
Qty: 90 TABLET | Refills: 3 | Status: SHIPPED | OUTPATIENT
Start: 2025-05-20

## 2025-05-20 RX ORDER — ATORVASTATIN CALCIUM 20 MG/1
20 TABLET, FILM COATED ORAL DAILY
Qty: 90 TABLET | Refills: 3 | Status: SHIPPED | OUTPATIENT
Start: 2025-05-20

## 2025-05-20 RX ORDER — METFORMIN HYDROCHLORIDE 500 MG/1
1000 TABLET, EXTENDED RELEASE ORAL 2 TIMES DAILY
Qty: 360 TABLET | Refills: 3 | Status: SHIPPED | OUTPATIENT
Start: 2025-05-20

## 2025-05-22 ENCOUNTER — OFFICE VISIT (OUTPATIENT)
Dept: PAIN MEDICINE | Facility: CLINIC | Age: 65
End: 2025-05-22
Payer: COMMERCIAL

## 2025-05-22 ENCOUNTER — PREP FOR SURGERY (OUTPATIENT)
Dept: OTHER | Facility: HOSPITAL | Age: 65
End: 2025-05-22
Payer: COMMERCIAL

## 2025-05-22 VITALS
TEMPERATURE: 96.4 F | OXYGEN SATURATION: 97 % | HEART RATE: 72 BPM | SYSTOLIC BLOOD PRESSURE: 138 MMHG | HEIGHT: 63 IN | DIASTOLIC BLOOD PRESSURE: 92 MMHG | BODY MASS INDEX: 31.79 KG/M2 | WEIGHT: 179.4 LBS

## 2025-05-22 DIAGNOSIS — M54.16 RIGHT LUMBAR RADICULOPATHY: Primary | ICD-10-CM

## 2025-05-22 DIAGNOSIS — M48.061 LUMBAR FORAMINAL STENOSIS: Primary | ICD-10-CM

## 2025-05-22 DIAGNOSIS — M51.362 DEGENERATION OF INTERVERTEBRAL DISC OF LUMBAR REGION WITH DISCOGENIC BACK PAIN AND LOWER EXTREMITY PAIN: ICD-10-CM

## 2025-05-22 DIAGNOSIS — M48.061 LUMBAR FORAMINAL STENOSIS: ICD-10-CM

## 2025-05-22 DIAGNOSIS — G89.29 CHRONIC BILATERAL LOW BACK PAIN, UNSPECIFIED WHETHER SCIATICA PRESENT: ICD-10-CM

## 2025-05-22 DIAGNOSIS — M54.16 RIGHT LUMBAR RADICULOPATHY: ICD-10-CM

## 2025-05-22 DIAGNOSIS — M54.50 CHRONIC BILATERAL LOW BACK PAIN, UNSPECIFIED WHETHER SCIATICA PRESENT: ICD-10-CM

## 2025-05-22 PROCEDURE — 99214 OFFICE O/P EST MOD 30 MIN: CPT

## 2025-05-22 NOTE — PROGRESS NOTES
"Chief Complaint  Diabetes    Subjective          Zane Joshi presents to [facility]  History of Present Illness  The patient came in for a follow-up visit today. He has been taking his blood pressure medications, amlodipine 5 mg and losartan 50 mg daily, as prescribed. Although he has a machine at home to check his blood pressure, he hasn't been using it regularly. His blood pressure was a bit high today at 148/82.    For his cholesterol, he is taking Lipitor 20 mg daily and thinks he is still taking Vascepa for his triglycerides. His diabetes is managed with Jardiance 25 mg daily, metformin 1000 mg twice a day, and Januvia 100 mg daily. He is also taking Cialis.    The patient mentioned that work has been terrible and slow recently, which is a new issue compared to last year when he expected things to . 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    Objective   Vital Signs:   /82 (BP Location: Left arm, Patient Position: Sitting)   Pulse 68   Resp 14   Ht 160 cm (62.99\")   Wt 80.7 kg (178 lb)   SpO2 96%   BMI 31.54 kg/m²     Physical Exam  Vitals and nursing note reviewed.   Constitutional:       Appearance: He is well-developed.   HENT:      Head: Normocephalic and atraumatic.   Musculoskeletal:      Cervical back: Normal range of motion and neck supple.   Neurological:      Mental Status: He is alert and oriented to person, place, and time.   Psychiatric:         Behavior: Behavior normal.         Physical Exam       Result Review :                 Assessment and Plan    Diagnoses and all orders for this visit:    1. Essential hypertension  -     amLODIPine (NORVASC) 5 MG tablet; Take 1 tablet by mouth Daily.  Dispense: 90 tablet; Refill: 3  -     losartan (Cozaar) 50 MG tablet; Take 1 tablet by mouth Daily.  Dispense: 90 tablet; Refill: 3  -     CBC & Differential  -     Comprehensive Metabolic Panel    2. Hyperlipidemia, " unspecified hyperlipidemia type  -     atorvastatin (Lipitor) 20 MG tablet; Take 1 tablet by mouth nightly  Dispense: 90 tablet; Refill: 3  -     Icosapent Ethyl (Vascepa) 0.5 g capsule; Take 4 capsules by mouth 2 (Two) Times a Day.  Dispense: 720 capsule; Refill: 3  -     Comprehensive Metabolic Panel  -     Lipid Panel With LDL / HDL Ratio    3. Type 2 diabetes mellitus with hyperglycemia, without long-term current use of insulin  -     empagliflozin (Jardiance) 25 MG tablet tablet; Take 1 tablet by mouth Daily.  Dispense: 90 tablet; Refill: 3  -     metFORMIN ER (GLUCOPHAGE-XR) 500 MG 24 hr tablet; Take 2 tablets by mouth 2 (Two) Times a Day.  Dispense: 360 tablet; Refill: 3  -     SITagliptin (Januvia) 100 MG tablet; Take 1 tablet by mouth Daily.  Dispense: 90 tablet; Refill: 1  -     Comprehensive Metabolic Panel  -     Hemoglobin A1c  -     Thyroid Panel With TSH  -     Microalbumin / Creatinine Urine Ratio - Urine, Clean Catch    4. Erectile dysfunction, unspecified erectile dysfunction type  -     tadalafil (Cialis) 20 MG tablet; Take 1 tablet by mouth Daily As Needed for Erectile Dysfunction.  Dispense: 30 tablet; Refill: 2      Assessment & Plan  1. Hypertension.  - Blood pressure is elevated at 148/82 today.  - Currently on amlodipine 5 mg daily and losartan 50 mg daily.  - Advised to monitor blood pressure at home; if it remains high, inform the provider during the next visit.  - Prescription refill for amlodipine and losartan provided.    2. Hyperlipidemia.  - Currently taking Lipitor 20 mg daily and Vascepa for triglyceride management.  - Prescription refill for Lipitor and Vascepa provided.  - Blood work will be conducted today to monitor lipid levels.    3. Diabetes Mellitus.  - Currently on Jardiance 25 mg daily, metformin 1000 mg twice a day, and Januvia 100 mg daily.  - Prescription refill for these medications provided.  - Blood work will be conducted today to monitor glucose levels and  A1c.    4. Erectile Dysfunction.  - Currently taking Cialis.  - Prescription refill for Cialis provided.  - No new concerns reported regarding the condition.    Follow Up   No follow-ups on file.  Patient was given instructions and counseling regarding his condition or for health maintenance advice. Please see specific information pulled into the AVS if appropriate.           Patient or patient representative verbalized consent for the use of Ambient Listening during the visit with  Mat Singh MD for chart documentation. 5/22/2025  18:06 EDT

## 2025-05-22 NOTE — PROGRESS NOTES
"CHIEF COMPLAINT  Back pain    Subjective   Zane Joshi is a 64 y.o. male  who presents to the office for follow-up of procedure.  He completed a Right L4 & L5 TF LESI on 4/23/25 performed by Dr. Cade for management of back pain. Patient reports 100% for 10-14 days relief from the procedure. He reports that he was able to be more active without experiencing increased pain.     Today pain is 7/10VAS in severity. Pain is located in the middle of his back and radiates into right buttocks and down right leg. He is unable to recall a specific pattern of radiculopathy. Describes this pain as a nearly continuous ache. Pain is worsened by walking long distances, prolonged standing, and increased physical activity. Pain improves with rest/reposition, heat, and medications. He is not completed any formalized PT for this issue.       Current pain medication includes OTC Ibuprofen. He was prescribed Tramadol back in January but reports no relief from this medication.      He takes a baby aspirin daily. A1C from 2/18/25 is 6.8    Procedures:  4/23/25 - Right L4 & L5 TF LESI - 100% relief for almost 2 weeks      Past therapies:  Physical Therapy: No  Chiropractor: No  Massage Therapy: Yes  TENS: No  Neck or back surgery: No  Past pain management: No     Back Pain  Chronicity:  Chronic  Onset:  More than 1 year ago  Frequency:  Constantly  Progression since onset:  Unchanged  Pain location:  Lumbar spine  Pain quality:  Aching  Radiates to: pain radiates down his \"whole leg\" - right leg.  Pain-numeric:  9/10 and 7/10  Pain severity:  Severe  Aggravated by:  Position, bending, standing and twisting  Associated symptoms: no abdominal pain, no chest pain, no fever, no headaches, no numbness and no weakness    Treatments tried:  Heat and NSAIDs     PEG Assessment   What number best describes your pain on average in the past week?7  What number best describes how, during the past week, pain has interfered with your enjoyment " of life?0  What number best describes how, during the past week, pain has interfered with your general activity?  0    Review of Pertinent Medical Data ---  Reviewed procedure note from Dr. Cade from 4/23/2025.  He notes that the injection at the 4 5 foramen was technically difficult due to degenerative changes and ossification.  He notes that if further procedures are needed, he would recommend trialing an interlaminar epidural rather than a selective root blockade for therapeutic purposes.    MRI OF THE LUMBAR SPINE WITHOUT CONTRAST ON 01/09/2025     CLINICAL HISTORY: This is a 64-year-old male patient with a history of  compression fracture body of thoracic vertebrae. Patient has low back  pain that radiates down bilateral legs.     TECHNIQUE: Sagittal T1, proton density and T2 and fat-suppressed T2  weighted images were obtained of the lumbar spine. In addition thin-cut  axial T1 and T2 weighted images were obtained angled through the  interspaces from L2-S1 and axial T2 weighted images were obtained from  the T11-12 interspace level down to the S2 sacral level.     COMPARISON: There are no prior lumbar spine imaging studies from [facility] for comparison.     FINDINGS: The distal thoracic cord and the conus is normal in signal  intensity. The conus terminates at the horizontal level of the mid body  of L1 which is normal.     At T11-12, the disc space and facets are normal in appearance with no  canal or foraminal narrowing.     At T12-L1, there is mild anterior marginal endplate spurring. The  posterior disc margin and facets are normal with no canal or foraminal  narrowing.      At L1-2 there is minimal anterior marginal endplate spurring. The  posterior disc margin and facets are normal and there is no canal or  foraminal narrowing.     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 Its apex is at the L4-5 lumbar level.     At L2-3, there is mild  bilateral facet overgrowth. There is some disc  space narrowing and degenerative endplate changes. There is a 2 to 3 mm  retrolisthesis of L2 with respect to L3 with some unroofed disc material  bulging diffusely along the posterior superior endplate of L3 and there  is moderate to severe narrowing of the thecal sac. There is spurring and  bulging disc material extending into the neural foramen and mild left  and mild-moderate right foraminal narrowing.     At L3-4, there is moderate bilateral facet overgrowth. There is disc  space narrowing, there are degenerative endplate changes most pronounced  in the right side of the endplates along the inner margin of the  levoscoliotic curve and there is diffuse posterior disc osteophyte  complex and there is severe narrowing of the thecal sac with slight  diminished spinal fluid bathing the cauda equina at this level. There is  some right lateral recess narrowing that could affect the traversing  right L4 nerve root. There is synovial thickening of the left facets  extending to the posterior left foramen and mild bulging disc material  into the inferior left foramen and there is mild-moderate left foraminal  narrowing. There is eccentric disc osteophyte complex extending into the  inferior right foramen and to the far right laterally and there is  moderate to severe right foraminal narrowing that could affect the  exiting right L3 nerve root.     At L4-5, there is moderate bilateral facet overgrowth. There is a 6 mm  rotational anterolisthesis of the right side of the L4 vertebrae with  respect to the right side of L5. There is unroofing of the posterior  central to right foraminal disc space due to the rotary anterolisthesis  and there is unroofed disc material bulging along the posterior inferior  endplate of L4 and into the inferior aspect of the right neural foramen.  There is moderate narrowing of the right side of the thecal sac, some  right lateral recess narrowing  that could affect the traversing right L5  nerve root. There is mild left foraminal narrowing and there is some  loss of vertical height of the right foramen, unroofed disc material  bulging into the inferior right foramen and there is at least moderate  up to moderate to severe right foraminal narrowing that could affect the  exiting right L4 nerve root.     At L5-S1 there is disc space narrowing, degenerative endplate changes, 3  mm retrolisthesis of L5 with respect to S1 and there is diffuse  posterior disc osteophyte complex that abuts the ventral aspect of the  traversing S1 nerve roots but does not displace them. There is no  significant canal or lateral recess narrowing. There is spurring into  the foramina and there is mild right and there is moderate left bony  foraminal narrowing.      IMPRESSION:  1. 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     2. The posterior disc margins and facets are normal with no canal or  foraminal narrowing from T11-L2.     3.  At L2-3, there is mild bilateral facet overgrowth and a 2 to 3 mm  retrolisthesis of L2 with respect to L3 and a mild diffuse posterior  disc osteophyte complex. There is moderate to severe narrowing of the  lumbar thecal sac and mild left and mild-moderate right foraminal  narrowing.     4. At L3-4, there is moderate bilateral facet overgrowth, there is a  diffuse posterior disc osteophyte complex and there is severe narrowing  of the thecal sac. There is some right lateral recess narrowing that  could affect the traversing right L4 nerve root, there is mild to  moderate left and there is moderate to severe right foraminal narrowing  that could affect the exiting L3 nerve roots bilaterally right greater  than left.     5. At L4-5, there is moderate  bilateral facet overgrowth and a 6 mm  rotatory anterolisthesis of the right side of L4 with respect to L5,  unroofed disc material diffusely bulging or protruding along the  posterior central to right foraminal inferior endplate of L4 and there  is moderate narrowing of the right side of the thecal sac, some right  lateral recess narrowing that could affect the traversing right L5 nerve  root and there is mild left foraminal narrowing and there is loss of  vertical height of the right foramen, unroofed disc material bulging  into the inferior right foramen and there is moderate to severe right  foraminal narrowing that could affect the exiting right L4 nerve root.     6. L5-S1, there is 3 mm retrolisthesis of L5 with respect to S1 and  diffuse posterior disc osteophyte complex that abuts the ventral aspect  of the traversing S1 nerve roots but does not displace them. There is no  significant canal or lateral recess narrowing. There is mild right and  moderate left foraminal narrowing. The remainder of the lumbar spine MRI  is unremarkable.      This report was finalized on 1/10/2025 5:57 AM by Dr. Yuan Miranda M.D  on Workstation: [id number]     The following portions of the patient's history were reviewed and updated as appropriate: allergies, current medications, past family history, past medical history, past social history, past surgical history, and problem list.    Review of Systems   Constitutional:  Negative for chills and fever.   Respiratory:  Negative for cough and shortness of breath.    Cardiovascular:  Negative for chest pain.   Gastrointestinal:  Negative for abdominal pain, constipation and diarrhea.   Genitourinary:  Negative for difficulty urinating.   Musculoskeletal:  Positive for back pain.   Neurological:  Negative for dizziness, weakness, light-headedness, numbness and headaches.     I have reviewed and confirmed the accuracy of the ROS as documented by the MA/LPN/RN Michelle Wren,  "APRN     Vitals:    05/22/25 0852   BP: 138/92   BP Location: Left arm   Patient Position: Sitting   Pulse: 72   Temp: 96.4 °F (35.8 °C)   TempSrc: Temporal   SpO2: 97%   Weight: 81.4 kg (179 lb 6.4 oz)   Height: 160 cm (62.99\")   PainSc: 7      Objective   Physical Exam  Constitutional:       Appearance: Normal appearance.   HENT:      Head: Normocephalic.   Cardiovascular:      Rate and Rhythm: Normal rate and regular rhythm.   Pulmonary:      Effort: Pulmonary effort is normal.      Breath sounds: Normal breath sounds.   Musculoskeletal:      Cervical back: Normal range of motion.      Lumbar back: No tenderness or bony tenderness. Decreased range of motion. Negative right straight leg raise test and negative left straight leg raise test.   Skin:     General: Skin is warm and dry.      Capillary Refill: Capillary refill takes less than 2 seconds.   Neurological:      General: No focal deficit present.      Mental Status: He is alert and oriented to person, place, and time.   Psychiatric:         Mood and Affect: Mood normal.         Behavior: Behavior normal.         Thought Content: Thought content normal.         Cognition and Memory: Cognition normal.       Assessment & Plan   Diagnoses and all orders for this visit:    1. Right lumbar radiculopathy (Primary)    2. Lumbar foraminal stenosis    3. Chronic bilateral low back pain, unspecified whether sciatica present    4. Degeneration of intervertebral disc of lumbar region with discogenic back pain and lower extremity pain      Zane Joshi reports a pain score of 7.  Given his pain assessment as noted, treatment options were discussed and the following options were decided upon as a follow-up plan to address the patient's pain: continuation of current treatment plan for pain and steroid injections.    --- L4-L5 LESI   ---  Indications for epidural injection:  Plan is to proceed with epidural at the appropriate level.  If the patient receives significant " pain reduction and improvement in function and the plan will be to repeat the epidural when the pain worsens.  If a second epidural provides at least 6 weeks of sustained improvement that includes both pain reduction and improvement in function then an epidural injection could be repeated once again at the same level.  This is a mutual decision between the clinician and the patient that includes discussions including risks and benefits in detail as well as alternative therapies.  Patient's questions were answered to their satisfaction and to their understanding.  ---   --- Follow-up for procedure      JAYSON REPORT  As the clinician, I personally reviewed the JAYSON from 5/22/25 while the patient was in the office today.    Dictated utilizing Dragon dictation.

## 2025-05-22 NOTE — H&P (VIEW-ONLY)
"CHIEF COMPLAINT  Back pain    Subjective   Zane Joshi is a 64 y.o. male  who presents to the office for follow-up of procedure.  He completed a Right L4 & L5 TF LESI on 4/23/25 performed by Dr. Cade for management of back pain. Patient reports 100% for 10-14 days relief from the procedure. He reports that he was able to be more active without experiencing increased pain.     Today pain is 7/10VAS in severity. Pain is located in the middle of his back and radiates into right buttocks and down right leg. He is unable to recall a specific pattern of radiculopathy. Describes this pain as a nearly continuous ache. Pain is worsened by walking long distances, prolonged standing, and increased physical activity. Pain improves with rest/reposition, heat, and medications. He is not completed any formalized PT for this issue.       Current pain medication includes OTC Ibuprofen. He was prescribed Tramadol back in January but reports no relief from this medication.      He takes a baby aspirin daily. A1C from 2/18/25 is 6.8    Procedures:  4/23/25 - Right L4 & L5 TF LESI - 100% relief for almost 2 weeks      Past therapies:  Physical Therapy: No  Chiropractor: No  Massage Therapy: Yes  TENS: No  Neck or back surgery: No  Past pain management: No     Back Pain  Chronicity:  Chronic  Onset:  More than 1 year ago  Frequency:  Constantly  Progression since onset:  Unchanged  Pain location:  Lumbar spine  Pain quality:  Aching  Radiates to: pain radiates down his \"whole leg\" - right leg.  Pain-numeric:  9/10 and 7/10  Pain severity:  Severe  Aggravated by:  Position, bending, standing and twisting  Associated symptoms: no abdominal pain, no chest pain, no fever, no headaches, no numbness and no weakness    Treatments tried:  Heat and NSAIDs     PEG Assessment   What number best describes your pain on average in the past week?7  What number best describes how, during the past week, pain has interfered with your enjoyment " of life?0  What number best describes how, during the past week, pain has interfered with your general activity?  0    Review of Pertinent Medical Data ---  Reviewed procedure note from Dr. Cade from 4/23/2025.  He notes that the injection at the 4 5 foramen was technically difficult due to degenerative changes and ossification.  He notes that if further procedures are needed, he would recommend trialing an interlaminar epidural rather than a selective root blockade for therapeutic purposes.    MRI OF THE LUMBAR SPINE WITHOUT CONTRAST ON 01/09/2025     CLINICAL HISTORY: This is a 64-year-old male patient with a history of  compression fracture body of thoracic vertebrae. Patient has low back  pain that radiates down bilateral legs.     TECHNIQUE: Sagittal T1, proton density and T2 and fat-suppressed T2  weighted images were obtained of the lumbar spine. In addition thin-cut  axial T1 and T2 weighted images were obtained angled through the  interspaces from L2-S1 and axial T2 weighted images were obtained from  the T11-12 interspace level down to the S2 sacral level.     COMPARISON: There are no prior lumbar spine imaging studies from [facility] for comparison.     FINDINGS: The distal thoracic cord and the conus is normal in signal  intensity. The conus terminates at the horizontal level of the mid body  of L1 which is normal.     At T11-12, the disc space and facets are normal in appearance with no  canal or foraminal narrowing.     At T12-L1, there is mild anterior marginal endplate spurring. The  posterior disc margin and facets are normal with no canal or foraminal  narrowing.      At L1-2 there is minimal anterior marginal endplate spurring. The  posterior disc margin and facets are normal and there is no canal or  foraminal narrowing.     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 Its apex is at the L4-5 lumbar level.     At L2-3, there is mild  bilateral facet overgrowth. There is some disc  space narrowing and degenerative endplate changes. There is a 2 to 3 mm  retrolisthesis of L2 with respect to L3 with some unroofed disc material  bulging diffusely along the posterior superior endplate of L3 and there  is moderate to severe narrowing of the thecal sac. There is spurring and  bulging disc material extending into the neural foramen and mild left  and mild-moderate right foraminal narrowing.     At L3-4, there is moderate bilateral facet overgrowth. There is disc  space narrowing, there are degenerative endplate changes most pronounced  in the right side of the endplates along the inner margin of the  levoscoliotic curve and there is diffuse posterior disc osteophyte  complex and there is severe narrowing of the thecal sac with slight  diminished spinal fluid bathing the cauda equina at this level. There is  some right lateral recess narrowing that could affect the traversing  right L4 nerve root. There is synovial thickening of the left facets  extending to the posterior left foramen and mild bulging disc material  into the inferior left foramen and there is mild-moderate left foraminal  narrowing. There is eccentric disc osteophyte complex extending into the  inferior right foramen and to the far right laterally and there is  moderate to severe right foraminal narrowing that could affect the  exiting right L3 nerve root.     At L4-5, there is moderate bilateral facet overgrowth. There is a 6 mm  rotational anterolisthesis of the right side of the L4 vertebrae with  respect to the right side of L5. There is unroofing of the posterior  central to right foraminal disc space due to the rotary anterolisthesis  and there is unroofed disc material bulging along the posterior inferior  endplate of L4 and into the inferior aspect of the right neural foramen.  There is moderate narrowing of the right side of the thecal sac, some  right lateral recess narrowing  that could affect the traversing right L5  nerve root. There is mild left foraminal narrowing and there is some  loss of vertical height of the right foramen, unroofed disc material  bulging into the inferior right foramen and there is at least moderate  up to moderate to severe right foraminal narrowing that could affect the  exiting right L4 nerve root.     At L5-S1 there is disc space narrowing, degenerative endplate changes, 3  mm retrolisthesis of L5 with respect to S1 and there is diffuse  posterior disc osteophyte complex that abuts the ventral aspect of the  traversing S1 nerve roots but does not displace them. There is no  significant canal or lateral recess narrowing. There is spurring into  the foramina and there is mild right and there is moderate left bony  foraminal narrowing.      IMPRESSION:  1. 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     2. The posterior disc margins and facets are normal with no canal or  foraminal narrowing from T11-L2.     3.  At L2-3, there is mild bilateral facet overgrowth and a 2 to 3 mm  retrolisthesis of L2 with respect to L3 and a mild diffuse posterior  disc osteophyte complex. There is moderate to severe narrowing of the  lumbar thecal sac and mild left and mild-moderate right foraminal  narrowing.     4. At L3-4, there is moderate bilateral facet overgrowth, there is a  diffuse posterior disc osteophyte complex and there is severe narrowing  of the thecal sac. There is some right lateral recess narrowing that  could affect the traversing right L4 nerve root, there is mild to  moderate left and there is moderate to severe right foraminal narrowing  that could affect the exiting L3 nerve roots bilaterally right greater  than left.     5. At L4-5, there is moderate  bilateral facet overgrowth and a 6 mm  rotatory anterolisthesis of the right side of L4 with respect to L5,  unroofed disc material diffusely bulging or protruding along the  posterior central to right foraminal inferior endplate of L4 and there  is moderate narrowing of the right side of the thecal sac, some right  lateral recess narrowing that could affect the traversing right L5 nerve  root and there is mild left foraminal narrowing and there is loss of  vertical height of the right foramen, unroofed disc material bulging  into the inferior right foramen and there is moderate to severe right  foraminal narrowing that could affect the exiting right L4 nerve root.     6. L5-S1, there is 3 mm retrolisthesis of L5 with respect to S1 and  diffuse posterior disc osteophyte complex that abuts the ventral aspect  of the traversing S1 nerve roots but does not displace them. There is no  significant canal or lateral recess narrowing. There is mild right and  moderate left foraminal narrowing. The remainder of the lumbar spine MRI  is unremarkable.      This report was finalized on 1/10/2025 5:57 AM by Dr. Yuan Miranda M.D  on Workstation: [id number]     The following portions of the patient's history were reviewed and updated as appropriate: allergies, current medications, past family history, past medical history, past social history, past surgical history, and problem list.    Review of Systems   Constitutional:  Negative for chills and fever.   Respiratory:  Negative for cough and shortness of breath.    Cardiovascular:  Negative for chest pain.   Gastrointestinal:  Negative for abdominal pain, constipation and diarrhea.   Genitourinary:  Negative for difficulty urinating.   Musculoskeletal:  Positive for back pain.   Neurological:  Negative for dizziness, weakness, light-headedness, numbness and headaches.     I have reviewed and confirmed the accuracy of the ROS as documented by the MA/LPN/RN Michelle Wren,  "APRN     Vitals:    05/22/25 0852   BP: 138/92   BP Location: Left arm   Patient Position: Sitting   Pulse: 72   Temp: 96.4 °F (35.8 °C)   TempSrc: Temporal   SpO2: 97%   Weight: 81.4 kg (179 lb 6.4 oz)   Height: 160 cm (62.99\")   PainSc: 7      Objective   Physical Exam  Constitutional:       Appearance: Normal appearance.   HENT:      Head: Normocephalic.   Cardiovascular:      Rate and Rhythm: Normal rate and regular rhythm.   Pulmonary:      Effort: Pulmonary effort is normal.      Breath sounds: Normal breath sounds.   Musculoskeletal:      Cervical back: Normal range of motion.      Lumbar back: No tenderness or bony tenderness. Decreased range of motion. Negative right straight leg raise test and negative left straight leg raise test.   Skin:     General: Skin is warm and dry.      Capillary Refill: Capillary refill takes less than 2 seconds.   Neurological:      General: No focal deficit present.      Mental Status: He is alert and oriented to person, place, and time.   Psychiatric:         Mood and Affect: Mood normal.         Behavior: Behavior normal.         Thought Content: Thought content normal.         Cognition and Memory: Cognition normal.       Assessment & Plan   Diagnoses and all orders for this visit:    1. Right lumbar radiculopathy (Primary)    2. Lumbar foraminal stenosis    3. Chronic bilateral low back pain, unspecified whether sciatica present    4. Degeneration of intervertebral disc of lumbar region with discogenic back pain and lower extremity pain      Zane Joshi reports a pain score of 7.  Given his pain assessment as noted, treatment options were discussed and the following options were decided upon as a follow-up plan to address the patient's pain: continuation of current treatment plan for pain and steroid injections.    --- L4-L5 LESI   ---  Indications for epidural injection:  Plan is to proceed with epidural at the appropriate level.  If the patient receives significant " pain reduction and improvement in function and the plan will be to repeat the epidural when the pain worsens.  If a second epidural provides at least 6 weeks of sustained improvement that includes both pain reduction and improvement in function then an epidural injection could be repeated once again at the same level.  This is a mutual decision between the clinician and the patient that includes discussions including risks and benefits in detail as well as alternative therapies.  Patient's questions were answered to their satisfaction and to their understanding.  ---   --- Follow-up for procedure      JAYSON REPORT  As the clinician, I personally reviewed the JAYSON from 5/22/25 while the patient was in the office today.    Dictated utilizing Dragon dictation.

## 2025-06-10 ENCOUNTER — PREP FOR SURGERY (OUTPATIENT)
Dept: OTHER | Facility: HOSPITAL | Age: 65
End: 2025-06-10
Payer: COMMERCIAL

## 2025-06-10 DIAGNOSIS — M54.16 RIGHT LUMBAR RADICULOPATHY: Primary | ICD-10-CM

## 2025-06-10 RX ORDER — METHYLPREDNISOLONE ACETATE 80 MG/ML
80 INJECTION, SUSPENSION INTRA-ARTICULAR; INTRALESIONAL; INTRAMUSCULAR; SOFT TISSUE ONCE
Status: CANCELLED | OUTPATIENT
Start: 2025-06-11 | End: 2025-06-11

## 2025-06-10 RX ORDER — LIDOCAINE HYDROCHLORIDE 10 MG/ML
5 INJECTION, SOLUTION INFILTRATION; PERINEURAL ONCE
Status: CANCELLED | OUTPATIENT
Start: 2025-06-11 | End: 2025-06-11

## 2025-06-10 RX ORDER — IOPAMIDOL 408 MG/ML
12 INJECTION, SOLUTION INTRATHECAL
Status: CANCELLED | OUTPATIENT
Start: 2025-06-11 | End: 2025-06-11

## 2025-06-10 RX ORDER — BUPIVACAINE HYDROCHLORIDE 2.5 MG/ML
10 INJECTION, SOLUTION EPIDURAL; INFILTRATION; INTRACAUDAL; PERINEURAL ONCE
Status: CANCELLED | OUTPATIENT
Start: 2025-06-11 | End: 2025-06-11

## 2025-06-11 ENCOUNTER — HOSPITAL ENCOUNTER (OUTPATIENT)
Dept: PAIN MEDICINE | Facility: HOSPITAL | Age: 65
Discharge: HOME | End: 2025-06-11
Payer: COMMERCIAL

## 2025-06-11 ENCOUNTER — HOSPITAL ENCOUNTER (OUTPATIENT)
Dept: GENERAL RADIOLOGY | Facility: HOSPITAL | Age: 65
Discharge: HOME | End: 2025-06-11
Payer: COMMERCIAL

## 2025-06-11 VITALS
SYSTOLIC BLOOD PRESSURE: 134 MMHG | TEMPERATURE: 97.1 F | HEART RATE: 65 BPM | OXYGEN SATURATION: 94 % | DIASTOLIC BLOOD PRESSURE: 85 MMHG | RESPIRATION RATE: 16 BRPM

## 2025-06-11 DIAGNOSIS — M48.061 LUMBAR FORAMINAL STENOSIS: ICD-10-CM

## 2025-06-11 DIAGNOSIS — G89.29 CHRONIC BILATERAL LOW BACK PAIN, UNSPECIFIED WHETHER SCIATICA PRESENT: ICD-10-CM

## 2025-06-11 DIAGNOSIS — R52 PAIN: ICD-10-CM

## 2025-06-11 DIAGNOSIS — M54.50 CHRONIC BILATERAL LOW BACK PAIN, UNSPECIFIED WHETHER SCIATICA PRESENT: ICD-10-CM

## 2025-06-11 DIAGNOSIS — M54.16 RIGHT LUMBAR RADICULOPATHY: ICD-10-CM

## 2025-06-11 PROCEDURE — 25010000002 METHYLPREDNISOLONE PER 80 MG: Performed by: ANESTHESIOLOGY

## 2025-06-11 PROCEDURE — 25010000002 BUPIVACAINE (PF) 0.25 % SOLUTION: Performed by: ANESTHESIOLOGY

## 2025-06-11 PROCEDURE — 77003 FLUOROGUIDE FOR SPINE INJECT: CPT

## 2025-06-11 PROCEDURE — 25510000001 IOPAMIDOL 41 % SOLUTION: Performed by: ANESTHESIOLOGY

## 2025-06-11 RX ORDER — BUPIVACAINE HYDROCHLORIDE 2.5 MG/ML
10 INJECTION, SOLUTION EPIDURAL; INFILTRATION; INTRACAUDAL; PERINEURAL ONCE
Status: COMPLETED | OUTPATIENT
Start: 2025-06-11 | End: 2025-06-11

## 2025-06-11 RX ORDER — IOPAMIDOL 408 MG/ML
12 INJECTION, SOLUTION INTRATHECAL
Status: COMPLETED | OUTPATIENT
Start: 2025-06-11 | End: 2025-06-11

## 2025-06-11 RX ORDER — METHYLPREDNISOLONE ACETATE 80 MG/ML
80 INJECTION, SUSPENSION INTRA-ARTICULAR; INTRALESIONAL; INTRAMUSCULAR; SOFT TISSUE ONCE
Status: COMPLETED | OUTPATIENT
Start: 2025-06-11 | End: 2025-06-11

## 2025-06-11 RX ORDER — LIDOCAINE HYDROCHLORIDE 10 MG/ML
5 INJECTION, SOLUTION INFILTRATION; PERINEURAL ONCE
Status: DISCONTINUED | OUTPATIENT
Start: 2025-06-11 | End: 2025-06-12 | Stop reason: HOSPADM

## 2025-06-11 RX ADMIN — METHYLPREDNISOLONE ACETATE 80 MG: 80 INJECTION, SUSPENSION INTRA-ARTICULAR; INTRALESIONAL; INTRAMUSCULAR; SOFT TISSUE at 10:34

## 2025-06-11 RX ADMIN — IOPAMIDOL 10 ML: 408 INJECTION, SOLUTION INTRATHECAL at 10:34

## 2025-06-11 RX ADMIN — BUPIVACAINE HYDROCHLORIDE 10 ML: 2.5 INJECTION, SOLUTION EPIDURAL; INFILTRATION; INTRACAUDAL; PERINEURAL at 10:34

## 2025-06-11 NOTE — PROCEDURES
Procedures    Lumbar Epidural Steroid Injection  [facility]    PREOPERATIVE DIAGNOSIS:   Lumbar Degenerative Disc Disease and right Lumbar Radiculopathy  POSTOPERATIVE DIAGNOSIS:  Same as preop diagnosis    PROCEDURE:   Lumbar Epidural Steroid Injection, Therapeutic Translaminar Injection, with epidurogram, at  L4/L5 level    PRE-PROCEDURE DISCUSSION WITH PATIENT:    Risks and complications were discussed with the patient prior to starting the procedure and informed consent was obtained.  We discussed various topics including but not limited to bleeding, infection, injury, paralysis, nerve injury, dural puncture, coma, death, worsening of clinical picture, lack of pain relief, and postprocedural soreness.    Preprocedure assessment/presedation assessment is noted as the H&P/H&P update as part of this Epic chart encounter.  Preassessment plan and preprocedure airway assessment are noted.  Immediate in the room airway assessment is unchanged from the preprocedure assessment performed in the preoperative area a few minutes ago.      SURGEON:  Kamaljit Cade MD    REASON FOR PROCEDURE:    Degenerative changes are noted in the area. and Radiating pattern of pain is likely consistent with degenerative changes in the area.  -- Previous right LTFESI was diagnostically positive.    SEDATION:  Patient declined administration of moderate sedation    ANESTHETIC:  Marcaine 0.25%  STEROID:   Methylprednisolone (DEPO MEDROL) 80mg/ml    DESCRIPTON OF PROCEDURE:    After obtaining informed consent, I.V. was not started in the preop area.   The patient was taken to the operating room and placed in the prone position.  EKG, blood pressure, and pulse oximeter were monitored throughout, and sedation was provided as needed by the RN under my guidance. All pressure points were well padded.  The lumbar spine area was prepped with Chloraprep and draped in a sterile fashion.  Under fluoroscopic guidance, the above mentioned  interlaminar space was identified. Skin and subcutaneous tissues were anesthetized with 1% lidocaine in the middle of the space. A Tuohy needle was introduced through the skin and advanced to this interlaminar space and into the epidural space under fluoroscopic guidance and verified with loss-of-resistance technique to air.  After confirming the position of the needle with the fluoroscope with all the views, and after aspiration was confirmed negative for blood and CSF, 1.5 mL of Omnipaque was injected.  After seeing appropriate epidurogram with lateral and PA views, a total of 3 cc solution was injected, consisting of 1cc of local anesthetic as above, with normal saline and injectable steroid as above.     ESTIMATED BLOOD LOSS:  <5 mL  SPECIMENS:  None    COMPLICATIONS:     No complications were noted., There was no indication of vascular uptake on live injection of contrast dye., There was no indication of intrathecal uptake on live injection of contrast dye., There was not any evidence of dural puncture.  , and The patient did not have any signs of postprocedure numbness nor weakness.    TOLERANCE & DISCHARGE CONDITION:    The patient tolerated the procedure well.  The patient was transported to the recovery area without difficulties.  The patient was discharged to home under the care of family in stable and satisfactory condition.    PLAN OF CARE:  The patient was given our standard instruction sheet.  The patient will Return to clinic 4 wks  The patient will resume all medications as per the medication reconciliation sheet.

## 2025-06-11 NOTE — DISCHARGE INSTRUCTIONS
[facility] - Post-procedure Instructions          --  While there are no absolute restrictions, it is recommended that you do not perform strenuous activity today. In the morning, you may resume your level of activity as before your block.    --  If you have a band-aid at your injection site, please remove it later today. Observe the area for any redness, swelling, pus-like drainage, or a temperature over 101°. If any of these symptoms occur, please call your doctor at [phone]. If after office hours, leave a message and the on-call provider will return your call.    --  Ice may be applied to your injection site. It is recommended you avoid direct heat (heating pad; hot tub) for 1-2 days.    --  Call [facility] at [phone] if you experience persistent headache, persistent bleeding from the injection site, or severe pain not relieved by heat or oral medication.    --  Do not make important decisions today.    --  Due to the effects of the block and/or the I.V. Sedation, DO NOT drive or operate hazardous machinery for 12 hours.  Local anesthetics may cause numbness after procedure and precautions must be taken with regards to operating equipment as well as with walking, even if ambulating with assistance of another person or with an assistive device.    --  Do not drink alcohol for 12 hours.    -- You may return to work tomorrow, or as directed by your referring doctor.    --  Occasionally you may notice a slight increase in your pain after the procedure. This should start to improve within the next 24-48 hours. Radiofrequency ablation procedure pain may last 3-4 weeks.    --  It may take as long as 3-4 days before you notice a gradual improvement in your pain and/or other symptoms.    -- You may continue to take your prescribed pain medication as needed.    --  Some normal possible side effects of steroid use could include fluid retention, increased blood sugar, dull headache,  increased sweating, increased appetite, mood swings and flushing.    --  Diabetics are recommended to watch their blood glucose level closely for 24-48 hours after the injection.    --  Must stay in PACU for 20 min upon arrival and prove no leg weakness before being discharged.    --  IN THE EVENT OF A LIFE THREATENING EMERGENCY, (CHEST PAIN, BREATHING DIFFICULTIES, PARALYSIS…) YOU SHOULD GO TO YOUR NEAREST EMERGENCY ROOM.    --  You should be contacted by our office within 2-3 days to schedule follow up or next appointment date.  If not contacted within 7 days, please call the office at [phone]

## 2025-07-11 ENCOUNTER — OFFICE VISIT (OUTPATIENT)
Dept: PAIN MEDICINE | Facility: CLINIC | Age: 65
End: 2025-07-11
Payer: COMMERCIAL

## 2025-07-11 VITALS
TEMPERATURE: 96.9 F | SYSTOLIC BLOOD PRESSURE: 125 MMHG | HEIGHT: 64 IN | OXYGEN SATURATION: 95 % | RESPIRATION RATE: 18 BRPM | BODY MASS INDEX: 30.05 KG/M2 | WEIGHT: 176 LBS | DIASTOLIC BLOOD PRESSURE: 82 MMHG | HEART RATE: 72 BPM

## 2025-07-11 DIAGNOSIS — M51.362 DEGENERATION OF INTERVERTEBRAL DISC OF LUMBAR REGION WITH DISCOGENIC BACK PAIN AND LOWER EXTREMITY PAIN: ICD-10-CM

## 2025-07-11 DIAGNOSIS — M48.061 LUMBAR FORAMINAL STENOSIS: Primary | ICD-10-CM

## 2025-07-11 DIAGNOSIS — G89.29 CHRONIC BILATERAL LOW BACK PAIN, UNSPECIFIED WHETHER SCIATICA PRESENT: ICD-10-CM

## 2025-07-11 DIAGNOSIS — M54.16 RIGHT LUMBAR RADICULOPATHY: ICD-10-CM

## 2025-07-11 DIAGNOSIS — M54.50 CHRONIC BILATERAL LOW BACK PAIN, UNSPECIFIED WHETHER SCIATICA PRESENT: ICD-10-CM

## 2025-07-11 PROCEDURE — 99214 OFFICE O/P EST MOD 30 MIN: CPT

## 2025-07-11 RX ORDER — GABAPENTIN 300 MG/1
CAPSULE ORAL
Qty: 90 CAPSULE | Refills: 0 | Status: SHIPPED | OUTPATIENT
Start: 2025-07-11

## 2025-07-11 NOTE — PROGRESS NOTES
"CHIEF COMPLAINT  Back pain    Subjective   Zane Joshi is a 65 y.o. male  who presents to the office for follow-up of procedure.  He completed a L4/L5 LESI on 6/11/25 performed by Dr. Cade for management of back pain. Patient reports 100% relief from the procedure x 2 weeks.    Today pain is 5/10VAS in severity. Pain is located in the middle of his back and radiates into right buttocks and down right leg. He is unable to recall a specific pattern of radiculopathy. Pain is worse when he walks and improves with rest. Describes this pain as a nearly continuous ache. Pain is worsened by walking long distances, prolonged standing, and increased physical activity. Pain improves with rest/reposition, heat, and medications. He is not completed any formalized PT for this issue.       Current pain medication includes OTC Ibuprofen. He was prescribed Tramadol back in January but reports no relief from this medication.      He takes a baby aspirin daily. A1C from 2/18/25 is 6.8     Procedures:  6/11/25 - L4/L5 LESI - 100% relief x 2 weeks   4/23/25 - Right L4 & L5 TF LESI - 100% relief for almost 2 weeks      Past therapies:  Physical Therapy: No  Chiropractor: No  Massage Therapy: Yes  TENS: No  Neck or back surgery: No  Past pain management: No    Back Pain  Chronicity:  Chronic  Onset:  More than 1 year ago  Frequency:  Constantly  Progression since onset:  Unchanged (pain has remained consistent since his last office visit)  Pain location:  Lumbar spine  Pain quality:  Aching  Radiates to: pain radiates down his \"whole leg\" - right leg.  Pain-numeric:  5/10  Pain severity:  Severe  Aggravated by:  Position, bending, standing and twisting  Associated symptoms: no abdominal pain, no chest pain, no fever, no headaches, no numbness and no weakness    Treatments tried:  Heat and NSAIDs    PEG Assessment   What number best describes your pain on average in the past week?5  What number best describes how, during the past " week, pain has interfered with your enjoyment of life?0  What number best describes how, during the past week, pain has interfered with your general activity?  0    Review of Pertinent Medical Data ---  MRI OF THE LUMBAR SPINE WITHOUT CONTRAST ON 01/09/2025     CLINICAL HISTORY: This is a 64-year-old male patient with a history of  compression fracture body of thoracic vertebrae. Patient has low back  pain that radiates down bilateral legs.     TECHNIQUE: Sagittal T1, proton density and T2 and fat-suppressed T2  weighted images were obtained of the lumbar spine. In addition thin-cut  axial T1 and T2 weighted images were obtained angled through the  interspaces from L2-S1 and axial T2 weighted images were obtained from  the T11-12 interspace level down to the S2 sacral level.     COMPARISON: There are no prior lumbar spine imaging studies from [facility] for comparison.     FINDINGS: The distal thoracic cord and the conus is normal in signal  intensity. The conus terminates at the horizontal level of the mid body  of L1 which is normal.     At T11-12, the disc space and facets are normal in appearance with no  canal or foraminal narrowing.     At T12-L1, there is mild anterior marginal endplate spurring. The  posterior disc margin and facets are normal with no canal or foraminal  narrowing.      At L1-2 there is minimal anterior marginal endplate spurring. The  posterior disc margin and facets are normal and there is no canal or  foraminal narrowing.     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 Its apex is at the L4-5 lumbar level.     At L2-3, there is mild bilateral facet overgrowth. There is some disc  space narrowing and degenerative endplate changes. There is a 2 to 3 mm  retrolisthesis of L2 with respect to L3 with some unroofed disc material  bulging diffusely along the posterior superior endplate of L3 and there  is moderate to severe narrowing of the  thecal sac. There is spurring and  bulging disc material extending into the neural foramen and mild left  and mild-moderate right foraminal narrowing.     At L3-4, there is moderate bilateral facet overgrowth. There is disc  space narrowing, there are degenerative endplate changes most pronounced  in the right side of the endplates along the inner margin of the  levoscoliotic curve and there is diffuse posterior disc osteophyte  complex and there is severe narrowing of the thecal sac with slight  diminished spinal fluid bathing the cauda equina at this level. There is  some right lateral recess narrowing that could affect the traversing  right L4 nerve root. There is synovial thickening of the left facets  extending to the posterior left foramen and mild bulging disc material  into the inferior left foramen and there is mild-moderate left foraminal  narrowing. There is eccentric disc osteophyte complex extending into the  inferior right foramen and to the far right laterally and there is  moderate to severe right foraminal narrowing that could affect the  exiting right L3 nerve root.     At L4-5, there is moderate bilateral facet overgrowth. There is a 6 mm  rotational anterolisthesis of the right side of the L4 vertebrae with  respect to the right side of L5. There is unroofing of the posterior  central to right foraminal disc space due to the rotary anterolisthesis  and there is unroofed disc material bulging along the posterior inferior  endplate of L4 and into the inferior aspect of the right neural foramen.  There is moderate narrowing of the right side of the thecal sac, some  right lateral recess narrowing that could affect the traversing right L5  nerve root. There is mild left foraminal narrowing and there is some  loss of vertical height of the right foramen, unroofed disc material  bulging into the inferior right foramen and there is at least moderate  up to moderate to severe right foraminal narrowing  that could affect the  exiting right L4 nerve root.     At L5-S1 there is disc space narrowing, degenerative endplate changes, 3  mm retrolisthesis of L5 with respect to S1 and there is diffuse  posterior disc osteophyte complex that abuts the ventral aspect of the  traversing S1 nerve roots but does not displace them. There is no  significant canal or lateral recess narrowing. There is spurring into  the foramina and there is mild right and there is moderate left bony  foraminal narrowing.      IMPRESSION:  1. 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     2. The posterior disc margins and facets are normal with no canal or  foraminal narrowing from T11-L2.     3.  At L2-3, there is mild bilateral facet overgrowth and a 2 to 3 mm  retrolisthesis of L2 with respect to L3 and a mild diffuse posterior  disc osteophyte complex. There is moderate to severe narrowing of the  lumbar thecal sac and mild left and mild-moderate right foraminal  narrowing.     4. At L3-4, there is moderate bilateral facet overgrowth, there is a  diffuse posterior disc osteophyte complex and there is severe narrowing  of the thecal sac. There is some right lateral recess narrowing that  could affect the traversing right L4 nerve root, there is mild to  moderate left and there is moderate to severe right foraminal narrowing  that could affect the exiting L3 nerve roots bilaterally right greater  than left.     5. At L4-5, there is moderate bilateral facet overgrowth and a 6 mm  rotatory anterolisthesis of the right side of L4 with respect to L5,  unroofed disc material diffusely bulging or protruding along the  posterior central to right foraminal inferior endplate of L4 and there  is moderate narrowing of the right side of the thecal sac,  "some right  lateral recess narrowing that could affect the traversing right L5 nerve  root and there is mild left foraminal narrowing and there is loss of  vertical height of the right foramen, unroofed disc material bulging  into the inferior right foramen and there is moderate to severe right  foraminal narrowing that could affect the exiting right L4 nerve root.     6. L5-S1, there is 3 mm retrolisthesis of L5 with respect to S1 and  diffuse posterior disc osteophyte complex that abuts the ventral aspect  of the traversing S1 nerve roots but does not displace them. There is no  significant canal or lateral recess narrowing. There is mild right and  moderate left foraminal narrowing. The remainder of the lumbar spine MRI  is unremarkable.      This report was finalized on 1/10/2025 5:57 AM by Dr. Yuan Miranda M.D  on Workstation: [id number]     The following portions of the patient's history were reviewed and updated as appropriate: allergies, current medications, past family history, past medical history, past social history, past surgical history, and problem list.    Review of Systems   Constitutional:  Negative for activity change and fever.   Cardiovascular:  Negative for chest pain.   Gastrointestinal:  Negative for abdominal pain, constipation and diarrhea.   Genitourinary:  Negative for difficulty urinating.   Musculoskeletal:  Positive for back pain.   Neurological:  Negative for weakness, numbness and headaches.   Psychiatric/Behavioral:  Negative for self-injury, sleep disturbance and suicidal ideas.      I have reviewed and confirmed the accuracy of the ROS as documented by the MA/LPN/RN DONAL Smalls    Vitals:    07/11/25 0804   BP: 125/82   Pulse: 72   Resp: 18   Temp: 96.9 °F (36.1 °C)   SpO2: 95%   Weight: 79.8 kg (176 lb)   Height: 162.6 cm (64\")   PainSc: 5      Objective   Physical Exam  Constitutional:       Appearance: Normal appearance.   HENT:      Head: Normocephalic. "   Cardiovascular:      Rate and Rhythm: Normal rate and regular rhythm.   Pulmonary:      Effort: Pulmonary effort is normal.      Breath sounds: Normal breath sounds.   Musculoskeletal:      Cervical back: Normal range of motion.      Lumbar back: Tenderness and bony tenderness present. Decreased range of motion. Negative right straight leg raise test and negative left straight leg raise test.   Skin:     General: Skin is warm and dry.      Capillary Refill: Capillary refill takes less than 2 seconds.   Neurological:      General: No focal deficit present.      Mental Status: He is alert and oriented to person, place, and time.   Psychiatric:         Mood and Affect: Mood normal.         Behavior: Behavior normal.         Thought Content: Thought content normal.         Cognition and Memory: Cognition normal.       Assessment & Plan   Diagnoses and all orders for this visit:    1. Lumbar foraminal stenosis (Primary)  -     gabapentin (NEURONTIN) 300 MG capsule; Take 1 capsule by mouth at night for 7 days. It tolerating well, may increase to 1 capsule twice a day x 7 days with goal of taking medication three times per day  Dispense: 90 capsule; Refill: 0  -     Ambulatory Referral to Neurosurgery    2. Right lumbar radiculopathy  -     gabapentin (NEURONTIN) 300 MG capsule; Take 1 capsule by mouth at night for 7 days. It tolerating well, may increase to 1 capsule twice a day x 7 days with goal of taking medication three times per day  Dispense: 90 capsule; Refill: 0  -     Ambulatory Referral to Neurosurgery    3. Chronic bilateral low back pain, unspecified whether sciatica present    4. Degeneration of intervertebral disc of lumbar region with discogenic back pain and lower extremity pain      Zane Joshi reports a pain score of 5.  Given his pain assessment as noted, treatment options were discussed and the following options were decided upon as a follow-up plan to address the patient's pain: continuation  of current treatment plan for pain, prescription for non-opiod analgesics, and referral to specialist for assistance in pain treatment guidance.    --- Reviewed plan of care with Dr. Cade.  He notes that since patient has 2 diagnostic positive epidurals and has significant neuroforaminal stenosis on the right he recommends the patient be consulted by neurosurgery.  Referral will be placed at this time.  --- Trial Gabapentin.  The patient will be started on a trial of gabapentin. he will be started on gabapentin 300 mg once nightly for one week. Will then increase to twice daily for one week. Patient will then increase to three times daily as tolerated. Reviewed potential side effects, including somnolence and dizziness.    --- Brief discussion on a Bilateral L3-L5 MBB/RFA. May consider if axial back pain were to worsen.  --- Follow-up 1 month     JAYSON REPORT  As part of the patient's treatment plan, I am prescribing controlled substances. The patient has been made aware of appropriate use of such medications, including potential risk of somnolence, limited ability to drive and/or work safely, and the potential for dependence or overdose. It has also been made clear that these medications are for use by this patient only, without concomitant use of alcohol or other substances unless prescribed.     Patient has completed prescribing agreement detailing terms of continued prescribing of controlled substances, including monitoring JAYSON reports, urine drug screening, and pill counts if necessary. The patient is aware that inappropriate use will results in cessation of prescribing such medications.    As the clinician, I personally reviewed the JAYSON from 7/11/25 while the patient was in the office today.    History and physical exam exhibit continued safe and appropriate use of controlled substances.    Dictated utilizing Dragon dictation.

## 2025-08-26 ENCOUNTER — OFFICE VISIT (OUTPATIENT)
Dept: INTERNAL MEDICINE | Facility: CLINIC | Age: 65
End: 2025-08-26
Payer: COMMERCIAL

## 2025-08-26 ENCOUNTER — LAB (OUTPATIENT)
Dept: LAB | Facility: HOSPITAL | Age: 65
End: 2025-08-26
Payer: COMMERCIAL

## 2025-08-26 VITALS
TEMPERATURE: 98.4 F | RESPIRATION RATE: 16 BRPM | HEART RATE: 80 BPM | DIASTOLIC BLOOD PRESSURE: 78 MMHG | WEIGHT: 186 LBS | SYSTOLIC BLOOD PRESSURE: 142 MMHG | HEIGHT: 64 IN | BODY MASS INDEX: 31.76 KG/M2 | OXYGEN SATURATION: 97 %

## 2025-08-26 DIAGNOSIS — E11.65 TYPE 2 DIABETES MELLITUS WITH HYPERGLYCEMIA, WITHOUT LONG-TERM CURRENT USE OF INSULIN: ICD-10-CM

## 2025-08-26 DIAGNOSIS — Z12.5 SCREENING FOR PROSTATE CANCER: ICD-10-CM

## 2025-08-26 DIAGNOSIS — I10 ESSENTIAL HYPERTENSION: ICD-10-CM

## 2025-08-26 DIAGNOSIS — Z00.00 VISIT FOR WELL MAN HEALTH CHECK: Primary | ICD-10-CM

## 2025-08-26 DIAGNOSIS — Z00.00 HEALTHCARE MAINTENANCE: ICD-10-CM

## 2025-08-26 DIAGNOSIS — E78.5 HYPERLIPIDEMIA, UNSPECIFIED HYPERLIPIDEMIA TYPE: ICD-10-CM

## 2025-08-26 DIAGNOSIS — E55.9 VITAMIN D DEFICIENCY: ICD-10-CM

## 2025-08-26 LAB
25(OH)D3 SERPL-MCNC: 19.4 NG/ML (ref 30–100)
ALBUMIN SERPL-MCNC: 4 G/DL (ref 3.5–5.2)
ALBUMIN UR-MCNC: 1.6 MG/DL
ALBUMIN/GLOB SERPL: 1.3 G/DL
ALP SERPL-CCNC: 75 U/L (ref 39–117)
ALT SERPL W P-5'-P-CCNC: 28 U/L (ref 1–41)
ANION GAP SERPL CALCULATED.3IONS-SCNC: 10.7 MMOL/L (ref 5–15)
AST SERPL-CCNC: 26 U/L (ref 1–40)
BASOPHILS # BLD AUTO: 0.02 10*3/MM3 (ref 0–0.2)
BASOPHILS NFR BLD AUTO: 0.5 % (ref 0–1.5)
BILIRUB SERPL-MCNC: 0.7 MG/DL (ref 0–1.2)
BUN SERPL-MCNC: 15 MG/DL (ref 8–23)
BUN/CREAT SERPL: 16.5 (ref 7–25)
CALCIUM SPEC-SCNC: 9.2 MG/DL (ref 8.6–10.5)
CHLORIDE SERPL-SCNC: 106 MMOL/L (ref 98–107)
CHOLEST SERPL-MCNC: 147 MG/DL (ref 0–200)
CO2 SERPL-SCNC: 23.3 MMOL/L (ref 22–29)
CREAT SERPL-MCNC: 0.91 MG/DL (ref 0.76–1.27)
CREAT UR-MCNC: 85.6 MG/DL
DEPRECATED RDW RBC AUTO: 42.8 FL (ref 37–54)
EGFRCR SERPLBLD CKD-EPI 2021: 94.1 ML/MIN/1.73
EOSINOPHIL # BLD AUTO: 0.13 10*3/MM3 (ref 0–0.4)
EOSINOPHIL NFR BLD AUTO: 3.3 % (ref 0.3–6.2)
ERYTHROCYTE [DISTWIDTH] IN BLOOD BY AUTOMATED COUNT: 12.7 % (ref 12.3–15.4)
GLOBULIN UR ELPH-MCNC: 3 GM/DL
GLUCOSE SERPL-MCNC: 134 MG/DL (ref 65–99)
HBA1C MFR BLD: 6.8 % (ref 4.8–5.6)
HCT VFR BLD AUTO: 46.1 % (ref 37.5–51)
HDLC SERPL-MCNC: 36 MG/DL (ref 40–60)
HGB BLD-MCNC: 15.2 G/DL (ref 13–17.7)
IMM GRANULOCYTES # BLD AUTO: 0.02 10*3/MM3 (ref 0–0.05)
IMM GRANULOCYTES NFR BLD AUTO: 0.5 % (ref 0–0.5)
LDLC SERPL CALC-MCNC: 90 MG/DL (ref 0–100)
LDLC/HDLC SERPL: 2.46 {RATIO}
LYMPHOCYTES # BLD AUTO: 1.27 10*3/MM3 (ref 0.7–3.1)
LYMPHOCYTES NFR BLD AUTO: 32.4 % (ref 19.6–45.3)
MCH RBC QN AUTO: 30.5 PG (ref 26.6–33)
MCHC RBC AUTO-ENTMCNC: 33 G/DL (ref 31.5–35.7)
MCV RBC AUTO: 92.6 FL (ref 79–97)
MICROALBUMIN/CREAT UR: 18.7 MG/G (ref 0–29)
MONOCYTES # BLD AUTO: 0.46 10*3/MM3 (ref 0.1–0.9)
MONOCYTES NFR BLD AUTO: 11.7 % (ref 5–12)
NEUTROPHILS NFR BLD AUTO: 2.02 10*3/MM3 (ref 1.7–7)
NEUTROPHILS NFR BLD AUTO: 51.6 % (ref 42.7–76)
NRBC BLD AUTO-RTO: 0 /100 WBC (ref 0–0.2)
PLATELET # BLD AUTO: 142 10*3/MM3 (ref 140–450)
PMV BLD AUTO: 10.1 FL (ref 6–12)
POTASSIUM SERPL-SCNC: 4.3 MMOL/L (ref 3.5–5.2)
PROT SERPL-MCNC: 7 G/DL (ref 6–8.5)
PSA SERPL-MCNC: 0.6 NG/ML (ref 0–4)
RBC # BLD AUTO: 4.98 10*6/MM3 (ref 4.14–5.8)
SODIUM SERPL-SCNC: 140 MMOL/L (ref 136–145)
T-UPTAKE NFR SERPL: 1.04 TBI (ref 0.8–1.3)
T4 SERPL-MCNC: 6.51 MCG/DL (ref 4.5–11.7)
TRIGL SERPL-MCNC: 112 MG/DL (ref 0–150)
TSH SERPL DL<=0.05 MIU/L-ACNC: 1.91 UIU/ML (ref 0.27–4.2)
VLDLC SERPL-MCNC: 21 MG/DL (ref 5–40)
WBC NRBC COR # BLD AUTO: 3.92 10*3/MM3 (ref 3.4–10.8)

## 2025-08-26 PROCEDURE — 80061 LIPID PANEL: CPT | Performed by: FAMILY MEDICINE

## 2025-08-26 PROCEDURE — 84479 ASSAY OF THYROID (T3 OR T4): CPT | Performed by: FAMILY MEDICINE

## 2025-08-26 PROCEDURE — 84436 ASSAY OF TOTAL THYROXINE: CPT | Performed by: FAMILY MEDICINE

## 2025-08-26 PROCEDURE — 99214 OFFICE O/P EST MOD 30 MIN: CPT | Performed by: FAMILY MEDICINE

## 2025-08-26 PROCEDURE — 82306 VITAMIN D 25 HYDROXY: CPT | Performed by: FAMILY MEDICINE

## 2025-08-26 PROCEDURE — 80050 GENERAL HEALTH PANEL: CPT | Performed by: FAMILY MEDICINE

## 2025-08-26 PROCEDURE — G0103 PSA SCREENING: HCPCS | Performed by: FAMILY MEDICINE

## 2025-08-26 PROCEDURE — 82043 UR ALBUMIN QUANTITATIVE: CPT | Performed by: FAMILY MEDICINE

## 2025-08-26 PROCEDURE — 99396 PREV VISIT EST AGE 40-64: CPT | Performed by: FAMILY MEDICINE

## 2025-08-26 PROCEDURE — 83036 HEMOGLOBIN GLYCOSYLATED A1C: CPT | Performed by: FAMILY MEDICINE

## 2025-08-26 PROCEDURE — 36415 COLL VENOUS BLD VENIPUNCTURE: CPT | Performed by: FAMILY MEDICINE

## 2025-08-26 PROCEDURE — 82570 ASSAY OF URINE CREATININE: CPT | Performed by: FAMILY MEDICINE

## 2025-08-26 RX ORDER — AMLODIPINE BESYLATE 5 MG/1
5 TABLET ORAL DAILY
Qty: 90 TABLET | Refills: 3 | Status: SHIPPED | OUTPATIENT
Start: 2025-08-26

## 2025-08-26 RX ORDER — ATORVASTATIN CALCIUM 20 MG/1
20 TABLET, FILM COATED ORAL DAILY
Qty: 90 TABLET | Refills: 3 | Status: SHIPPED | OUTPATIENT
Start: 2025-08-26

## 2025-08-26 RX ORDER — LOSARTAN POTASSIUM 50 MG/1
50 TABLET ORAL DAILY
Qty: 90 TABLET | Refills: 3 | Status: SHIPPED | OUTPATIENT
Start: 2025-08-26

## 2025-08-26 RX ORDER — ICOSAPENT ETHYL 0.5 G/1
4 CAPSULE ORAL 2 TIMES DAILY
Qty: 720 CAPSULE | Refills: 3 | Status: SHIPPED | OUTPATIENT
Start: 2025-08-26

## 2025-08-26 RX ORDER — METFORMIN HYDROCHLORIDE 500 MG/1
1000 TABLET, EXTENDED RELEASE ORAL 2 TIMES DAILY
Qty: 360 TABLET | Refills: 3 | Status: SHIPPED | OUTPATIENT
Start: 2025-08-26

## (undated) DEVICE — ELECTRD BLD EZ CLN MOD XLNG 2.75IN

## (undated) DEVICE — TROC SYS CANN HERN EZ PRT

## (undated) DEVICE — SUT VIC 0 TN 27IN DYED JTN0G

## (undated) DEVICE — ENDOPATH XCEL BLADELESS TROCARS WITH STABILITY SLEEVES: Brand: ENDOPATH XCEL

## (undated) DEVICE — OSC GEN LAPAROSCOPY: Brand: MEDLINE INDUSTRIES, INC.

## (undated) DEVICE — CATH DIAG IMPULSE FR4 5F 100CM

## (undated) DEVICE — SUT VIC 3/0 SH 27IN J416H

## (undated) DEVICE — PK PROC MINOR TOWER 40

## (undated) DEVICE — SUT VIC 5/0 PS2 18IN J495H

## (undated) DEVICE — GLV SURG PREMIERPRO ORTHO LTX PF SZ7.5 BRN

## (undated) DEVICE — SKIN PREP TRAY W/CHG: Brand: MEDLINE INDUSTRIES, INC.

## (undated) DEVICE — GLIDESHEATH SLENDER STAINLESS STEEL KIT: Brand: GLIDESHEATH SLENDER

## (undated) DEVICE — ENDOCUT SCISSOR TIP, DISPOSABLE: Brand: RENEW

## (undated) DEVICE — STRUCTURAL BALLOON TROCAR: Brand: AUTO SUTURE

## (undated) DEVICE — ENDOPATH XCEL UNIVERSAL TROCAR STABLILITY SLEEVES: Brand: ENDOPATH XCEL

## (undated) DEVICE — SUT ETHIB 0/0 MO6 I8IN CX45D

## (undated) DEVICE — SUT VIC 3/0 TIES 18IN J110T

## (undated) DEVICE — PK CATH CARD 40

## (undated) DEVICE — OVAL SHAPE BALLOON: Brand: EXTRA VIEW

## (undated) DEVICE — TBG PENCL TELESCP MEGADYNE SMOKE EVAC 10FT

## (undated) DEVICE — CATH VENT MIV RADL PIG ST TIP 5F 110CM

## (undated) DEVICE — GW EMR FIX EXCHG J STD .035 3MM 260CM

## (undated) DEVICE — CATH DIAG IMPULSE FL3.5 5F 100CM

## (undated) DEVICE — TRAP FLD MINIVAC MEGADYNE 100ML

## (undated) DEVICE — THE EASYGRIP FLO-41 PRECISION MIS DELIVERY SYSTEM (EASYGRIP FLO-41 SYSTEM) IS A STERILE, SINGLE-USE DEVICE THAT CONSISTS OF TWO COMPONENTS: (1) ONE APPLICATOR DEVICE WITH A 41 CM LONG CANNULA (5 MM OUTER DIAMETER) AND (2) ONE EMPTY 1.5 ML SYRINGE.: Brand: EASYGRIP FLO-41

## (undated) DEVICE — KT MANIFLD CARDIAC

## (undated) DEVICE — ADHS SKIN SURG TISS VISC PREMIERPRO EXOFIN HI/VISC FAST/DRY

## (undated) DEVICE — VISUALIZATION SYSTEM: Brand: CLEARIFY